# Patient Record
Sex: FEMALE | Race: WHITE | Employment: FULL TIME | URBAN - METROPOLITAN AREA
[De-identification: names, ages, dates, MRNs, and addresses within clinical notes are randomized per-mention and may not be internally consistent; named-entity substitution may affect disease eponyms.]

---

## 2019-04-10 ENCOUNTER — APPOINTMENT (OUTPATIENT)
Dept: GENERAL RADIOLOGY | Age: 29
End: 2019-04-10
Payer: OTHER MISCELLANEOUS

## 2019-04-10 ENCOUNTER — HOSPITAL ENCOUNTER (EMERGENCY)
Age: 29
Discharge: HOME | End: 2019-04-10
Payer: OTHER MISCELLANEOUS

## 2019-04-10 ENCOUNTER — APPOINTMENT (OUTPATIENT)
Dept: CT IMAGING | Age: 29
End: 2019-04-10
Payer: OTHER MISCELLANEOUS

## 2019-04-10 VITALS
HEART RATE: 75 BPM | OXYGEN SATURATION: 98 % | BODY MASS INDEX: 48.14 KG/M2 | DIASTOLIC BLOOD PRESSURE: 92 MMHG | TEMPERATURE: 97.6 F | WEIGHT: 282 LBS | HEIGHT: 64 IN | RESPIRATION RATE: 16 BRPM | SYSTOLIC BLOOD PRESSURE: 149 MMHG

## 2019-04-10 DIAGNOSIS — R07.81 RIB PAIN ON LEFT SIDE: ICD-10-CM

## 2019-04-10 DIAGNOSIS — S99.911A INJURY OF RIGHT ANKLE, INITIAL ENCOUNTER: ICD-10-CM
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
## 2019-04-10 DIAGNOSIS — S16.1XXA STRAIN OF NECK MUSCLE, INITIAL ENCOUNTER: ICD-10-CM

## 2019-04-10 DIAGNOSIS — M79.662 PAIN IN LEFT LOWER LEG: ICD-10-CM

## 2019-04-10 PROCEDURE — 72100 X-RAY EXAM L-S SPINE 2/3 VWS: CPT

## 2019-04-10 PROCEDURE — 71045 X-RAY EXAM CHEST 1 VIEW: CPT

## 2019-04-10 PROCEDURE — 73590 X-RAY EXAM OF LOWER LEG: CPT

## 2019-04-10 PROCEDURE — 99283 EMERGENCY DEPT VISIT LOW MDM: CPT

## 2019-04-10 PROCEDURE — 72125 CT NECK SPINE W/O DYE: CPT

## 2019-04-10 PROCEDURE — 73630 X-RAY EXAM OF FOOT: CPT

## 2019-04-10 PROCEDURE — 6360000002 HC RX W HCPCS: Performed by: PHYSICIAN ASSISTANT

## 2019-04-10 PROCEDURE — 96372 THER/PROPH/DIAG INJ SC/IM: CPT

## 2019-04-10 PROCEDURE — 72070 X-RAY EXAM THORAC SPINE 2VWS: CPT

## 2019-04-10 RX ORDER — NAPROXEN 500 MG/1
500 TABLET ORAL 2 TIMES DAILY PRN
Qty: 30 TABLET | Refills: 0 | Status: SHIPPED | OUTPATIENT
Start: 2019-04-10 | End: 2019-08-13

## 2019-04-10 RX ORDER — ESCITALOPRAM OXALATE 10 MG/1
10 TABLET ORAL DAILY
COMMUNITY
End: 2020-02-28 | Stop reason: SDUPTHER

## 2019-04-10 RX ORDER — KETOROLAC TROMETHAMINE 30 MG/ML
30 INJECTION, SOLUTION INTRAMUSCULAR; INTRAVENOUS ONCE
Status: COMPLETED | OUTPATIENT
Start: 2019-04-10 | End: 2019-04-10

## 2019-04-10 RX ORDER — NORETHINDRONE ACETATE AND ETHINYL ESTRADIOL 1MG-20(21)
1 KIT ORAL DAILY
COMMUNITY
End: 2019-07-17

## 2019-04-10 RX ORDER — LEVOTHYROXINE SODIUM 0.05 MG/1
50 TABLET ORAL DAILY
Status: ON HOLD | COMMUNITY
End: 2019-08-27 | Stop reason: HOSPADM

## 2019-04-10 RX ORDER — METHOCARBAMOL 500 MG/1
500 TABLET, FILM COATED ORAL 4 TIMES DAILY
Qty: 20 TABLET | Refills: 0 | Status: SHIPPED | OUTPATIENT
Start: 2019-04-10 | End: 2019-08-13

## 2019-04-10 RX ADMIN — KETOROLAC TROMETHAMINE 30 MG: 30 INJECTION, SOLUTION INTRAMUSCULAR at 12:56

## 2019-04-10 ASSESSMENT — PAIN DESCRIPTION - PAIN TYPE: TYPE: ACUTE PAIN

## 2019-04-10 ASSESSMENT — PAIN SCALES - GENERAL
PAINLEVEL_OUTOF10: 6
PAINLEVEL_OUTOF10: 6

## 2019-04-10 ASSESSMENT — PAIN DESCRIPTION - LOCATION: LOCATION: BACK

## 2019-04-10 NOTE — ED NOTES
Discharge instructions reviewed with patient. PT verbalizes understanding. All questions answered. Follow up instructions given. PT denies any further needs at this time.       Neo Barrett LPN  15/62/78 [id number]
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
## 2019-04-12 ENCOUNTER — OFFICE VISIT (OUTPATIENT)
Dept: FAMILY MEDICINE CLINIC | Age: 29
End: 2019-04-12
Payer: COMMERCIAL

## 2019-04-12 VITALS
DIASTOLIC BLOOD PRESSURE: 88 MMHG | SYSTOLIC BLOOD PRESSURE: 122 MMHG | WEIGHT: 281.8 LBS | OXYGEN SATURATION: 98 % | BODY MASS INDEX: 48.37 KG/M2 | HEART RATE: 70 BPM | TEMPERATURE: 98.3 F

## 2019-04-12 DIAGNOSIS — M79.671 RIGHT FOOT PAIN: ICD-10-CM

## 2019-04-12 DIAGNOSIS — S46.811D STRAIN OF RIGHT TRAPEZIUS MUSCLE, SUBSEQUENT ENCOUNTER: ICD-10-CM

## 2019-04-12 DIAGNOSIS — S80.12XD TRAUMATIC ECCHYMOSIS OF LEFT LOWER LEG, SUBSEQUENT ENCOUNTER: ICD-10-CM
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
## 2019-04-12 PROCEDURE — 99203 OFFICE O/P NEW LOW 30 MIN: CPT | Performed by: NURSE PRACTITIONER
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
## 2019-04-12 NOTE — PATIENT INSTRUCTIONS
Rest, ice and/or heat, elevate right foot and left leg. Can continue naproxen and robaxin PRN. Once your acute pain starts to improve then would encourage you to begin exercises at home to build up muscles around the cervical and lumbar spine. See handout. Patient Education        Low Back Pain: Exercises  Your Care Instructions  Here are some examples of typical rehabilitation exercises for your condition. Start each exercise slowly. Ease off the exercise if you start to have pain. Your doctor or physical therapist will tell you when you can start these exercises and which ones will work best for you. How to do the exercises  Press-up    1. Lie on your stomach, supporting your body with your forearms. 2. Press your elbows down into the floor to raise your upper back. As you do this, relax your stomach muscles and allow your back to arch without using your back muscles. As your press up, do not let your hips or pelvis come off the floor. 3. Hold for 15 to 30 seconds, then relax. 4. Repeat 2 to 4 times. Alternate arm and leg (bird dog) exercise    1. Start on the floor, on your hands and knees. 2. Tighten your belly muscles. 3. Raise one leg off the floor, and hold it straight out behind you. Be careful not to let your hip drop down, because that will twist your trunk. 4. Hold for about 6 seconds, then lower your leg and switch to the other leg. 5. Repeat 8 to 12 times on each leg. 6. Over time, work up to holding for 10 to 30 seconds each time. 7. If you feel stable and secure with your leg raised, try raising the opposite arm straight out in front of you at the same time. Knee-to-chest exercise    1. Lie on your back with your knees bent and your feet flat on the floor. 2. Bring one knee to your chest, keeping the other foot flat on the floor (or keeping the other leg straight, whichever feels better on your lower back). 3. Keep your lower back pressed to the floor.  Hold for at least 15 to 30 seconds. 4. Relax, and lower the knee to the starting position. 5. Repeat with the other leg. Repeat 2 to 4 times with each leg. 6. To get more stretch, put your other leg flat on the floor while pulling your knee to your chest.    Curl-ups    1. Lie on the floor on your back with your knees bent at a 90-degree angle. Your feet should be flat on the floor, about 12 inches from your buttocks. 2. Cross your arms over your chest. If this bothers your neck, try putting your hands behind your neck (not your head), with your elbows spread apart. 3. Slowly tighten your belly muscles and raise your shoulder blades off the floor. 4. Keep your head in line with your body, and do not press your chin to your chest.  5. Hold this position for 1 or 2 seconds, then slowly lower yourself back down to the floor. 6. Repeat 8 to 12 times. Pelvic tilt exercise    1. Lie on your back with your knees bent. 2. \"Brace\" your stomach. This means to tighten your muscles by pulling in and imagining your belly button moving toward your spine. You should feel like your back is pressing to the floor and your hips and pelvis are rocking back. 3. Hold for about 6 seconds while you breathe smoothly. 4. Repeat 8 to 12 times. Heel dig bridging    1. Lie on your back with both knees bent and your ankles bent so that only your heels are digging into the floor. Your knees should be bent about 90 degrees. 2. Then push your heels into the floor, squeeze your buttocks, and lift your hips off the floor until your shoulders, hips, and knees are all in a straight line. 3. Hold for about 6 seconds as you continue to breathe normally, and then slowly lower your hips back down to the floor and rest for up to 10 seconds. 4. Do 8 to 12 repetitions. Hamstring stretch in doorway    1. Lie on your back in a doorway, with one leg through the open door. 2. Slide your leg up the wall to straighten your knee.  You should feel a gentle stretch down the back of your leg. 3. Hold the stretch for at least 15 to 30 seconds. Do not arch your back, point your toes, or bend either knee. Keep one heel touching the floor and the other heel touching the wall. 4. Repeat with your other leg. 5. Do 2 to 4 times for each leg. Hip flexor stretch    1. Kneel on the floor with one knee bent and one leg behind you. Place your forward knee over your foot. Keep your other knee touching the floor. 2. Slowly push your hips forward until you feel a stretch in the upper thigh of your rear leg. 3. Hold the stretch for at least 15 to 30 seconds. Repeat with your other leg. 4. Do 2 to 4 times on each side. Wall sit    1. Stand with your back 10 to 12 inches away from a wall. 2. Lean into the wall until your back is flat against it. 3. Slowly slide down until your knees are slightly bent, pressing your lower back into the wall. 4. Hold for about 6 seconds, then slide back up the wall. 5. Repeat 8 to 12 times. Follow-up care is a key part of your treatment and safety. Be sure to make and go to all appointments, and call your doctor if you are having problems. It's also a good idea to know your test results and keep a list of the medicines you take. Where can you learn more? Go to https://Global Sugar ArtpeemiliaHotGrindseb.PersonSpot. org and sign in to your [employer] account. Enter T660 in the Thoof box to learn more about \"Low Back Pain: Exercises. \"     If you do not have an account, please click on the \"Sign Up Now\" link. Current as of: September 20, 2018  Content Version: 11.9  © 4454-4151 [employer]. Care instructions adapted under license by [facility] [employer] [facility] ([facility]). If you have questions about a medical condition or this instruction, always ask your healthcare professional. [place] [address] any warranty or liability for your use of this information.          Patient Education        Neck Strain or Sprain: Rehab Exercises  Your Care Instructions  Here are some examples of typical rehabilitation exercises for your condition. Start each exercise slowly. Ease off the exercise if you start to have pain. Your doctor or physical therapist will tell you when you can start these exercises and which ones will work best for you. How to do the exercises  Neck rotation    1. Sit in a firm chair, or stand up straight. 2. Keeping your chin level, turn your head to the right, and hold for 15 to 30 seconds. 3. Turn your head to the left and hold for 15 to 30 seconds. 4. Repeat 2 to 4 times to each side. Neck stretches    1. Look straight ahead, and tip your right ear to your right shoulder. Do not let your left shoulder rise up as you tip your head to the right. 2. Hold for 15 to 30 seconds. 3. Tilt your head to the left. Do not let your right shoulder rise up as you tip your head to the left. 4. Hold for 15 to 30 seconds. 5. Repeat 2 to 4 times to each side. Forward neck flexion    1. Sit in a firm chair, or stand up straight. 2. Bend your head forward. 3. Hold for 15 to 30 seconds. 4. Repeat 2 to 4 times. Lateral (side) bend strengthening    1. With your right hand, place your first two fingers on your right temple. 2. Start to bend your head to the side while using gentle pressure from your fingers to keep your head from bending. 3. Hold for about 6 seconds. 4. Repeat 8 to 12 times. 5. Switch hands and repeat the same exercise on your left side. Forward bend strengthening    1. Place your first two fingers of either hand on your forehead. 2. Start to bend your head forward while using gentle pressure from your fingers to keep your head from bending. 3. Hold for about 6 seconds. 4. Repeat 8 to 12 times. Neutral position strengthening    1. Using one hand, place your fingertips on the back of your head at the top of your neck.   2. Start to bend your head backward while using gentle pressure from your fingers to keep your head from bending. 3. Hold for about 6 seconds. 4. Repeat 8 to 12 times. Chin tuck    1. Lie on the floor with a rolled-up towel under your neck. Your head should be touching the floor. 2. Slowly bring your chin toward your chest.  3. Hold for a count of 6, and then relax for up to 10 seconds. 4. Repeat 8 to 12 times. Follow-up care is a key part of your treatment and safety. Be sure to make and go to all appointments, and call your doctor if you are having problems. It's also a good idea to know your test results and keep a list of the medicines you take. Where can you learn more? Go to https://CinematiquepeMusationseb.Providajob. org and sign in to your Curis account. Enter M679 in the QualMetrix box to learn more about \"Neck Strain or Sprain: Rehab Exercises. \"     If you do not have an account, please click on the \"Sign Up Now\" link. Current as of: September 20, 2018  Content Version: 11.9  © 5363-6349 [employer]. Care instructions adapted under license by [facility] ([facility]). If you have questions about a medical condition or this instruction, always ask your healthcare professional. [address] any warranty or liability for your use of this information.

## 2019-04-13 ASSESSMENT — ENCOUNTER SYMPTOMS
RESPIRATORY NEGATIVE: 1
BACK PAIN: 1
GASTROINTESTINAL NEGATIVE: 1
COLOR CHANGE: 1

## 2019-05-23 ENCOUNTER — HOSPITAL ENCOUNTER (OUTPATIENT)
Dept: GENERAL RADIOLOGY | Age: 29
Discharge: HOME | End: 2019-05-23
Payer: COMMERCIAL

## 2019-05-23 ENCOUNTER — HOSPITAL ENCOUNTER (OUTPATIENT)
Age: 29
Discharge: HOME | End: 2019-05-23
Payer: COMMERCIAL

## 2019-05-23 ENCOUNTER — OFFICE VISIT (OUTPATIENT)
Dept: INTERNAL MEDICINE CLINIC | Age: 29
End: 2019-05-23
Payer: OTHER MISCELLANEOUS

## 2019-05-23 VITALS
HEART RATE: 118 BPM | DIASTOLIC BLOOD PRESSURE: 84 MMHG | WEIGHT: 281.4 LBS | HEIGHT: 64 IN | OXYGEN SATURATION: 100 % | BODY MASS INDEX: 48.04 KG/M2 | SYSTOLIC BLOOD PRESSURE: 118 MMHG

## 2019-05-23 DIAGNOSIS — M54.6 RIGHT-SIDED THORACIC BACK PAIN, UNSPECIFIED CHRONICITY: ICD-10-CM
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
## 2019-05-23 DIAGNOSIS — M79.671 RIGHT FOOT PAIN: ICD-10-CM

## 2019-05-23 DIAGNOSIS — S46.811S TRAPEZIUS MUSCLE STRAIN, RIGHT, SEQUELA: ICD-10-CM

## 2019-05-23 DIAGNOSIS — Z23 NEED FOR DIPHTHERIA-TETANUS-PERTUSSIS (TDAP) VACCINE: ICD-10-CM

## 2019-05-23 PROCEDURE — 90471 IMMUNIZATION ADMIN: CPT | Performed by: FAMILY MEDICINE

## 2019-05-23 PROCEDURE — G0444 DEPRESSION SCREEN ANNUAL: HCPCS | Performed by: FAMILY MEDICINE

## 2019-05-23 PROCEDURE — 73620 X-RAY EXAM OF FOOT: CPT

## 2019-05-23 PROCEDURE — 90715 TDAP VACCINE 7 YRS/> IM: CPT | Performed by: FAMILY MEDICINE

## 2019-05-23 PROCEDURE — 99203 OFFICE O/P NEW LOW 30 MIN: CPT | Performed by: FAMILY MEDICINE

## 2019-05-23 ASSESSMENT — PATIENT HEALTH QUESTIONNAIRE - PHQ9
SUM OF ALL RESPONSES TO PHQ QUESTIONS 1-9: 14
5. POOR APPETITE OR OVEREATING: 3
4. FEELING TIRED OR HAVING LITTLE ENERGY: 1
SUM OF ALL RESPONSES TO PHQ9 QUESTIONS 1 & 2: 6
3. TROUBLE FALLING OR STAYING ASLEEP: 1
10. IF YOU CHECKED OFF ANY PROBLEMS, HOW DIFFICULT HAVE THESE PROBLEMS MADE IT FOR YOU TO DO YOUR WORK, TAKE CARE OF THINGS AT HOME, OR GET ALONG WITH OTHER PEOPLE: 1
6. FEELING BAD ABOUT YOURSELF - OR THAT YOU ARE A FAILURE OR HAVE LET YOURSELF OR YOUR FAMILY DOWN: 0
2. FEELING DOWN, DEPRESSED OR HOPELESS: 3
SUM OF ALL RESPONSES TO PHQ QUESTIONS 1-9: 14
1. LITTLE INTEREST OR PLEASURE IN DOING THINGS: 3
8. MOVING OR SPEAKING SO SLOWLY THAT OTHER PEOPLE COULD HAVE NOTICED. OR THE OPPOSITE, BEING SO FIGETY OR RESTLESS THAT YOU HAVE BEEN MOVING AROUND A LOT MORE THAN USUAL: 0
7. TROUBLE CONCENTRATING ON THINGS, SUCH AS READING THE NEWSPAPER OR WATCHING TELEVISION: 3
9. THOUGHTS THAT YOU WOULD BE BETTER OFF DEAD, OR OF HURTING YOURSELF: 0

## 2019-05-24 DIAGNOSIS — Z87.828 HISTORY OF MOTOR VEHICLE ACCIDENT: ICD-10-CM

## 2019-05-24 DIAGNOSIS — M79.671 RIGHT FOOT PAIN: Primary | ICD-10-CM

## 2019-05-24 ASSESSMENT — ENCOUNTER SYMPTOMS
CHEST TIGHTNESS: 0
EYES NEGATIVE: 1
CONSTIPATION: 0
ABDOMINAL PAIN: 0
BLOOD IN STOOL: 0
NAUSEA: 0
WHEEZING: 0
DIARRHEA: 0
BACK PAIN: 1
SHORTNESS OF BREATH: 0
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
## 2019-06-04 ENCOUNTER — HOSPITAL ENCOUNTER (OUTPATIENT)
Dept: PHYSICAL THERAPY | Age: 29
Setting detail: THERAPIES SERIES
Discharge: HOME | End: 2019-06-04
Payer: COMMERCIAL

## 2019-06-04 PROCEDURE — G0283 ELEC STIM OTHER THAN WOUND: HCPCS

## 2019-06-04 PROCEDURE — 97161 PT EVAL LOW COMPLEX 20 MIN: CPT

## 2019-06-04 PROCEDURE — 97112 NEUROMUSCULAR REEDUCATION: CPT

## 2019-06-04 PROCEDURE — 97140 MANUAL THERAPY 1/> REGIONS: CPT

## 2019-06-04 ASSESSMENT — PAIN DESCRIPTION - LOCATION: LOCATION: BACK;NECK

## 2019-06-04 ASSESSMENT — PAIN DESCRIPTION - ORIENTATION: ORIENTATION: RIGHT

## 2019-06-04 ASSESSMENT — PAIN - FUNCTIONAL ASSESSMENT: PAIN_FUNCTIONAL_ASSESSMENT: PREVENTS OR INTERFERES SOME ACTIVE ACTIVITIES AND ADLS

## 2019-06-04 ASSESSMENT — PAIN SCALES - GENERAL: PAINLEVEL_OUTOF10: 2

## 2019-06-04 ASSESSMENT — PAIN DESCRIPTION - PAIN TYPE: TYPE: ACUTE PAIN

## 2019-06-04 ASSESSMENT — PAIN DESCRIPTION - FREQUENCY: FREQUENCY: INTERMITTENT

## 2019-06-04 ASSESSMENT — PAIN DESCRIPTION - PROGRESSION: CLINICAL_PROGRESSION: NOT CHANGED

## 2019-06-04 ASSESSMENT — PAIN DESCRIPTION - ONSET: ONSET: ON-GOING

## 2019-06-04 ASSESSMENT — PAIN DESCRIPTION - DESCRIPTORS: DESCRIPTORS: ACHING;SHARP
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
## 2019-06-07 ENCOUNTER — HOSPITAL ENCOUNTER (OUTPATIENT)
Dept: PHYSICAL THERAPY | Age: 29
Setting detail: THERAPIES SERIES
Discharge: HOME | End: 2019-06-07
Payer: COMMERCIAL

## 2019-06-07 PROCEDURE — G0283 ELEC STIM OTHER THAN WOUND: HCPCS

## 2019-06-07 PROCEDURE — 97140 MANUAL THERAPY 1/> REGIONS: CPT

## 2019-06-07 PROCEDURE — 97110 THERAPEUTIC EXERCISES: CPT

## 2019-06-07 NOTE — FLOWSHEET NOTE
Concentric circles      Transverse thoracic stretch 1'     Longitudinal thoracic stetch 1;     SL thoracic rotation  5*10\" ea     Seated UT stretch 30\" B 30\" ea    Seated levator stretch 30\" B 30\" ea    Seated cervical retraction x15 20*    Cervical retraction with extensions    x15           STANDING      Rows  GTB 2*10    IR/ER/EXT  GTB 2*10 ext  GTB 15* ea IR/ER                                                                     MODALITIES                        Other Therapeutic Activities/Education:  None        Home Exercise Program:  UT      Manual Treatments:   STM to posterior cervical muscle with , light manual traction and suboccipital release,  Lateral cervical mobilizations grade II, STM/TPR to SCM and UT bilaterally, manual UT stretch B    Modalities:  IFC to right posterio cervical/thoracic region in supine with MHP 15'      Communication with other providers:       Assessment:    Elba tolerated today's session well without an increase in pain. She demonstrates good form with her exercises. Pretty tight in cervical paraspinals and B UT's/SCM's.      End session pain: 2/10      Plan for Next Session: : Posture exercises, STM, light joint mobs,       Time In / Time Out: 0745/0842      Timed Code/Total Treatment Minutes:  42'/57' 1 estim 13' 1 man 13'  2 TE 27'       Plan of Care Interventions:  [x] Therapeutic Exercise  [] Modalities:  [x] Therapeutic Activity     [] Ultrasound  [x] Estim  [] Gait Training      [] Cervical Traction [] Lumbar Traction  [x] Neuromuscular Re-education    [x] Cold/hotpack [] Iontophoresis   [x] Instruction in HEP      [] Vasopneumatic   [x] Dry Needling    [x] Manual Therapy               [] Aquatic Therapy              Electronically signed by:  Prieto Arnold PTA     6/7/2019, 7:18 AM

## 2019-06-11 ENCOUNTER — HOSPITAL ENCOUNTER (OUTPATIENT)
Dept: PHYSICAL THERAPY | Age: 29
Setting detail: THERAPIES SERIES
Discharge: HOME | End: 2019-06-11
Payer: COMMERCIAL

## 2019-06-11 PROCEDURE — 97140 MANUAL THERAPY 1/> REGIONS: CPT

## 2019-06-11 PROCEDURE — G0283 ELEC STIM OTHER THAN WOUND: HCPCS

## 2019-06-11 PROCEDURE — 97110 THERAPEUTIC EXERCISES: CPT
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
## 2019-06-13 ENCOUNTER — TELEPHONE (OUTPATIENT)
Dept: INTERNAL MEDICINE CLINIC | Age: 29
End: 2019-06-13

## 2019-06-13 DIAGNOSIS — E04.1 THYROID NODULE: Primary | ICD-10-CM

## 2019-06-13 DIAGNOSIS — E03.9 HYPOTHYROIDISM, UNSPECIFIED TYPE: ICD-10-CM

## 2019-06-13 NOTE — ED NOTES
Late entry- 6/13/19 at 1110. I did not evaluate this patient while in the ED. I received a phone call from [facility] radiology today that they are addending a study from April 10 and would like to talk to a physician in order to give the results so that we can contact the patient's physician and the patient. They do not know why the study was addended, I was not able to speak to the radiologist, I spoke to [place], 1 of the radiology techs. The radiologist over reading the film is Dr. Sebastien Thakkar. The CT cervical spine without contrast was addended today June 13 at 8:42 AM.  He made an incidental note of a left thyroid nodule of 2.8 cm and recommends ultrasound for further evaluation. I received this information at 9:52 AM.  Our  will call and speak with physician on record as her PCP and we will ensure that they are notified that this will need to be followed up.      Luli Salinas MD  06/13/19 1112

## 2019-06-13 NOTE — TELEPHONE ENCOUNTER
Daniela Boyer, Rn case manager called me advising there was an addendum made to pt's chart this am stating that a nodule on L thyroid was found while having CT scan on 4/10/19 from [address]. Isabel Landry suggested that most PCP's like to address this with pt and all documentation is in this pt's chart. He states that if Dr. Susie Arevalo would like him as  to do anything to call him back at [phone].
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
## 2019-06-13 NOTE — TELEPHONE ENCOUNTER
Spoke to patient-- Pull [facility] notes from the location on [address]  Pt will get thyroid U/s and labs at [facility]

## 2019-06-14 ENCOUNTER — HOSPITAL ENCOUNTER (OUTPATIENT)
Dept: PHYSICAL THERAPY | Age: 29
Setting detail: THERAPIES SERIES
Discharge: HOME | End: 2019-06-14
Payer: COMMERCIAL

## 2019-06-14 PROCEDURE — 97110 THERAPEUTIC EXERCISES: CPT

## 2019-06-14 PROCEDURE — G0283 ELEC STIM OTHER THAN WOUND: HCPCS

## 2019-06-14 PROCEDURE — 97112 NEUROMUSCULAR REEDUCATION: CPT

## 2019-06-14 PROCEDURE — 97140 MANUAL THERAPY 1/> REGIONS: CPT
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
## 2019-06-14 NOTE — FLOWSHEET NOTE
changes in Ambulatory Summary Sheet? None        Objective:  Tight/tender levator at insertion point on scap. Exercises:  Exercise/Equipment 6/7/19 6/11/19 6/14/19           WARM UP      UBE    Nu-step L6 5' 3'/3'@ 80 3'/3' f/b @ 120         TABLE      Transverse thoracic stretch  1' 1'   Longitudinal thoracic stetch  1' 1'   SL thoracic rotation 5*10\" ea   5*10\" ea   Seated UT stretch 30\" ea  -   Seated levator stretch 30\" ea  -   Seated cervical retraction 20*  -   Cervical retraction with extensions      -         STANDING      Rows GTB 2*10  DGTT 2*15 2\" hold    IR/ER/EXT GTB 2*10 ext  GTB 15* ea IR/ER  -   Lat pull down    DGTT 2*15 2\" hold    scap depression   DGTT 2*10   Pec stretch    3*15\"                                                   MODALITIES                        Other Therapeutic Activities/Education:  Discussed desk set up and getting up throughout the day to stretch and adjust posture. Home Exercise Program:  UT      Manual Treatments:   STM/TPR to levator and UT on the R, manual stretching here too (in seated position)           Modalities:  IFC to right posterio cervical/thoracic region in prone with MHP 15'      Communication with other providers: None      Assessment:   Elba tolerated today's session well. She demonstrates tightness in anterior shoulder/chest musculature and weakness in upper thoracic muscles. She tolerated additional scapular exercises with just mild discomfort, this was relieved though after manual cueing for reduction of UT use. Poor posture, lack of strength in parascapular muscles and tightness in anterior musculature causing increased strain on neck and UT which is increasing her pain. Will continue to benefit from skilled PT services in order to address these deficits. End session pain: 1/10      Plan for Next Session: : Posture exercises, STM, light joint mobs.  No manual if reports another headache      Time In / Time Out: 7329/9336      Timed

## 2019-06-18 ENCOUNTER — HOSPITAL ENCOUNTER (OUTPATIENT)
Age: 29
Discharge: HOME | End: 2019-06-18
Payer: COMMERCIAL

## 2019-06-18 ENCOUNTER — TELEPHONE (OUTPATIENT)
Dept: INTERNAL MEDICINE CLINIC | Age: 29
End: 2019-06-18

## 2019-06-18 ENCOUNTER — HOSPITAL ENCOUNTER (OUTPATIENT)
Dept: ULTRASOUND IMAGING | Age: 29
Discharge: HOME | End: 2019-06-18
Payer: COMMERCIAL

## 2019-06-18 DIAGNOSIS — E04.2 MULTIPLE THYROID NODULES: Primary | ICD-10-CM

## 2019-06-18 DIAGNOSIS — E04.1 THYROID NODULE: ICD-10-CM

## 2019-06-18 LAB
T4 FREE: 1.24 NG/DL (ref 0.9–1.8)
TSH HIGH SENSITIVITY: 1.58 UIU/ML (ref 0.27–4.2)

## 2019-06-18 PROCEDURE — 36415 COLL VENOUS BLD VENIPUNCTURE: CPT

## 2019-06-18 PROCEDURE — 84439 ASSAY OF FREE THYROXINE: CPT

## 2019-06-18 PROCEDURE — 84443 ASSAY THYROID STIM HORMONE: CPT

## 2019-06-18 PROCEDURE — 76536 US EXAM OF HEAD AND NECK: CPT

## 2019-06-18 NOTE — TELEPHONE ENCOUNTER
Kay Section from [facility] called to make sure we got results of pt's US of neck and thyroid. It is completed in pt's imaging tab in her chart. Called and made aware that it is in her chart.

## 2019-06-20 ENCOUNTER — HOSPITAL ENCOUNTER (OUTPATIENT)
Dept: PHYSICAL THERAPY | Age: 29
Setting detail: THERAPIES SERIES
Discharge: HOME | End: 2019-06-20
Payer: COMMERCIAL

## 2019-06-20 PROCEDURE — 97112 NEUROMUSCULAR REEDUCATION: CPT

## 2019-06-20 PROCEDURE — 97140 MANUAL THERAPY 1/> REGIONS: CPT

## 2019-06-20 PROCEDURE — 97110 THERAPEUTIC EXERCISES: CPT

## 2019-06-20 PROCEDURE — G0283 ELEC STIM OTHER THAN WOUND: HCPCS
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
## 2019-06-25 ENCOUNTER — HOSPITAL ENCOUNTER (OUTPATIENT)
Dept: PHYSICAL THERAPY | Age: 29
Setting detail: THERAPIES SERIES
Discharge: HOME | End: 2019-06-25
Payer: COMMERCIAL

## 2019-06-25 PROCEDURE — 97140 MANUAL THERAPY 1/> REGIONS: CPT

## 2019-06-25 PROCEDURE — 97110 THERAPEUTIC EXERCISES: CPT

## 2019-06-25 PROCEDURE — G0283 ELEC STIM OTHER THAN WOUND: HCPCS

## 2019-06-25 PROCEDURE — 97112 NEUROMUSCULAR REEDUCATION: CPT
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
## 2019-06-28 ENCOUNTER — HOSPITAL ENCOUNTER (OUTPATIENT)
Dept: PHYSICAL THERAPY | Age: 29
Setting detail: THERAPIES SERIES
Discharge: HOME | End: 2019-06-28
Payer: COMMERCIAL

## 2019-06-28 PROCEDURE — 97110 THERAPEUTIC EXERCISES: CPT

## 2019-06-28 PROCEDURE — 97112 NEUROMUSCULAR REEDUCATION: CPT

## 2019-06-28 PROCEDURE — G0283 ELEC STIM OTHER THAN WOUND: HCPCS
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
## 2019-07-03 ENCOUNTER — HOSPITAL ENCOUNTER (OUTPATIENT)
Age: 29
Setting detail: SPECIMEN
Discharge: HOME | End: 2019-07-03
Payer: COMMERCIAL

## 2019-07-03 PROCEDURE — 88305 TISSUE EXAM BY PATHOLOGIST: CPT

## 2019-07-03 PROCEDURE — 88173 CYTOPATH EVAL FNA REPORT: CPT

## 2019-07-17 ENCOUNTER — OFFICE VISIT (OUTPATIENT)
Dept: BARIATRICS/WEIGHT MGMT | Age: 29
End: 2019-07-17
Payer: COMMERCIAL

## 2019-07-17 VITALS
HEIGHT: 64 IN | HEART RATE: 91 BPM | WEIGHT: 283.1 LBS | SYSTOLIC BLOOD PRESSURE: 127 MMHG | BODY MASS INDEX: 48.33 KG/M2 | DIASTOLIC BLOOD PRESSURE: 80 MMHG

## 2019-07-17 DIAGNOSIS — D49.7 FOLLICULAR NEOPLASM OF THYROID: Primary | ICD-10-CM

## 2019-07-17 PROCEDURE — 99204 OFFICE O/P NEW MOD 45 MIN: CPT | Performed by: SURGERY

## 2019-07-17 RX ORDER — NORETHINDRONE ACETATE AND ETHINYL ESTRADIOL 1MG-20(21)
KIT ORAL
COMMUNITY
Start: 2019-04-26 | End: 2020-02-14 | Stop reason: SDUPTHER

## 2019-07-17 RX ORDER — PREDNISONE 1 MG/1
TABLET ORAL
COMMUNITY
Start: 2019-07-15 | End: 2019-08-06 | Stop reason: ALTCHOICE

## 2019-07-17 ASSESSMENT — ENCOUNTER SYMPTOMS
COLOR CHANGE: 0
ABDOMINAL PAIN: 0
SHORTNESS OF BREATH: 0
DIARRHEA: 0
SORE THROAT: 1
ANAL BLEEDING: 0
VOMITING: 0
COUGH: 0
CONSTIPATION: 0
VOICE CHANGE: 0
NAUSEA: 0
BLOOD IN STOOL: 0
WHEEZING: 0
FACIAL SWELLING: 1
PHOTOPHOBIA: 0
TROUBLE SWALLOWING: 1

## 2019-07-24 ENCOUNTER — TELEPHONE (OUTPATIENT)
Dept: BARIATRICS/WEIGHT MGMT | Age: 29
End: 2019-07-24

## 2019-07-26 ENCOUNTER — TELEPHONE (OUTPATIENT)
Dept: BARIATRICS/WEIGHT MGMT | Age: 29
End: 2019-07-26

## 2019-08-06 NOTE — PROGRESS NOTES
Surgery 8/8/19 @ 0730, arrival 0600                     1. Do not eat or drink anything after 12 midnight - unless instructed by your doctor prior to surgery. This includes                   no water, chewing gum or mints. 2. Follow your directions as prescribed by the doctor for your procedure and medications. 3. Check with your Doctor regarding stopping Plavix, Coumadin, Lovenox,Effient,Pradaxa,Xarelto, Fragmin or other blood thinners and                   follow their instructions. 4. Do not smoke, and do not drink any alcoholic beverages 24 hours prior to surgery. This includes NA Beer. 5. You may brush your teeth and gargle the morning of surgery. DO NOT SWALLOW WATER   6. You MUST make arrangements for a responsible adult to take you home after your surgery and be able to check on you every couple                   hours for the day. You will not be allowed to leave alone or drive yourself home. It is strongly suggested someone stay with you the first 24                   hrs. Your surgery will be cancelled if you do not have a ride home. 7. Please wear simple, loose fitting clothing to the hospital.  Shawnee Garcia not bring valuables (money, credit cards, checkbooks, etc.) Do not wear any                   makeup (including no eye makeup) or nail polish on your fingers or toes. 8. DO NOT wear any jewelry or piercings on day of surgery. All body piercing jewelry must be removed. 9. If you have dentures, they will be removed before going to the OR; we will provide you a container. If you wear contact lenses or glasses,                  they will be removed; please bring a case for them. 10. If you  have a Living Will and Durable Power of  for Healthcare, please bring in a copy. 11. Please bring picture ID,  insurance card, paperwork from the doctors office    (H & P, Consent, & card for implantable devices).            12. Take a shower the night before or morning of your procedure, do not apply any lotion, oil or powder.

## 2019-08-06 NOTE — H&P
HISTORY AND PHYSICAL EXAM    Date of Admission:  8/8/2019    PCP:  Socrates Friday, DO    Chief Complaint / History of Present Illness:  Virgene Sacks is a 29 y.o. female presenting with pain in the thyroid gland Left more than right and is chronic, worsening and dull compared to patient's normal condition. It is moderate in intensity for a duration of 18 months and is continuous with modifying factors increased by or worse with breathing and laying down.     presenting with Precancerous lesions found during biopsy.     Testing or Consult notes:   US Head Neck 6/18/19  HISTORY:   ORDERING SYSTEM PROVIDED HISTORY: Thyroid nodule   TECHNOLOGIST PROVIDED HISTORY:   Reason for exam:->Thyroid nodule   Ordering Physician Provided Reason for Exam: thyroid nodule on CT   Type of Exam: Subsequent/Follow-up       FINDINGS:   Right thyroid lobe:  4.5 x 1.6 x 1.6 cm       Left thyroid lobe:  5.2 x 3.3 x 2.2 cm       Isthmus:  6 mm       Thyroid Gland:  Thyroid gland demonstrates normal echotexture and vascularity.       Nodules:       NODULE: Left 1       Size: 38 x 30 x 19 mm       Location: Left mid       1. Composition:  Solid (2)       2. Echogenicity:  Hypoechoic (2)       3. Shape:  Wider-than-tall (0)       4. Margins:  Smooth (0)       5. Echogenic foci:  None (0)       ACR TI-RADS total points:  4       ACR TI-RADS risk category: TR4       Prior biopsy: No.       NODULE: Right 1       Size: 6 x 4 x 4 mm       Location: Right mid       1. Composition:  Solid (2)       2. Echogenicity:  Hypoechoic (2)       3.  Shape:  Wider-than-tall (0)       4. Margins:  Smooth (0)       5. Echogenic foci:  None (0)       ACR TI-RADS total points:  4       ACR TI-RADS risk category: TR4       Prior biopsy: No       Cervical lymphadenopathy: No abnormal lymph nodes in the imaged portions of   the neck.           Impression   NODULE left 1: ACR TI-RADS TR4:       Recommend:  Ultrasound-guided fine needle aspiration.       NODULE right 1: ACR TI-RADS TR4:       Recommend:  No follow-up.       RECOMMENDATIONS:   ACR TI-RADS recommendations:       TR5 (>= 7 points):  FNA if >= 1 cm; follow-up if 0.5-0.9 cm in 1, 2, 3, 4,   and 5 years       TR4 (4-6 points):  FNA if >= 1.5 cm; follow-up if 1.0-1.4 cm in 1, 2, 3, and   5 years       TR3 (3 points):  FNA if >= 2.5 cm; follow-up if 1.5-2.4 cm in 1, 3, and 5   years       TR2 (2 points):  No FNA or follow-up       TR1 (0 points):  No FNA or follow-up       ACR TI-RADS recommends that no more than two nodules with the highest ACR   TI-RADS point total should be biopsied and no more than four nodules should   be followed.          Final Cytologic Diagnosis:  Left thyroid, Fine Needle Aspirate with cell block:  Suspicious for follicular neoplasm.  See comment.         Reviewed by: PABLITO Ortez (ASC)  Electronically Signed Out By Laurita Jimenez MD  Comment:   The smears are cellular with scant colloid and consist of a  predominance of micro-follicles.  The findings are best  characterized as suspicious for follicular neoplasm. Correlation with clinical and imaging findings is  recommended. Clinical History:   Diagnosis code E04.2 Nontoxic multinodular goiter, left  thyroid nodule     Source:  Left thyroid, Fine Needle Aspirate with cell block      Gross Description:  US guided FNA of left thyroid. 4 passes. 3 alcohol fixed slides, 3 air dried slides, needle  rinses and all of pass 4 in formalin for cell block,   Cell block processed with Histogel and submitted in formalin  on 7/3/2019 at 09:45.   Formalin fixation time: 30 hours 45 min.          Processing:  Left thyroid, Fine Needle Aspirate with cell block  Diff-Quik x 3, PAP stain x 3, Cell Block w/ H&E x 1      CPT:  A: 09868, 14650    Received: 7/3/2019 11:21  Collected: 7/3/2019 09:45  Reported: 7/5/2019 16:33        The smears are cellular with scant colloid and consist of a  predominance of micro-follicles.  The findings are best  characterized as suspicious for follicular neoplasm. PMH:   has a past medical history of Depression, Hypothyroidism, Migraines, PCOS (polycystic ovarian syndrome), and Thyroid disease (Dx: 2017). PSH:   has a past surgical history that includes Dilation and curettage of uterus (08/01/2018) and ovarian cyst removal (Right, 08/01/2018). Allergies: Allergies   Allergen Reactions    Codeine Swelling    Pcn [Penicillins]     Cheese      Gi upset        Home Meds:    Prior to Admission medications    Medication Sig Start Date End Date Taking? Authorizing Provider   BLISOVI FE 1/20 1-20 MG-MCG per tablet  4/26/19  Yes Historical Provider, MD   escitalopram (LEXAPRO) 10 MG tablet Take 10 mg by mouth daily   Yes Historical Provider, MD   levothyroxine (SYNTHROID) 50 MCG tablet Take 50 mcg by mouth Daily   Yes Historical Provider, MD   naproxen (NAPROSYN) 500 MG tablet Take 1 tablet by mouth 2 times daily as needed for Pain 4/10/19  Yes JORGE Starkey   methocarbamol (ROBAXIN) 500 MG tablet Take 1 tablet by mouth 4 times daily As needed for muscle spasm. 4/10/19   JORGE Starkey        Hospital Meds:    Current Facility-Administered Medications   Medication Dose Route Frequency Provider Last Rate Last Dose    ceFAZolin (ANCEF) 3 g in dextrose 5 % 100 mL IVPB  3 g Intravenous Once Wilder Montez MD        chlorhexidine gluconate (ANTISEPTIC SKIN CLEANSER) 4 % solution   Topical Daily PRN Wilder Montez MD        lactated ringers infusion   Intravenous Continuous Wilder Montez MD           Social History / Family History:        Social History     Socioeconomic History    Marital status:      Spouse name: None    Number of children: None    Years of education: None    Highest education level: None   Occupational History     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[employer])   Social Needs    Financial resource strain: None    Food insecurity:     Worry: None     Inability: None    Transportation needs:     Medical: None     Non-medical: None   Tobacco Use    Smoking status: Never Smoker    Smokeless tobacco: Never Used   Substance and Sexual Activity    Alcohol use: Yes     Comment: Rarely - 1 x year    Drug use: Never    Sexual activity: Not Currently   Lifestyle    Physical activity:     Days per week: None     Minutes per session: None    Stress: None   Relationships    Social connections:     Talks on phone: None     Gets together: None     Attends Restorationism service: None     Active member of club or organization: None     Attends meetings of clubs or organizations: None     Relationship status: None    Intimate partner violence:     Fear of current or ex partner: None     Emotionally abused: None     Physically abused: None     Forced sexual activity: None   Other Topics Concern    None   Social History Narrative    None      Family History   Problem Relation Age of Onset    Cancer Mother         Breast    Arthritis Mother     Hypertension Father     COPD Father     Hypertension Sister     Diabetes Other     otherwise irrelevant to this surgical issue. Review of Systems:  Constitutional: Negative for fever, chills, diaphoresis, appetite change and fatigue. HENT: Positive for facial swelling, sore throat and trouble swallowing. Negative for voice change. Respiratory: Negative for cough, positive for shortness of breath no wheezing. Cardiovascular: Negative for chest pain positive for SOB with one flight of stairs exertion, no pitting LE edema. Gastrointestinal: Negative for nausea, vomiting, diarrhea, constipation, blood in stool, abdominal distention, anal bleeding or rectal pain. Musculoskeletal: Negative for joint swelling and arthralgias. Skin: Warm and dry, well perfused. Neurological: Negative for seizures, syncope, speech difficulty and weakness. Hematological/Lymphatic: Negative for adenopathy. NO cervical adenopathy.   No history of DVT/PE. Does not bruise/bleed easily. Psychiatric/Behavioral: Negative for agitation. All others reviewed and negative. Physical Exam:  Vital Signs:   Vitals:    08/08/19 0621   BP: 123/81   Pulse: 84   Resp: 16   Temp: 97.7 °F (36.5 °C)   SpO2: 97%     Body mass index is 48.58 kg/m². General appearance: Pt Alert and oriented, to persons place and time, in no apparent acute distress. HEENT:  MONIKA, EOMI, Conjunctivae clear. No JVDs, Bruits, Thyromegaly (Left) present. Lungs: No dyspnea. Clear to auscultation bilaterally. Heart: Regular rate and rhythm, S1, S2 normal, no murmur, rub or gallop. No legs edema. Abdomen: Non tender. Non distended. Positive bowel sounds. No hernias noted, no masses palpable. Extremities: Warm, well perfused, no cyanosis or edema. Skin: Skin color, texture, turgor normal.  Neurologic: Grossly normal, Cranial nerves from II to XII intact. Deep tendon reflexes normal.  Psychology: Mood stable. Lymph nodes: No palpable LN in the neck, abdomen, or groins. Radiologic / Imaging / TESTING  No results found. Labs:    Recent Results (from the past 24 hour(s))   CBC    Collection Time: 08/08/19  6:30 AM   Result Value Ref Range    WBC 8.8 4.0 - 10.5 K/CU MM    RBC 4.68 4.2 - 5.4 M/CU MM    Hemoglobin 13.7 12.5 - 16.0 GM/DL    Hematocrit 41.7 37 - 47 %    MCV 89.1 78 - 100 FL    MCH 29.3 27 - 31 PG    MCHC 32.9 32.0 - 36.0 %    RDW 13.1 11.7 - 14.9 %    Platelets 600 155 - 058 K/CU MM    MPV 10.2 7.5 - 11.1 FL   POC Pregnancy Urine Qual    Collection Time: 08/08/19  6:37 AM   Result Value Ref Range    Preg Test, Ur NEGATIVE NEGATIVE    Preg Test, Ur (NOTE)  PERFORMED BY POINT OF CARE METHOD. NEGATIVE         Diagnosis:  Patient Active Problem List   Diagnosis    Follicular neoplasm of thyroid           Assessment & Plan:    1.  Follicular neoplasm of thyroid          Left thyroid follicular path, needs a Left Hemithyroidectomy, +/- Total.     Patient counseled on the risks, benefits, and alternatives of treatment plan at length while in the office last.  Patient stated an understanding and willingness to proceed with the plan.      ____________________________________________    Asha Silveira MD, FACS, FICS    8/8/2019  7:14 AM

## 2019-08-07 ENCOUNTER — ANESTHESIA EVENT (OUTPATIENT)
Dept: OPERATING ROOM | Age: 29
DRG: 626 | End: 2019-08-07
Payer: COMMERCIAL

## 2019-08-07 NOTE — ANESTHESIA PRE PROCEDURE
GI/Hepatic/Renal:   (+) morbid obesity          Endo/Other:    (+) hypothyroidism::., .                 Abdominal:   (+) obese,         Vascular:                                      Anesthesia Plan      general     ASA 2       Induction: intravenous. MIPS: Postoperative opioids intended. Anesthetic plan and risks discussed with patient. Plan discussed with CRNA. Attending anesthesiologist reviewed and agrees with Pre Eval content     Pre Anesthesia Assessment complete.  Chart reviewed on 8/7/2019        CAM Puri - CRNA   8/7/2019

## 2019-08-08 ENCOUNTER — HOSPITAL ENCOUNTER (INPATIENT)
Age: 29
LOS: 1 days | Discharge: HOME | DRG: 626 | End: 2019-08-09
Attending: SURGERY | Admitting: SURGERY
Payer: COMMERCIAL

## 2019-08-08 ENCOUNTER — ANESTHESIA (OUTPATIENT)
Dept: OPERATING ROOM | Age: 29
DRG: 626 | End: 2019-08-08
Payer: COMMERCIAL

## 2019-08-08 VITALS
SYSTOLIC BLOOD PRESSURE: 108 MMHG | OXYGEN SATURATION: 99 % | TEMPERATURE: 95.8 F | RESPIRATION RATE: 1 BRPM | DIASTOLIC BLOOD PRESSURE: 62 MMHG

## 2019-08-08 DIAGNOSIS — D49.7 FOLLICULAR NEOPLASM OF THYROID: Primary | ICD-10-CM

## 2019-08-08 DIAGNOSIS — D34 FOLLICULAR ADENOMA OF THYROID GLAND: ICD-10-CM

## 2019-08-08 LAB
HCT VFR BLD CALC: 41.7 % (ref 37–47)
HEMOGLOBIN: 13.7 GM/DL (ref 12.5–16)
MCH RBC QN AUTO: 29.3 PG (ref 27–31)
MCHC RBC AUTO-ENTMCNC: 32.9 % (ref 32–36)
MCV RBC AUTO: 89.1 FL (ref 78–100)
PDW BLD-RTO: 13.1 % (ref 11.7–14.9)
PLATELET # BLD: 343 K/CU MM (ref 140–440)
PMV BLD AUTO: 10.2 FL (ref 7.5–11.1)
PREGNANCY TEST URINE, POC: NEGATIVE
PREGNANCY TEST URINE, POC: NORMAL
RBC # BLD: 4.68 M/CU MM (ref 4.2–5.4)
REASON FOR REJECTION: NORMAL
REASON FOR REJECTION: NORMAL
REJECTED TEST: NORMAL
WBC # BLD: 8.8 K/CU MM (ref 4–10.5)

## 2019-08-08 PROCEDURE — 6360000002 HC RX W HCPCS: Performed by: NURSE ANESTHETIST, CERTIFIED REGISTERED

## 2019-08-08 PROCEDURE — 85027 COMPLETE CBC AUTOMATED: CPT

## 2019-08-08 PROCEDURE — G0378 HOSPITAL OBSERVATION PER HR: HCPCS

## 2019-08-08 PROCEDURE — 94664 DEMO&/EVAL PT USE INHALER: CPT

## 2019-08-08 PROCEDURE — 81025 URINE PREGNANCY TEST: CPT

## 2019-08-08 PROCEDURE — 0GTJ0ZZ RESECTION OF THYROID GLAND ISTHMUS, OPEN APPROACH: ICD-10-PCS | Performed by: SURGERY

## 2019-08-08 PROCEDURE — 2500000003 HC RX 250 WO HCPCS: Performed by: SURGERY

## 2019-08-08 PROCEDURE — 88307 TISSUE EXAM BY PATHOLOGIST: CPT

## 2019-08-08 PROCEDURE — 3600000003 HC SURGERY LEVEL 3 BASE: Performed by: SURGERY

## 2019-08-08 PROCEDURE — 6370000000 HC RX 637 (ALT 250 FOR IP): Performed by: SURGERY

## 2019-08-08 PROCEDURE — 0GTG0ZZ RESECTION OF LEFT THYROID GLAND LOBE, OPEN APPROACH: ICD-10-PCS | Performed by: SURGERY

## 2019-08-08 PROCEDURE — 2500000003 HC RX 250 WO HCPCS: Performed by: NURSE ANESTHETIST, CERTIFIED REGISTERED

## 2019-08-08 PROCEDURE — 6360000002 HC RX W HCPCS: Performed by: ANESTHESIOLOGY

## 2019-08-08 PROCEDURE — 7100000000 HC PACU RECOVERY - FIRST 15 MIN: Performed by: SURGERY

## 2019-08-08 PROCEDURE — 2580000003 HC RX 258: Performed by: SURGERY

## 2019-08-08 PROCEDURE — 7100000001 HC PACU RECOVERY - ADDTL 15 MIN: Performed by: SURGERY

## 2019-08-08 PROCEDURE — 6360000002 HC RX W HCPCS: Performed by: SURGERY

## 2019-08-08 PROCEDURE — 6370000000 HC RX 637 (ALT 250 FOR IP): Performed by: NURSE ANESTHETIST, CERTIFIED REGISTERED

## 2019-08-08 PROCEDURE — 88331 PATH CONSLTJ SURG 1 BLK 1SPC: CPT

## 2019-08-08 PROCEDURE — 3700000001 HC ADD 15 MINUTES (ANESTHESIA): Performed by: SURGERY

## 2019-08-08 PROCEDURE — 2709999900 HC NON-CHARGEABLE SUPPLY: Performed by: SURGERY

## 2019-08-08 PROCEDURE — 2580000003 HC RX 258: Performed by: NURSE ANESTHETIST, CERTIFIED REGISTERED

## 2019-08-08 PROCEDURE — 6370000000 HC RX 637 (ALT 250 FOR IP)

## 2019-08-08 PROCEDURE — 1200000000 HC SEMI PRIVATE

## 2019-08-08 PROCEDURE — 80048 BASIC METABOLIC PNL TOTAL CA: CPT

## 2019-08-08 PROCEDURE — 3700000000 HC ANESTHESIA ATTENDED CARE: Performed by: SURGERY

## 2019-08-08 PROCEDURE — 60225 PARTIAL REMOVAL OF THYROID: CPT | Performed by: SURGERY

## 2019-08-08 PROCEDURE — 3600000013 HC SURGERY LEVEL 3 ADDTL 15MIN: Performed by: SURGERY

## 2019-08-08 PROCEDURE — 94761 N-INVAS EAR/PLS OXIMETRY MLT: CPT

## 2019-08-08 RX ORDER — LEVOTHYROXINE SODIUM 0.05 MG/1
50 TABLET ORAL DAILY
Status: DISCONTINUED | OUTPATIENT
Start: 2019-08-08 | End: 2019-08-09 | Stop reason: HOSPADM

## 2019-08-08 RX ORDER — ACETAMINOPHEN 10 MG/ML
1000 INJECTION, SOLUTION INTRAVENOUS ONCE
Status: COMPLETED | OUTPATIENT
Start: 2019-08-08 | End: 2019-08-08

## 2019-08-08 RX ORDER — SODIUM CHLORIDE 0.9 % (FLUSH) 0.9 %
10 SYRINGE (ML) INJECTION EVERY 12 HOURS SCHEDULED
Status: DISCONTINUED | OUTPATIENT
Start: 2019-08-08 | End: 2019-08-09 | Stop reason: HOSPADM

## 2019-08-08 RX ORDER — PROPOFOL 10 MG/ML
INJECTION, EMULSION INTRAVENOUS PRN
Status: DISCONTINUED | OUTPATIENT
Start: 2019-08-08 | End: 2019-08-08 | Stop reason: SDUPTHER

## 2019-08-08 RX ORDER — FENTANYL CITRATE 50 UG/ML
25 INJECTION, SOLUTION INTRAMUSCULAR; INTRAVENOUS EVERY 5 MIN PRN
Status: DISCONTINUED | OUTPATIENT
Start: 2019-08-08 | End: 2019-08-08 | Stop reason: HOSPADM

## 2019-08-08 RX ORDER — ESCITALOPRAM OXALATE 10 MG/1
10 TABLET ORAL DAILY
Status: DISCONTINUED | OUTPATIENT
Start: 2019-08-08 | End: 2019-08-09 | Stop reason: HOSPADM

## 2019-08-08 RX ORDER — ONDANSETRON 2 MG/ML
4 INJECTION INTRAMUSCULAR; INTRAVENOUS
Status: DISCONTINUED | OUTPATIENT
Start: 2019-08-08 | End: 2019-08-08 | Stop reason: HOSPADM

## 2019-08-08 RX ORDER — DEXTROSE, SODIUM CHLORIDE, AND POTASSIUM CHLORIDE 5; .45; .15 G/100ML; G/100ML; G/100ML
INJECTION INTRAVENOUS CONTINUOUS
Status: DISCONTINUED | OUTPATIENT
Start: 2019-08-08 | End: 2019-08-09 | Stop reason: HOSPADM

## 2019-08-08 RX ORDER — HYDRALAZINE HYDROCHLORIDE 20 MG/ML
INJECTION INTRAMUSCULAR; INTRAVENOUS PRN
Status: DISCONTINUED | OUTPATIENT
Start: 2019-08-08 | End: 2019-08-08 | Stop reason: SDUPTHER

## 2019-08-08 RX ORDER — HYDROMORPHONE HCL 110MG/55ML
1 PATIENT CONTROLLED ANALGESIA SYRINGE INTRAVENOUS
Status: DISCONTINUED | OUTPATIENT
Start: 2019-08-08 | End: 2019-08-09 | Stop reason: HOSPADM

## 2019-08-08 RX ORDER — SODIUM CHLORIDE, SODIUM LACTATE, POTASSIUM CHLORIDE, CALCIUM CHLORIDE 600; 310; 30; 20 MG/100ML; MG/100ML; MG/100ML; MG/100ML
INJECTION, SOLUTION INTRAVENOUS CONTINUOUS
Status: DISCONTINUED | OUTPATIENT
Start: 2019-08-08 | End: 2019-08-08

## 2019-08-08 RX ORDER — OXYCODONE HYDROCHLORIDE AND ACETAMINOPHEN 5; 325 MG/1; MG/1
2 TABLET ORAL EVERY 4 HOURS PRN
Status: DISCONTINUED | OUTPATIENT
Start: 2019-08-08 | End: 2019-08-08

## 2019-08-08 RX ORDER — MAGNESIUM SULFATE 1 G/100ML
1 INJECTION INTRAVENOUS PRN
Status: DISCONTINUED | OUTPATIENT
Start: 2019-08-08 | End: 2019-08-09 | Stop reason: HOSPADM

## 2019-08-08 RX ORDER — LIDOCAINE HYDROCHLORIDE 20 MG/ML
INJECTION, SOLUTION INTRAVENOUS PRN
Status: DISCONTINUED | OUTPATIENT
Start: 2019-08-08 | End: 2019-08-08 | Stop reason: SDUPTHER

## 2019-08-08 RX ORDER — SODIUM CHLORIDE, SODIUM LACTATE, POTASSIUM CHLORIDE, CALCIUM CHLORIDE 600; 310; 30; 20 MG/100ML; MG/100ML; MG/100ML; MG/100ML
INJECTION, SOLUTION INTRAVENOUS CONTINUOUS PRN
Status: DISCONTINUED | OUTPATIENT
Start: 2019-08-08 | End: 2019-08-08 | Stop reason: SDUPTHER

## 2019-08-08 RX ORDER — SENNA AND DOCUSATE SODIUM 50; 8.6 MG/1; MG/1
2 TABLET, FILM COATED ORAL 2 TIMES DAILY
Status: DISCONTINUED | OUTPATIENT
Start: 2019-08-08 | End: 2019-08-09 | Stop reason: HOSPADM

## 2019-08-08 RX ORDER — ONDANSETRON 2 MG/ML
INJECTION INTRAMUSCULAR; INTRAVENOUS PRN
Status: DISCONTINUED | OUTPATIENT
Start: 2019-08-08 | End: 2019-08-08 | Stop reason: SDUPTHER

## 2019-08-08 RX ORDER — PSEUDOEPHEDRINE HCL 120 MG/1
120 TABLET, FILM COATED, EXTENDED RELEASE ORAL 2 TIMES DAILY
Status: DISCONTINUED | OUTPATIENT
Start: 2019-08-08 | End: 2019-08-09 | Stop reason: HOSPADM

## 2019-08-08 RX ORDER — HYDROMORPHONE HCL 110MG/55ML
PATIENT CONTROLLED ANALGESIA SYRINGE INTRAVENOUS PRN
Status: DISCONTINUED | OUTPATIENT
Start: 2019-08-08 | End: 2019-08-08 | Stop reason: SDUPTHER

## 2019-08-08 RX ORDER — MIDAZOLAM HYDROCHLORIDE 1 MG/ML
INJECTION INTRAMUSCULAR; INTRAVENOUS PRN
Status: DISCONTINUED | OUTPATIENT
Start: 2019-08-08 | End: 2019-08-08 | Stop reason: SDUPTHER

## 2019-08-08 RX ORDER — HYDRALAZINE HYDROCHLORIDE 20 MG/ML
5 INJECTION INTRAMUSCULAR; INTRAVENOUS EVERY 10 MIN PRN
Status: DISCONTINUED | OUTPATIENT
Start: 2019-08-08 | End: 2019-08-08 | Stop reason: HOSPADM

## 2019-08-08 RX ORDER — SODIUM CHLORIDE 0.9 % (FLUSH) 0.9 %
10 SYRINGE (ML) INJECTION PRN
Status: DISCONTINUED | OUTPATIENT
Start: 2019-08-08 | End: 2019-08-09 | Stop reason: HOSPADM

## 2019-08-08 RX ORDER — POTASSIUM CHLORIDE 7.45 MG/ML
10 INJECTION INTRAVENOUS PRN
Status: DISCONTINUED | OUTPATIENT
Start: 2019-08-08 | End: 2019-08-09 | Stop reason: HOSPADM

## 2019-08-08 RX ORDER — LIDOCAINE HYDROCHLORIDE 40 MG/ML
SOLUTION TOPICAL PRN
Status: DISCONTINUED | OUTPATIENT
Start: 2019-08-08 | End: 2019-08-08 | Stop reason: SDUPTHER

## 2019-08-08 RX ORDER — FENTANYL CITRATE 50 UG/ML
INJECTION, SOLUTION INTRAMUSCULAR; INTRAVENOUS PRN
Status: DISCONTINUED | OUTPATIENT
Start: 2019-08-08 | End: 2019-08-08 | Stop reason: SDUPTHER

## 2019-08-08 RX ORDER — METHOCARBAMOL 500 MG/1
500 TABLET, FILM COATED ORAL 4 TIMES DAILY
Status: DISCONTINUED | OUTPATIENT
Start: 2019-08-08 | End: 2019-08-09 | Stop reason: HOSPADM

## 2019-08-08 RX ORDER — ROCURONIUM BROMIDE 10 MG/ML
INJECTION, SOLUTION INTRAVENOUS PRN
Status: DISCONTINUED | OUTPATIENT
Start: 2019-08-08 | End: 2019-08-08 | Stop reason: SDUPTHER

## 2019-08-08 RX ORDER — ONDANSETRON 2 MG/ML
4 INJECTION INTRAMUSCULAR; INTRAVENOUS EVERY 4 HOURS PRN
Status: DISCONTINUED | OUTPATIENT
Start: 2019-08-08 | End: 2019-08-09 | Stop reason: HOSPADM

## 2019-08-08 RX ORDER — DEXAMETHASONE SODIUM PHOSPHATE 4 MG/ML
INJECTION, SOLUTION INTRA-ARTICULAR; INTRALESIONAL; INTRAMUSCULAR; INTRAVENOUS; SOFT TISSUE PRN
Status: DISCONTINUED | OUTPATIENT
Start: 2019-08-08 | End: 2019-08-08 | Stop reason: SDUPTHER

## 2019-08-08 RX ORDER — PROMETHAZINE HYDROCHLORIDE 25 MG/ML
12.5 INJECTION, SOLUTION INTRAMUSCULAR; INTRAVENOUS EVERY 6 HOURS PRN
Status: DISCONTINUED | OUTPATIENT
Start: 2019-08-08 | End: 2019-08-09 | Stop reason: HOSPADM

## 2019-08-08 RX ORDER — ONDANSETRON 2 MG/ML
4 INJECTION INTRAMUSCULAR; INTRAVENOUS EVERY 4 HOURS
Status: DISCONTINUED | OUTPATIENT
Start: 2019-08-08 | End: 2019-08-09 | Stop reason: HOSPADM

## 2019-08-08 RX ORDER — ACETAMINOPHEN 500 MG
500 TABLET ORAL EVERY 4 HOURS PRN
Status: DISCONTINUED | OUTPATIENT
Start: 2019-08-08 | End: 2019-08-09 | Stop reason: HOSPADM

## 2019-08-08 RX ADMIN — POTASSIUM CHLORIDE, DEXTROSE MONOHYDRATE AND SODIUM CHLORIDE: 150; 5; 450 INJECTION, SOLUTION INTRAVENOUS at 10:14

## 2019-08-08 RX ADMIN — ONDANSETRON 4 MG: 2 INJECTION INTRAMUSCULAR; INTRAVENOUS at 08:56

## 2019-08-08 RX ADMIN — HYDRALAZINE HYDROCHLORIDE 10 MG: 20 INJECTION INTRAMUSCULAR; INTRAVENOUS at 09:13

## 2019-08-08 RX ADMIN — PROMETHAZINE HYDROCHLORIDE 12.5 MG: 25 INJECTION INTRAMUSCULAR; INTRAVENOUS at 13:18

## 2019-08-08 RX ADMIN — HYDROMORPHONE HYDROCHLORIDE 1 MG: 2 INJECTION INTRAMUSCULAR; INTRAVENOUS; SUBCUTANEOUS at 08:07

## 2019-08-08 RX ADMIN — SODIUM CHLORIDE, POTASSIUM CHLORIDE, SODIUM LACTATE AND CALCIUM CHLORIDE: 600; 310; 30; 20 INJECTION, SOLUTION INTRAVENOUS at 07:00

## 2019-08-08 RX ADMIN — PSEUDOEPHEDRINE HYDROCHLORIDE 120 MG: 120 TABLET, FILM COATED, EXTENDED RELEASE ORAL at 20:47

## 2019-08-08 RX ADMIN — HYDROMORPHONE HYDROCHLORIDE 1 MG: 2 INJECTION, SOLUTION INTRAMUSCULAR; INTRAVENOUS; SUBCUTANEOUS at 20:58

## 2019-08-08 RX ADMIN — PSEUDOEPHEDRINE HYDROCHLORIDE 120 MG: 120 TABLET, FILM COATED, EXTENDED RELEASE ORAL at 12:53

## 2019-08-08 RX ADMIN — FENTANYL CITRATE 50 MCG: 50 INJECTION INTRAMUSCULAR; INTRAVENOUS at 09:10

## 2019-08-08 RX ADMIN — ESCITALOPRAM OXALATE 10 MG: 10 TABLET ORAL at 15:47

## 2019-08-08 RX ADMIN — SUGAMMADEX 200 MG: 100 INJECTION, SOLUTION INTRAVENOUS at 09:15

## 2019-08-08 RX ADMIN — ONDANSETRON 4 MG: 2 INJECTION INTRAMUSCULAR; INTRAVENOUS at 11:26

## 2019-08-08 RX ADMIN — DEXAMETHASONE SODIUM PHOSPHATE 8 MG: 4 INJECTION, SOLUTION INTRAMUSCULAR; INTRAVENOUS at 07:44

## 2019-08-08 RX ADMIN — LEVOTHYROXINE SODIUM 50 MCG: 50 TABLET ORAL at 15:47

## 2019-08-08 RX ADMIN — PHENYLEPHRINE HYDROCHLORIDE 100 MCG: 10 INJECTION INTRAVENOUS at 07:52

## 2019-08-08 RX ADMIN — Medication 10 ML: at 20:48

## 2019-08-08 RX ADMIN — ONDANSETRON 4 MG: 2 INJECTION INTRAMUSCULAR; INTRAVENOUS at 15:46

## 2019-08-08 RX ADMIN — FENTANYL CITRATE 25 MCG: 50 INJECTION INTRAMUSCULAR; INTRAVENOUS at 10:09

## 2019-08-08 RX ADMIN — HYDROMORPHONE HYDROCHLORIDE 1 MG: 2 INJECTION, SOLUTION INTRAMUSCULAR; INTRAVENOUS; SUBCUTANEOUS at 23:16

## 2019-08-08 RX ADMIN — DEXTROSE MONOHYDRATE 3 G: 50 INJECTION, SOLUTION INTRAVENOUS at 20:47

## 2019-08-08 RX ADMIN — PROPOFOL 170 MG: 10 INJECTION, EMULSION INTRAVENOUS at 07:37

## 2019-08-08 RX ADMIN — FENTANYL CITRATE 50 MCG: 50 INJECTION INTRAMUSCULAR; INTRAVENOUS at 08:09

## 2019-08-08 RX ADMIN — DEXTROSE MONOHYDRATE 3 G: 50 INJECTION, SOLUTION INTRAVENOUS at 07:48

## 2019-08-08 RX ADMIN — ONDANSETRON 4 MG: 2 INJECTION INTRAMUSCULAR; INTRAVENOUS at 18:49

## 2019-08-08 RX ADMIN — ACETAMINOPHEN 1000 MG: 10 INJECTION, SOLUTION INTRAVENOUS at 08:56

## 2019-08-08 RX ADMIN — ROCURONIUM BROMIDE 50 MG: 10 INJECTION INTRAVENOUS at 07:37

## 2019-08-08 RX ADMIN — SODIUM CHLORIDE, POTASSIUM CHLORIDE, SODIUM LACTATE AND CALCIUM CHLORIDE: 600; 310; 30; 20 INJECTION, SOLUTION INTRAVENOUS at 07:54

## 2019-08-08 RX ADMIN — FENTANYL CITRATE 25 MCG: 50 INJECTION INTRAMUSCULAR; INTRAVENOUS at 10:26

## 2019-08-08 RX ADMIN — ONDANSETRON 4 MG: 2 INJECTION INTRAMUSCULAR; INTRAVENOUS at 23:19

## 2019-08-08 RX ADMIN — ACETAMINOPHEN 500 MG: 500 TABLET ORAL at 11:25

## 2019-08-08 RX ADMIN — HYDROMORPHONE HYDROCHLORIDE 1 MG: 2 INJECTION, SOLUTION INTRAMUSCULAR; INTRAVENOUS; SUBCUTANEOUS at 13:22

## 2019-08-08 RX ADMIN — SENNOSIDES, DOCUSATE SODIUM 2 TABLET: 50; 8.6 TABLET, FILM COATED ORAL at 20:47

## 2019-08-08 RX ADMIN — DEXTROSE MONOHYDRATE 3 G: 50 INJECTION, SOLUTION INTRAVENOUS at 12:54

## 2019-08-08 RX ADMIN — LIDOCAINE HYDROCHLORIDE 100 MG: 20 INJECTION, SOLUTION INTRAVENOUS at 07:37

## 2019-08-08 RX ADMIN — POTASSIUM CHLORIDE, DEXTROSE MONOHYDRATE AND SODIUM CHLORIDE: 150; 5; 450 INJECTION, SOLUTION INTRAVENOUS at 18:26

## 2019-08-08 RX ADMIN — HYDROMORPHONE HYDROCHLORIDE 1 MG: 2 INJECTION, SOLUTION INTRAMUSCULAR; INTRAVENOUS; SUBCUTANEOUS at 18:49

## 2019-08-08 RX ADMIN — FENTANYL CITRATE 100 MCG: 50 INJECTION INTRAMUSCULAR; INTRAVENOUS at 07:37

## 2019-08-08 RX ADMIN — MIDAZOLAM HYDROCHLORIDE 2 MG: 1 INJECTION, SOLUTION INTRAMUSCULAR; INTRAVENOUS at 07:26

## 2019-08-08 RX ADMIN — HYDROMORPHONE HYDROCHLORIDE 1 MG: 2 INJECTION, SOLUTION INTRAMUSCULAR; INTRAVENOUS; SUBCUTANEOUS at 15:44

## 2019-08-08 RX ADMIN — LIDOCAINE HYDROCHLORIDE 4 ML: 40 SOLUTION TOPICAL at 07:41

## 2019-08-08 ASSESSMENT — PULMONARY FUNCTION TESTS
PIF_VALUE: 27
PIF_VALUE: 23
PIF_VALUE: 1
PIF_VALUE: 25
PIF_VALUE: 24
PIF_VALUE: 23
PIF_VALUE: 23
PIF_VALUE: 11
PIF_VALUE: 26
PIF_VALUE: 23
PIF_VALUE: 1
PIF_VALUE: 0
PIF_VALUE: 23
PIF_VALUE: 24
PIF_VALUE: 23
PIF_VALUE: 26
PIF_VALUE: 23
PIF_VALUE: 0
PIF_VALUE: 24
PIF_VALUE: 11
PIF_VALUE: 1
PIF_VALUE: 28
PIF_VALUE: 22
PIF_VALUE: 23
PIF_VALUE: 24
PIF_VALUE: 23
PIF_VALUE: 13
PIF_VALUE: 5
PIF_VALUE: 26
PIF_VALUE: 28
PIF_VALUE: 23
PIF_VALUE: 22
PIF_VALUE: 23
PIF_VALUE: 25
PIF_VALUE: 23
PIF_VALUE: 23
PIF_VALUE: 22
PIF_VALUE: 23
PIF_VALUE: 10
PIF_VALUE: 26
PIF_VALUE: 0
PIF_VALUE: 27
PIF_VALUE: 5
PIF_VALUE: 24
PIF_VALUE: 23
PIF_VALUE: 25
PIF_VALUE: 23
PIF_VALUE: 10
PIF_VALUE: 23
PIF_VALUE: 23
PIF_VALUE: 11
PIF_VALUE: 25
PIF_VALUE: 23
PIF_VALUE: 23
PIF_VALUE: 29
PIF_VALUE: 27
PIF_VALUE: 25
PIF_VALUE: 25
PIF_VALUE: 23
PIF_VALUE: 0
PIF_VALUE: 23
PIF_VALUE: 26
PIF_VALUE: 23
PIF_VALUE: 5
PIF_VALUE: 23
PIF_VALUE: 28
PIF_VALUE: 36
PIF_VALUE: 0
PIF_VALUE: 28
PIF_VALUE: 23
PIF_VALUE: 23
PIF_VALUE: 0
PIF_VALUE: 25
PIF_VALUE: 23
PIF_VALUE: 24
PIF_VALUE: 2
PIF_VALUE: 23
PIF_VALUE: 5
PIF_VALUE: 23
PIF_VALUE: 1
PIF_VALUE: 23
PIF_VALUE: 25
PIF_VALUE: 25
PIF_VALUE: 22
PIF_VALUE: 24
PIF_VALUE: 23
PIF_VALUE: 1
PIF_VALUE: 30
PIF_VALUE: 20
PIF_VALUE: 23
PIF_VALUE: 23
PIF_VALUE: 22
PIF_VALUE: 0
PIF_VALUE: 26
PIF_VALUE: 0
PIF_VALUE: 27
PIF_VALUE: 0
PIF_VALUE: 24
PIF_VALUE: 23
PIF_VALUE: 24
PIF_VALUE: 23
PIF_VALUE: 28
PIF_VALUE: 25
PIF_VALUE: 26
PIF_VALUE: 27
PIF_VALUE: 22
PIF_VALUE: 23
PIF_VALUE: 23
PIF_VALUE: 24
PIF_VALUE: 23
PIF_VALUE: 24
PIF_VALUE: 23
PIF_VALUE: 23
PIF_VALUE: 26
PIF_VALUE: 23
PIF_VALUE: 27
PIF_VALUE: 10
PIF_VALUE: 5
PIF_VALUE: 0
PIF_VALUE: 23

## 2019-08-08 ASSESSMENT — PAIN SCALES - GENERAL
PAINLEVEL_OUTOF10: 0
PAINLEVEL_OUTOF10: 7
PAINLEVEL_OUTOF10: 5
PAINLEVEL_OUTOF10: 3
PAINLEVEL_OUTOF10: 4
PAINLEVEL_OUTOF10: 2
PAINLEVEL_OUTOF10: 5
PAINLEVEL_OUTOF10: 7
PAINLEVEL_OUTOF10: 5
PAINLEVEL_OUTOF10: 5

## 2019-08-08 ASSESSMENT — PAIN DESCRIPTION - FREQUENCY
FREQUENCY: CONTINUOUS
FREQUENCY: CONTINUOUS

## 2019-08-08 ASSESSMENT — PAIN DESCRIPTION - PAIN TYPE
TYPE: SURGICAL PAIN
TYPE: ACUTE PAIN;SURGICAL PAIN
TYPE: ACUTE PAIN;SURGICAL PAIN
TYPE: SURGICAL PAIN

## 2019-08-08 ASSESSMENT — PAIN - FUNCTIONAL ASSESSMENT
PAIN_FUNCTIONAL_ASSESSMENT: ACTIVITIES ARE NOT PREVENTED
PAIN_FUNCTIONAL_ASSESSMENT: ACTIVITIES ARE NOT PREVENTED
PAIN_FUNCTIONAL_ASSESSMENT: 0-10

## 2019-08-08 ASSESSMENT — PAIN DESCRIPTION - LOCATION
LOCATION: NECK

## 2019-08-08 ASSESSMENT — PAIN DESCRIPTION - ONSET
ONSET: ON-GOING
ONSET: ON-GOING

## 2019-08-08 ASSESSMENT — PAIN DESCRIPTION - ORIENTATION
ORIENTATION: MID

## 2019-08-08 ASSESSMENT — PAIN DESCRIPTION - DESCRIPTORS
DESCRIPTORS: DISCOMFORT
DESCRIPTORS: DISCOMFORT
DESCRIPTORS: ACHING;CONSTANT;DISCOMFORT;THROBBING
DESCRIPTORS: ACHING;CONSTANT;DISCOMFORT

## 2019-08-08 ASSESSMENT — PAIN DESCRIPTION - PROGRESSION
CLINICAL_PROGRESSION: NOT CHANGED

## 2019-08-08 NOTE — PROGRESS NOTES
3331 patient received from the OR monitor placed and alarms on. Report from [facility].   7459 patient awake tolerating ice chips   1000 turned and extra linen removed without difficulty   1035 resting quietly denies any needs   1040 transferred to room 4012 via bed . Report given to [facility]. Patient family at bedside with belongings.

## 2019-08-08 NOTE — CONSULTS
Endocrinology   Consult Note  Dear Doctor Anthony Downs    Thank You for the Consult     Pt. Was Admitted for : Thyroidectomy, left and is  Reason for Consult: Better control of thyroid problems      History Obtained From:  Patient/ EMR       HISTORY OF PRESENT ILLNESS:                The patient is a 29 y.o. female with significant past medical history of thyroid nodule, follicular adenoma, hypothyroidism, migraine headache, polycystic ovarian syndrome was worked up in the office for thyroid problem she is sure that she has follicular adenoma so the decision was made to admit to the hospital for left lobe and instruments thyroidectomy. This was accomplished this morning I was  consulted for better control of thyroid issues. ROS:   Pt's ROS done in detail. Abnormal ROS are noted in Medical and Surgical History Section below: Other Medical History:        Diagnosis Date    Depression     Hypothyroidism     Migraines     Last migraine: 7/30/19    PCOS (polycystic ovarian syndrome)     Thyroid disease Dx: 2017    Follows with PCP     Surgical History:        Procedure Laterality Date    DILATION AND CURETTAGE OF UTERUS  08/01/2018    OVARIAN CYST REMOVAL Right 08/01/2018    done with D & C       Allergies:  Codeine; Pcn [penicillins]; and Cheese    Family History:       Problem Relation Age of Onset   [facility] Cancer Mother         Breast    Arthritis Mother     Hypertension Father     COPD Father     Hypertension Sister     Diabetes Other      REVIEW OF SYSTEMS:  Review of System Done as noted above     PHYSICAL EXAM:      Vitals:    /65   Pulse 99   Temp 98.2 °F (36.8 °C) (Oral)   Resp 15   Ht 5' 4\" (1.626 m)   Wt 283 lb (128.4 kg)   LMP 07/11/2019 (Exact Date)   SpO2 95%   BMI 48.58 kg/m²     CONSTITUTIONAL:  awake, alert, cooperative, appears stated age   EYES:  vision intact Fundoscopic Exam not performed   ENT:Normal  NECK:  Supple, No JVD.    Thyroid Exam: Scar of Left hemithyroidectomy with isthmusectomy  LUNGS:  Has Vesicular Breath Sounds,   CARDIOVASCULAR:  Normal apical impulse, regular rate and rhythm, normal S1 and S2, no S3 or S4, and has no  murmur   ABDOMEN:  No scars, normal bowel sounds, soft, non-distended, non-tender, no masses palpated, no hepatolienomegaly morbid obesity  Musculoskeletal: Normal  Extremities: Normal, peripheral pulses normal, , has no edema   NEUROLOGIC:  Awake, alert, oriented to name, place and time. Cranial nerves II-XII are grossly intact. Motor is  intact. Sensory is intact. ,  and gait is normal.    DATA:    CBC:   Recent Labs     08/08/19  0630   WBC 8.8   HGB 13.7       CMP:No results for input(s): NA, K, CL, CO2, BUN, CREATININE, CALCIUM, PROT, LABALBU, BILITOT, ALKPHOS, AST, ALT in the last 72 hours. Invalid input(s): GLU  Lipids: No results found for: CHOL, HDL, TRIG  Glucose: No results for input(s): POCGLU in the last 72 hours. Hemoglobin A1C: No results found for: LABA1C  Free T4:   Lab Results   Component Value Date    T4FREE 1.24 06/18/2019     Free T3: No results found for: FT3  TSH High Sensitivity:   Lab Results   Component Value Date    TSHHS 1.580 06/18/2019       No results found.     Scheduled Medicines   Medications:    escitalopram  10 mg Oral Daily    levothyroxine  50 mcg Oral Daily    methocarbamol  500 mg Oral 4x Daily    sodium chloride flush  10 mL Intravenous 2 times per day    ceFAZolin (ANCEF) IVPB  3 g Intravenous Q8H    [START ON 8/9/2019] enoxaparin  40 mg Subcutaneous Daily    sennosides-docusate sodium  2 tablet Oral BID    ondansetron  4 mg Intravenous Q4H    pseudoephedrine  120 mg Oral BID      Infusions:    dextrose 5% and 0.45% NaCl with KCl 20 mEq 125 mL/hr at 08/08/19 6076         IMPRESSION    Patient Active Problem List   Diagnosis    Follicular neoplasm of thyroid    Follicular adenoma of thyroid gland         Morbid obesity         PCOS          Status  post left hemithyroidectomy with isthmusectomy    RECOMMENDATIONS:      1. Reviewed POC blood glucose . Labs and X ray results   2. Reviewed Home and Current Medicines   3. Will continue to monitor serum calcium   4. advised patient to report any symptoms of hypocalcemia occurs. Will follow with you  Again thank you for sharing pt's care with me.      Truly yours,       Neville Madrigal MD

## 2019-08-08 NOTE — OP NOTE
OPERATIVE / PROCEDURE NOTE    Adryan Kendall, 1990, 29 y.o.,  female, CSN: [id number]  August 8, 2019    PREOPERATIVE DIAGNOSIS:  Left thyroid nodule with follicular pathology. POSTOPERATIVE DIAGNOSIS:  Same with frozen section final pathology pending. PROCEDURE DONE:  Left hemithyroidectomy with isthmusectomy. PATHOLOGY:  As above mentioned. ASSISTANT:  None. ANESTHESIA:  General endotracheal anesthesia, as well as local anesthesia  preoperatively and postoperatively, using 1% Xylocaine with epinephrine and  0.5% Marcaine 50/50 mixture. SPECIMEN:  Left Thyroid gland in its entirety, with the isthmus. ESTIMATED BLOOD LOSS:  Less than 10 mL. DRAINS:  None. COMPLICATIONS:   None. CONDITION:  Stable, extubated to the PACU with both vocal cords witnessed  after surgery, upon extubation, to be nicely abducting without any problems. OPERATIVE DESCRIPTION:  After properly informed consent was signed by the  patient knowing all the risks, benefits, potential complications, and  possible alternatives of the procedure; The patient was brought to the operating room and was placed supine on the  operating room table. After institution of general endotracheal anesthesia  after she had received 3 g of Ancef IV, she was placed supine on the  operating room table with the neck extended with a shoulder rest.  The neck  was prepped and draped in usual sterile fashion. The above-mentioned  anesthetic mixture was used to anesthetize the planned incision site, marked  parallel to the skin crease, 2 fingerbreadths above the sternal notch. An  incision was performed from the mid sternocleidomastoid muscle on the right  to the mid sternocleidomastoid muscle on the left. Dissection was carried  down all the way to the platysma. The platysma was divided about 0.5 cm  above the skin incision.   Inferior flaps were developed deep to the platysma,  superficial investing layer of the neck, preserving the left anterior jugular  vein even though the right anterior jugular vein was not present. She did  have right cervical anterior exposure for C-spine surgery in the past.     After the superior plane was developed all the way to the thyroid notch  superiorly and to the clavicle and suprasternal notch inferiorly, the  self-retaining thyroid retractor was placed and then the midline raphe was  divided, dividing the strap muscles on each side. A plane was developed  superficial to the thyroid capsule and deep to the strap muscle. Attention was paid to the left middle  thyroid vein, which was identified, electrocoagulated and divided. The left  superior pole was likewise dissected. The external laryngeal nerve  identified and safeguarded. The left superior parathyroid gland was  identified and safeguarded with its blood supply. The left superior thyroid  vessels were circumferentially skeletonized, individually electrocoagulated  and divided. The right inferior thyroid pole was then circumferentially  skeletonized. The recurrent laryngeal nerve was identified. Left inferior  parathyroid gland was identified and safeguarded. Then the left inferior  thyroid vessels were sequentially individually electrocoagulated and divided  close to the thyroid gland. No bleeding encountered throughout the whole  procedure. The gland was then dissected from lateral to medial, all the way  to the tracheoesophageal groove where the recurrent laryngeal was identified  and safeguarded. Then the gland was completely dissected and  from  the trachea. It was sent to frozen section which confirmed a follicular pathology and no cancer. Perfect hemostasis was noted. A pieces of Surgicel were placed in the  left thyroid bed. The strap muscles were reapproximated using 3-0 Vicryl.   The  platysma was reapproximated using 3-0 Vicryl in a running, continuous fashion  and the skin was

## 2019-08-08 NOTE — PROGRESS NOTES
08/08/19 1113   Oxygen Therapy/Pulse Ox   O2 Therapy Room air   SpO2 95 %   $Pulse Oximeter $Spot check (multiple/continuous)   Blood Gas  Performed?  No   Treatment   Treatment Type IS   Incentive Spirometry Tx   Treatment Tolerance Well   Incentive Spirometry Achieved (mL) 2000 mL   Cough/Sputum   Sputum How Obtained Spontaneous cough   Cough Non-productive   RT Misc Charges   $RT Education $HHN/MDI/IPPB Demo or Eval   initiated IS and educated patient on the importance and use of IS

## 2019-08-09 VITALS
OXYGEN SATURATION: 96 % | TEMPERATURE: 98.1 F | BODY MASS INDEX: 48.32 KG/M2 | DIASTOLIC BLOOD PRESSURE: 82 MMHG | HEART RATE: 75 BPM | RESPIRATION RATE: 20 BRPM | WEIGHT: 283 LBS | HEIGHT: 64 IN | SYSTOLIC BLOOD PRESSURE: 137 MMHG

## 2019-08-09 LAB
ANION GAP SERPL CALCULATED.3IONS-SCNC: 8 MMOL/L (ref 4–16)
BASOPHILS ABSOLUTE: 0 K/CU MM
BASOPHILS RELATIVE PERCENT: 0.1 % (ref 0–1)
BUN BLDV-MCNC: 5 MG/DL (ref 6–23)
CALCIUM SERPL-MCNC: 8.8 MG/DL (ref 8.3–10.6)
CHLORIDE BLD-SCNC: 105 MMOL/L (ref 99–110)
CO2: 25 MMOL/L (ref 21–32)
CREAT SERPL-MCNC: 0.8 MG/DL (ref 0.6–1.1)
DIFFERENTIAL TYPE: ABNORMAL
EOSINOPHILS ABSOLUTE: 0 K/CU MM
EOSINOPHILS RELATIVE PERCENT: 0.1 % (ref 0–3)
GFR AFRICAN AMERICAN: >60 ML/MIN/1.73M2
GFR NON-AFRICAN AMERICAN: >60 ML/MIN/1.73M2
GLUCOSE BLD-MCNC: 135 MG/DL (ref 70–99)
HCT VFR BLD CALC: 39.9 % (ref 37–47)
HEMOGLOBIN: 12.5 GM/DL (ref 12.5–16)
IMMATURE NEUTROPHIL %: 0.5 % (ref 0–0.43)
LYMPHOCYTES ABSOLUTE: 1.9 K/CU MM
LYMPHOCYTES RELATIVE PERCENT: 14.3 % (ref 24–44)
MCH RBC QN AUTO: 29.2 PG (ref 27–31)
MCHC RBC AUTO-ENTMCNC: 31.3 % (ref 32–36)
MCV RBC AUTO: 93.2 FL (ref 78–100)
MONOCYTES ABSOLUTE: 0.8 K/CU MM
MONOCYTES RELATIVE PERCENT: 5.8 % (ref 0–4)
NUCLEATED RBC %: 0 %
PDW BLD-RTO: 13.4 % (ref 11.7–14.9)
PLATELET # BLD: 321 K/CU MM (ref 140–440)
PMV BLD AUTO: 10 FL (ref 7.5–11.1)
POTASSIUM SERPL-SCNC: 4.9 MMOL/L (ref 3.5–5.1)
RBC # BLD: 4.28 M/CU MM (ref 4.2–5.4)
SEGMENTED NEUTROPHILS ABSOLUTE COUNT: 10.6 K/CU MM
SEGMENTED NEUTROPHILS RELATIVE PERCENT: 79.2 % (ref 36–66)
SODIUM BLD-SCNC: 138 MMOL/L (ref 135–145)
TOTAL IMMATURE NEUTOROPHIL: 0.07 K/CU MM
TOTAL NUCLEATED RBC: 0 K/CU MM
WBC # BLD: 13.4 K/CU MM (ref 4–10.5)

## 2019-08-09 PROCEDURE — 94150 VITAL CAPACITY TEST: CPT

## 2019-08-09 PROCEDURE — 2500000003 HC RX 250 WO HCPCS: Performed by: SURGERY

## 2019-08-09 PROCEDURE — 99024 POSTOP FOLLOW-UP VISIT: CPT | Performed by: SURGERY

## 2019-08-09 PROCEDURE — G0378 HOSPITAL OBSERVATION PER HR: HCPCS

## 2019-08-09 PROCEDURE — 80048 BASIC METABOLIC PNL TOTAL CA: CPT

## 2019-08-09 PROCEDURE — 85025 COMPLETE CBC W/AUTO DIFF WBC: CPT

## 2019-08-09 PROCEDURE — 94761 N-INVAS EAR/PLS OXIMETRY MLT: CPT

## 2019-08-09 PROCEDURE — 36415 COLL VENOUS BLD VENIPUNCTURE: CPT

## 2019-08-09 PROCEDURE — 6360000002 HC RX W HCPCS: Performed by: SURGERY

## 2019-08-09 PROCEDURE — 6370000000 HC RX 637 (ALT 250 FOR IP): Performed by: SURGERY

## 2019-08-09 RX ORDER — OXYCODONE HYDROCHLORIDE AND ACETAMINOPHEN 5; 325 MG/1; MG/1
1-2 TABLET ORAL EVERY 6 HOURS PRN
Qty: 35 TABLET | Refills: 0 | Status: SHIPPED | OUTPATIENT
Start: 2019-08-09 | End: 2019-08-13

## 2019-08-09 RX ORDER — AMOXICILLIN 250 MG
2 CAPSULE ORAL DAILY
Qty: 60 TABLET | Refills: 3 | Status: SHIPPED | OUTPATIENT
Start: 2019-08-09 | End: 2019-08-13

## 2019-08-09 RX ADMIN — HYDROMORPHONE HYDROCHLORIDE 1 MG: 2 INJECTION, SOLUTION INTRAMUSCULAR; INTRAVENOUS; SUBCUTANEOUS at 03:35

## 2019-08-09 RX ADMIN — ONDANSETRON 4 MG: 2 INJECTION INTRAMUSCULAR; INTRAVENOUS at 03:33

## 2019-08-09 RX ADMIN — HYDROMORPHONE HYDROCHLORIDE 1 MG: 2 INJECTION, SOLUTION INTRAMUSCULAR; INTRAVENOUS; SUBCUTANEOUS at 08:03

## 2019-08-09 RX ADMIN — POTASSIUM CHLORIDE, DEXTROSE MONOHYDRATE AND SODIUM CHLORIDE: 150; 5; 450 INJECTION, SOLUTION INTRAVENOUS at 02:53

## 2019-08-09 RX ADMIN — ACETAMINOPHEN 500 MG: 500 TABLET ORAL at 10:21

## 2019-08-09 RX ADMIN — HYDROMORPHONE HYDROCHLORIDE 1 MG: 2 INJECTION, SOLUTION INTRAMUSCULAR; INTRAVENOUS; SUBCUTANEOUS at 05:53

## 2019-08-09 RX ADMIN — PSEUDOEPHEDRINE HYDROCHLORIDE 120 MG: 120 TABLET, FILM COATED, EXTENDED RELEASE ORAL at 09:19

## 2019-08-09 RX ADMIN — HYDROMORPHONE HYDROCHLORIDE 1 MG: 2 INJECTION, SOLUTION INTRAMUSCULAR; INTRAVENOUS; SUBCUTANEOUS at 01:26

## 2019-08-09 RX ADMIN — SENNOSIDES, DOCUSATE SODIUM 2 TABLET: 50; 8.6 TABLET, FILM COATED ORAL at 09:20

## 2019-08-09 RX ADMIN — ONDANSETRON 4 MG: 2 INJECTION INTRAMUSCULAR; INTRAVENOUS at 08:04

## 2019-08-09 ASSESSMENT — PAIN DESCRIPTION - PROGRESSION
CLINICAL_PROGRESSION: NOT CHANGED
CLINICAL_PROGRESSION: GRADUALLY IMPROVING
CLINICAL_PROGRESSION: NOT CHANGED

## 2019-08-09 ASSESSMENT — PAIN DESCRIPTION - PAIN TYPE
TYPE: ACUTE PAIN;SURGICAL PAIN

## 2019-08-09 ASSESSMENT — PAIN DESCRIPTION - ONSET
ONSET: ON-GOING

## 2019-08-09 ASSESSMENT — PAIN DESCRIPTION - DESCRIPTORS
DESCRIPTORS: THROBBING;DULL
DESCRIPTORS: THROBBING
DESCRIPTORS: THROBBING

## 2019-08-09 ASSESSMENT — PAIN DESCRIPTION - LOCATION
LOCATION: NECK

## 2019-08-09 ASSESSMENT — PAIN - FUNCTIONAL ASSESSMENT
PAIN_FUNCTIONAL_ASSESSMENT: ACTIVITIES ARE NOT PREVENTED

## 2019-08-09 ASSESSMENT — PAIN DESCRIPTION - FREQUENCY
FREQUENCY: CONTINUOUS

## 2019-08-09 ASSESSMENT — PAIN DESCRIPTION - ORIENTATION
ORIENTATION: MID

## 2019-08-09 ASSESSMENT — PAIN SCALES - GENERAL
PAINLEVEL_OUTOF10: 3
PAINLEVEL_OUTOF10: 4

## 2019-08-09 NOTE — PROGRESS NOTES
GENERAL SURGERY PROGRESS NOTE    CC/HPI:           Patient feels better from yestereday's surgery. Vitals:    08/08/19 1605 08/08/19 2051 08/09/19 0122 08/09/19 0551   BP: 126/65 122/75 123/64 110/70   Pulse: 99 90 71 78   Resp: 15 16 16 17   Temp: 98.2 °F (36.8 °C) 98 °F (36.7 °C) 97.4 °F (36.3 °C) 97.5 °F (36.4 °C)   TempSrc: Oral Oral Oral Oral   SpO2: 95% 96% 95% 96%   Weight:       Height:         I/O last 3 completed shifts: In: 7893 [P.O.:720; I.V.:1100]  Out: 2280 [Urine:2275; Blood:5]  I/O this shift:  In: 1612.9 [P.O.:240; I.V.:1272.9; IV Piggyback:100]  Out: 500 [Urine:500]    DIET GENERAL;    Recent Results (from the past 48 hour(s))   CBC    Collection Time: 08/08/19  6:30 AM   Result Value Ref Range    WBC 8.8 4.0 - 10.5 K/CU MM    RBC 4.68 4.2 - 5.4 M/CU MM    Hemoglobin 13.7 12.5 - 16.0 GM/DL    Hematocrit 41.7 37 - 47 %    MCV 89.1 78 - 100 FL    MCH 29.3 27 - 31 PG    MCHC 32.9 32.0 - 36.0 %    RDW 13.1 11.7 - 14.9 %    Platelets 820 126 - 829 K/CU MM    MPV 10.2 7.5 - 11.1 FL   SPECIMEN REJECTION    Collection Time: 08/08/19  6:30 AM   Result Value Ref Range    Rejected Test EBCG     Reason for Rejection UNABLE TO PERFORM TESTING:     Reason for Rejection SPECIMEN HEMOLYZED    POC Pregnancy Urine Qual    Collection Time: 08/08/19  6:37 AM   Result Value Ref Range    Preg Test, Ur NEGATIVE NEGATIVE    Preg Test, Ur (NOTE)  PERFORMED BY POINT OF CARE METHOD.    NEGATIVE   Basic Metabolic Panel w/ Reflex to MG    Collection Time: 08/09/19  4:41 AM   Result Value Ref Range    Sodium 138 135 - 145 MMOL/L    Potassium 4.9 3.5 - 5.1 MMOL/L    Chloride 105 99 - 110 mMol/L    CO2 25 21 - 32 MMOL/L    Anion Gap 8 4 - 16    BUN 5 (L) 6 - 23 MG/DL    CREATININE 0.8 0.6 - 1.1 MG/DL    Glucose 135 (H) 70 - 99 MG/DL    Calcium 8.8 8.3 - 10.6 MG/DL    GFR Non-African American >60 >60 mL/min/1.73m2    GFR African American >60 >60 mL/min/1.73m2   CBC auto differential    Collection Time: 08/09/19 4:41 AM   Result Value Ref Range    WBC 13.4 (H) 4.0 - 10.5 K/CU MM    RBC 4.28 4.2 - 5.4 M/CU MM    Hemoglobin 12.5 12.5 - 16.0 GM/DL    Hematocrit 39.9 37 - 47 %    MCV 93.2 78 - 100 FL    MCH 29.2 27 - 31 PG    MCHC 31.3 (L) 32.0 - 36.0 %    RDW 13.4 11.7 - 14.9 %    Platelets 377 713 - 687 K/CU MM    MPV 10.0 7.5 - 11.1 FL    Differential Type AUTOMATED DIFFERENTIAL     Segs Relative 79.2 (H) 36 - 66 %    Lymphocytes % 14.3 (L) 24 - 44 %    Monocytes % 5.8 (H) 0 - 4 %    Eosinophils % 0.1 0 - 3 %    Basophils % 0.1 0 - 1 %    Segs Absolute 10.6 K/CU MM    Lymphocytes # 1.9 K/CU MM    Monocytes # 0.8 K/CU MM    Eosinophils # 0.0 K/CU MM    Basophils # 0.0 K/CU MM    Nucleated RBC % 0.0 %    Total Nucleated RBC 0.0 K/CU MM    Total Immature Neutrophil 0.07 K/CU MM    Immature Neutrophil % 0.5 (H) 0 - 0.43 %       Scheduled Meds:   escitalopram  10 mg Oral Daily    levothyroxine  50 mcg Oral Daily    methocarbamol  500 mg Oral 4x Daily    sodium chloride flush  10 mL Intravenous 2 times per day    enoxaparin  40 mg Subcutaneous Daily    sennosides-docusate sodium  2 tablet Oral BID    ondansetron  4 mg Intravenous Q4H    pseudoephedrine  120 mg Oral BID       Continuous Infusions:   dextrose 5% and 0.45% NaCl with KCl 20 mEq 125 mL/hr at 08/09/19 0253       Physical Exam:  HEENT: Anicteric sclerae, Oropharyngeal mucosae moist, pink and intact. Heart:  Normal S1 and S2, RRR  Lungs: Clear to auscultation bilaterally, No audible Wheezes or Rales. Extremities: No edema. Neuro: Alert and Oriented x 3, Non focal.  Abdomen: Soft, Benign, Non tender, Non distended, Positive bowel sounds. Incision: Nicely healing: No erythema, No discharge. Active Problems:    Follicular adenoma of thyroid gland  Resolved Problems:    * No resolved hospital problems.  *      Assessment and Plan:  Isabell Liriano is a 29 y.o. female who is POD # 1 status post:  Left hemithyroidectomy with isthmus-ectomy for Left thyroid nodule with follicular pathology. Doing very well, no issues, will discharge and f/u in clinic next week after the final pathology will be out. Increase Ambulation to at least 4x/day walk in the hallways with assistance. Respiratory adela-operative care: Incentive Spirometry / deep breathing and coughing 10x/hr while awake. Continue DVT prophylaxis with Teds and SCDs and SC Lovenox. Continue GI prophylaxis with Protonix IV till able to tolerate PO.    ___________________________________________    Carolina John MD, FACS, FICS  8/9/2019  6:53 AM    Patient was seen with total face to face time of 25 minutes. More than 50% of this visit was counseling and education as above in my assessment and plan section of my note.

## 2019-08-09 NOTE — PROGRESS NOTES
Pt rested in bed throughout shift taking self to bathroom with help of sister. Pt has been receiving dilaudid 1mg every two hours. Vitals stable, tolerating food.

## 2019-08-09 NOTE — PROGRESS NOTES
Progress Note( Dr. Makayla Beaulieu)  8/9/2019  Subjective:   Admit Date: 8/8/2019  PCP: Osiris Spain DO    Admitted For : Left lobe thyroidectomy and also removal of isthamus    Consulted For: Better control of thyroid problem    Interval History: Well able to swallow better voice somewhat hoarse    Denies any chest pains,   Denies SOB . Denies nausea or vomiting. No new bowel or bladder symptoms. Intake/Output Summary (Last 24 hours) at 8/9/2019 0729  Last data filed at 8/9/2019 0504  Gross per 24 hour   Intake 3432.92 ml   Output 2780 ml   Net 652.92 ml       DATA    CBC:   Recent Labs     08/08/19  0630 08/09/19  0441   WBC 8.8 13.4*   HGB 13.7 12.5    321    CMP:  Recent Labs     08/09/19  0441      K 4.9      CO2 25   BUN 5*   CREATININE 0.8   CALCIUM 8.8     Lipids: No results found for: CHOL, HDL, TRIG  Glucose:No results for input(s): POCGLU in the last 72 hours. [id number]:No results found for: LABA1C  High Sensitivity TSH:   Lab Results   Component Value Date    TSHHS 1.580 06/18/2019     Free T3: No results found for: FT3  Free T4:  Lab Results   Component Value Date    T4FREE 1.24 06/18/2019       No results found.     Scheduled Medicines   Medications:    escitalopram  10 mg Oral Daily    levothyroxine  50 mcg Oral Daily    methocarbamol  500 mg Oral 4x Daily    sodium chloride flush  10 mL Intravenous 2 times per day    enoxaparin  40 mg Subcutaneous Daily    sennosides-docusate sodium  2 tablet Oral BID    ondansetron  4 mg Intravenous Q4H    pseudoephedrine  120 mg Oral BID      Infusions:    dextrose 5% and 0.45% NaCl with KCl 20 mEq 125 mL/hr at 08/09/19 0253         Objective:   Vitals: /70   Pulse 78   Temp 97.5 °F (36.4 °C) (Oral)   Resp 17   Ht 5' 4\" (1.626 m)   Wt 283 lb (128.4 kg)   LMP 07/11/2019 (Exact Date)   SpO2 96%   BMI 48.58 kg/m²   General appearance: alert and cooperative with exam  Neck: no JVD or bruit  Thyroid : Scar of thyroidectomy no drainage es   Lungs: Has Vesicular Breath sounds   Heart:  regular rate and rhythm  Abdomen: soft, non-tender; bowel sounds normal; no masses,  no organomegaly  Musculoskeletal: Normal  Extremities: extremities normal, , no edema  Neurologic:  Awake, alert, oriented to name, place and time. Cranial nerves II-XII are grossly intact. Motor is  intact. Sensory is intact. ,  and gait is normal.    Assessment:     Patient Active Problem List:     Follicular neoplasm of thyroid     Post thyroidectomy    Plan:     1. Reviewed POC blood glucose . Labs and X ray results   2. Reviewed Current Medicines   3. Will follow 1 week    .      Anup Christensen MD

## 2019-08-09 NOTE — DISCHARGE INSTR - ACTIVITY
· No strenuous activity  · No lifting greater than 20 pounds  · No driving while taking pain medication

## 2019-08-09 NOTE — PLAN OF CARE
Problem: Safety:  Goal: Free from accidental physical injury  Description  Free from accidental physical injury  Outcome: Met This Shift  Goal: Free from intentional harm  Description  Free from intentional harm  Outcome: Met This Shift     Problem: Daily Care:  Goal: Daily care needs are met  Description  Daily care needs are met  Outcome: Met This Shift     Problem: Infection:  Goal: Will remain free from infection  Description  Will remain free from infection  Outcome: Ongoing     Problem: Pain:  Goal: Patient's pain/discomfort is manageable  Description  Patient's pain/discomfort is manageable  Outcome: Ongoing     Problem: Skin Integrity:  Goal: Skin integrity will stabilize  Description  Skin integrity will stabilize  Outcome: Ongoing     Problem: Discharge Planning:  Goal: Patients continuum of care needs are met  Description  Patients continuum of care needs are met  Outcome: Ongoing     Problem: Physical Regulation:  Goal: Postoperative complications will be avoided or minimized  Description  Postoperative complications will be avoided or minimized  Outcome: Ongoing  Goal: Ability to maintain an adequate cardiac output will improve  Description  Ability to maintain an adequate cardiac output will improve  Outcome: Ongoing  Goal: Ability to remain free from injury will improve  Description  Ability to remain free from injury will improve  Outcome: Ongoing     Problem: Sensory:  Goal: Pain level will decrease  Description  Pain level will decrease  Outcome: Ongoing

## 2019-08-09 NOTE — DISCHARGE SUMMARY
Physician Discharge Summary     Patient ID:  Ron Etienne  [id number]  81 y.o.  1990    Admit date: 8/8/2019    Discharge date: 8/9/2019     Admitting Physician: Dola Galeazzi, MD     Admission Diagnoses: Follicular adenoma of thyroid gland [D34]  Patient Active Problem List   Diagnosis    Follicular neoplasm of thyroid    Follicular adenoma of thyroid gland     Past Medical History:   Diagnosis Date    Depression     Hypothyroidism     Migraines     Last migraine: 7/30/19    PCOS (polycystic ovarian syndrome)     Thyroid disease Dx: 2017    Follows with PCP       Discharge Diagnoses: same    Admission Condition: fair    Discharged Condition: good    Indication for Admission: Left thyroid nodule with follicular pathology, required left thyroidectomy, well tolerated, and being discharged today, final pathology pending. Hospital Course: Patient had an uneventful hospital course after the above mentioned treatment, and was tolerating diet and ambulating without difficulty at the time of discharge. Consults: Endocrinology. Treatments:  IV hydration, antibiotics, analgesia, cardiac meds, anticoagulation, respiratory therapy / incentive spirometry,  and surgery: As above and please refer to daily progress notes for details.     Discharge Exam:  /70   Pulse 78   Temp 97.5 °F (36.4 °C) (Oral)   Resp 17   Ht 5' 4\" (1.626 m)   Wt 283 lb (128.4 kg)   LMP 07/11/2019 (Exact Date)   SpO2 96%   BMI 48.58 kg/m²     General Appearance:    Alert, cooperative, no distress, appears stated age   Head:    Normocephalic, without obvious abnormality, atraumatic   Eyes:    PERRL, conjunctiva/corneas clear, EOM's intact, fundi     benign, both eyes   Ears:    Normal TM's and external ear canals, both ears   Nose:   Nares normal, septum midline, mucosa normal, no drainage    or sinus tenderness   Throat:   Lips, mucosa, and tongue normal; teeth and gums normal Neck:   Supple, symmetrical, trachea midline, no adenopathy;     thyroid:  no enlargement/tenderness/nodules; no carotid    bruit or JVD   Back:     Symmetric, no curvature, ROM normal, no CVA tenderness   Lungs:     Clear to auscultation bilaterally, respirations unlabored   Chest Wall:    No tenderness or deformity    Heart:    Regular rate and rhythm, S1 and S2 normal, no murmur, rub   or gallop   Breast Exam:    No tenderness, masses, or nipple abnormality   Abdomen:     Soft, non-tender, bowel sounds active all four quadrants,     no masses, no organomegaly   Genitalia:    Normal female without lesion, discharge or tenderness   Rectal:    Normal tone ;guaiac negative stool   Extremities:   Extremities normal, atraumatic, no cyanosis or edema   Pulses:   2+ and symmetric all extremities   Skin:   Skin color, texture, turgor normal, no rashes or lesions   Lymph nodes:   Cervical, supraclavicular, and axillary nodes normal   Neurologic:   CNII-XII intact, normal strength, sensation and reflexes     throughout       Discharge vitals:    Wt Readings from Last 3 Encounters:   08/08/19 283 lb (128.4 kg)   07/17/19 283 lb 1.6 oz (128.4 kg)   05/23/19 281 lb 6.4 oz (127.6 kg)   ,  Temp Readings from Last 3 Encounters:   08/09/19 97.5 °F (36.4 °C) (Oral)   08/08/19 95.8 °F (35.4 °C)   04/12/19 98.3 °F (36.8 °C) (Oral)   ,  BP Readings from Last 3 Encounters:   08/09/19 110/70   08/08/19 108/62   07/17/19 127/80   ,  Pulse Readings from Last 3 Encounters:   08/09/19 78   07/17/19 91   05/23/19 118        Disposition: home    Patient Instructions:   Current Discharge Medication List      START taking these medications    Details   senna-docusate (SENOKOT S) 8.6-50 MG per tablet Take 2 tablets by mouth daily for 10 days  Qty: 60 tablet, Refills: 3         CONTINUE these medications which have NOT CHANGED    Details   BLISOVI FE 1/20 1-20 MG-MCG per tablet       escitalopram (LEXAPRO) 10 MG tablet Take 10 mg by mouth daily

## 2019-08-13 ENCOUNTER — OFFICE VISIT (OUTPATIENT)
Dept: INTERNAL MEDICINE CLINIC | Age: 29
End: 2019-08-13
Payer: COMMERCIAL

## 2019-08-13 VITALS
OXYGEN SATURATION: 97 % | WEIGHT: 286.8 LBS | HEART RATE: 89 BPM | BODY MASS INDEX: 48.96 KG/M2 | SYSTOLIC BLOOD PRESSURE: 132 MMHG | DIASTOLIC BLOOD PRESSURE: 84 MMHG | HEIGHT: 64 IN

## 2019-08-13 DIAGNOSIS — G44.52 NPDH (NEW PERSISTENT DAILY HEADACHE): Primary | ICD-10-CM

## 2019-08-13 DIAGNOSIS — D34 FOLLICULAR ADENOMA OF THYROID GLAND: ICD-10-CM

## 2019-08-13 DIAGNOSIS — R19.7 DIARRHEA, UNSPECIFIED TYPE: ICD-10-CM

## 2019-08-13 DIAGNOSIS — E89.0 S/P PARTIAL THYROIDECTOMY: ICD-10-CM

## 2019-08-13 PROCEDURE — 99214 OFFICE O/P EST MOD 30 MIN: CPT | Performed by: FAMILY MEDICINE

## 2019-08-14 ASSESSMENT — ENCOUNTER SYMPTOMS
ABDOMINAL PAIN: 0
BLOOD IN STOOL: 0
NAUSEA: 1
SHORTNESS OF BREATH: 0
EYES NEGATIVE: 1
DIARRHEA: 1
BACK PAIN: 0
WHEEZING: 0
CHEST TIGHTNESS: 0

## 2019-08-14 NOTE — PROGRESS NOTES
normal.   Skin: Skin is warm and dry. Psychiatric: She has a normal mood and affect. Vitals reviewed. ASSESSMENT:  1. NPDH (new persistent daily headache)    2. Follicular adenoma of thyroid gland    3. S/P partial thyroidectomy    4. Diarrhea, unspecified type        PLAN:    Orders Placed This Encounter   Procedures   [address] Zahra Cottrell reports reviewed  Obtain MRI  Await pathology  Continue medications. Pt h as stopped the Senokot due to the diarrhea  Pt will monitor her diarrhea symptoms and if increased or persist will call  Keep f/u with [facility] and other specialists  [facility] to ER       Return if symptoms worsen or fail to improve.     Electronically Signed by Brianna Vance DO

## 2019-08-21 ENCOUNTER — OFFICE VISIT (OUTPATIENT)
Dept: BARIATRICS/WEIGHT MGMT | Age: 29
End: 2019-08-21

## 2019-08-21 ENCOUNTER — HOSPITAL ENCOUNTER (OUTPATIENT)
Dept: MRI IMAGING | Age: 29
Discharge: HOME | End: 2019-08-21
Payer: COMMERCIAL

## 2019-08-21 VITALS
DIASTOLIC BLOOD PRESSURE: 92 MMHG | SYSTOLIC BLOOD PRESSURE: 130 MMHG | HEIGHT: 64 IN | WEIGHT: 285.4 LBS | HEART RATE: 90 BPM | BODY MASS INDEX: 48.73 KG/M2

## 2019-08-21 DIAGNOSIS — C73 FOLLICULAR CARCINOMA (HCC): ICD-10-CM

## 2019-08-21 DIAGNOSIS — G44.52 NPDH (NEW PERSISTENT DAILY HEADACHE): ICD-10-CM

## 2019-08-21 DIAGNOSIS — D49.7 FOLLICULAR NEOPLASM OF THYROID: ICD-10-CM

## 2019-08-21 DIAGNOSIS — D34 FOLLICULAR ADENOMA OF THYROID GLAND: Primary | ICD-10-CM

## 2019-08-21 PROCEDURE — 99024 POSTOP FOLLOW-UP VISIT: CPT | Performed by: SURGERY

## 2019-08-21 PROCEDURE — 70551 MRI BRAIN STEM W/O DYE: CPT

## 2019-08-21 ASSESSMENT — ENCOUNTER SYMPTOMS
VOMITING: 0
WHEEZING: 0
TROUBLE SWALLOWING: 0
VOICE CHANGE: 0
DIARRHEA: 0
SORE THROAT: 0
BLOOD IN STOOL: 0
SHORTNESS OF BREATH: 0
CONSTIPATION: 0
ABDOMINAL PAIN: 0
COLOR CHANGE: 0
NAUSEA: 0
ANAL BLEEDING: 0
COUGH: 0
PHOTOPHOBIA: 0

## 2019-08-21 NOTE — PROGRESS NOTES
patient's medical history, family history, social history and review of systems with the patient today in the office. Please see medical record for pertinent positives. Past Medical History:   Diagnosis Date    Depression     Hypothyroidism     Migraines     Last migraine: 7/30/19    PCOS (polycystic ovarian syndrome)     Thyroid disease Dx: 2017    Follows with PCP      Past Surgical History:   Procedure Laterality Date    DILATION AND CURETTAGE OF UTERUS  08/01/2018    OVARIAN CYST REMOVAL Right 08/01/2018    done with D & C    THYROIDECTOMY Left 8/8/2019    LEFT THYROIDECTOMY performed by Sofia Turner MD at [facility] OR     Current Outpatient Medications   Medication Sig Dispense Refill    BLISOVI FE 1/20 1-20 MG-MCG per tablet       escitalopram (LEXAPRO) 10 MG tablet Take 10 mg by mouth daily      levothyroxine (SYNTHROID) 50 MCG tablet Take 50 mcg by mouth Daily       No current facility-administered medications for this visit. Allergies   Allergen Reactions    Codeine Swelling    Pcn [Penicillins]     Cheese      Gi upset           Review of Systems   Constitutional: Negative for activity change, chills, diaphoresis and fever. HENT: Negative for sore throat, trouble swallowing and voice change. Eyes: Negative for photophobia and visual disturbance. Respiratory: Negative for cough, shortness of breath and wheezing. Cardiovascular: Negative for chest pain, palpitations and leg swelling. Gastrointestinal: Negative for abdominal pain, anal bleeding, blood in stool, constipation, diarrhea, nausea and vomiting. Endocrine: Negative for cold intolerance, heat intolerance, polydipsia and polyuria. Genitourinary: Negative for dysuria, frequency and hematuria. Musculoskeletal: Negative for joint swelling, myalgias and neck stiffness. Skin: Negative for color change and rash. Neurological: Negative for seizures, speech difficulty, light-headedness and numbness.

## 2019-08-23 ENCOUNTER — TELEPHONE (OUTPATIENT)
Dept: BARIATRICS/WEIGHT MGMT | Age: 29
End: 2019-08-23

## 2019-08-23 NOTE — PROGRESS NOTES
Surgery  8/26/19 @ [facility], arrival 1145                   1. Do not eat or drink anything within 8 hours of your surgery- unless instructed by your doctor prior to surgery. This includes                   no water, chewing gum or mints. 2. Follow your directions as prescribed by the doctor for your procedure and medications. 3. Check with your Doctor regarding stopping Plavix, Coumadin, Lovenox,Effient,Pradaxa,Xarelto, Fragmin or other blood thinners and                   follow their instructions. 4. Do not smoke, and do not drink any alcoholic beverages 24 hours prior to surgery. This includes NA Beer. 5. You may brush your teeth and gargle the morning of surgery. DO NOT SWALLOW WATER   6. You MUST make arrangements for a responsible adult to take you home after your surgery and be able to check on you every couple                   hours for the day. You will not be allowed to leave alone or drive yourself home. It is strongly suggested someone stay with you the first 24                   hrs. Your surgery will be cancelled if you do not have a ride home. 7. Please wear simple, loose fitting clothing to the hospital.  Nikolai Catlett not bring valuables (money, credit cards, checkbooks, etc.) Do not wear any                   makeup (including no eye makeup) or nail polish on your fingers or toes. 8. DO NOT wear any jewelry or piercings on day of surgery. All body piercing jewelry must be removed. 9. If you have dentures, they will be removed before going to the OR; we will provide you a container. If you wear contact lenses or glasses,                  they will be removed; please bring a case for them. 10. If you  have a Living Will and Durable Power of  for Healthcare, please bring in a copy. 11. Please bring picture ID,  insurance card, paperwork from the doctors office    (H & P, Consent, & card for implantable devices).            12. Take a shower the night before or morning of your procedure, do not apply any lotion, oil or powder.

## 2019-08-24 ENCOUNTER — ANESTHESIA EVENT (OUTPATIENT)
Dept: OPERATING ROOM | Age: 29
End: 2019-08-24
Payer: COMMERCIAL

## 2019-08-24 NOTE — ANESTHESIA PRE PROCEDURE
Department of Anesthesiology  Preprocedure Note       Name:  Anil Morrison   Age:  29 y.o.  :  1990                                          MRN:  [id number]         Date:  2019      Surgeon: Marnie Sierra):  Dali Richmond MD    Procedure: RIGHT COMPLETION THYROIDECTOMY (Right )    Medications prior to admission:   Prior to Admission medications    Medication Sig Start Date End Date Taking? Authorizing Provider   BLISOVI FE  1-20 MG-MCG per tablet  19   Historical Provider, MD   escitalopram (LEXAPRO) 10 MG tablet Take 10 mg by mouth daily    Historical Provider, MD   levothyroxine (SYNTHROID) 50 MCG tablet Take 50 mcg by mouth Daily    Historical Provider, MD       Current medications:    Current Outpatient Medications   Medication Sig Dispense Refill    BLISOVI FE  1-20 MG-MCG per tablet       escitalopram (LEXAPRO) 10 MG tablet Take 10 mg by mouth daily      levothyroxine (SYNTHROID) 50 MCG tablet Take 50 mcg by mouth Daily       No current facility-administered medications for this visit. Allergies:     Allergies   Allergen Reactions    Codeine Swelling    Pcn [Penicillins]     Cheese      Gi upset       Problem List:    Patient Active Problem List   Diagnosis Code    Follicular neoplasm of thyroid M32.1    Follicular adenoma of thyroid gland C41    Follicular carcinoma (HCC) C73       Past Medical History:        Diagnosis Date    Depression     Takes Lexapro    Hypothyroidism     Migraines     Last migraine: 19    PCOS (polycystic ovarian syndrome)     Takes BC    Thyroid disease Dx: 2017    Follows with PCP       Past Surgical History:        Procedure Laterality Date    DILATION AND CURETTAGE OF UTERUS  2018    OVARIAN CYST REMOVAL Right 2018    done with D & C    THYROIDECTOMY Left 2019    LEFT THYROIDECTOMY performed by Dali Richmond MD at [address] [place] [address] History:    Social History     Tobacco Use    Smoking status: Never Smoker  Smokeless tobacco: Never Used   Substance Use Topics    Alcohol use: Yes     Comment: Rarely - 1 x year                                Counseling given: Not Answered      Vital Signs (Current): There were no vitals filed for this visit. BP Readings from Last 3 Encounters:   08/21/19 (!) 130/92   08/13/19 132/84   08/09/19 137/82       NPO Status:                                                                                 BMI:   Wt Readings from Last 3 Encounters:   08/21/19 285 lb 6.4 oz (129.5 kg)   08/13/19 286 lb 12.8 oz (130.1 kg)   08/08/19 283 lb (128.4 kg)     There is no height or weight on file to calculate BMI.    CBC:   Lab Results   Component Value Date    WBC 13.4 08/09/2019    RBC 4.28 08/09/2019    HGB 12.5 08/09/2019    HCT 39.9 08/09/2019    MCV 93.2 08/09/2019    RDW 13.4 08/09/2019     08/09/2019       CMP:   Lab Results   Component Value Date     08/09/2019    K 4.9 08/09/2019     08/09/2019    CO2 25 08/09/2019    BUN 5 08/09/2019    CREATININE 0.8 08/09/2019    GFRAA >60 08/09/2019    LABGLOM >60 08/09/2019    GLUCOSE 135 08/09/2019    CALCIUM 8.8 08/09/2019       POC Tests: No results for input(s): POCGLU, POCNA, POCK, POCCL, POCBUN, POCHEMO, POCHCT in the last 72 hours.     Coags: No results found for: PROTIME, INR, APTT    HCG (If Applicable):   Lab Results   Component Value Date    PREGTESTUR NEGATIVE 08/08/2019    PREGTESTUR (NOTE)  PERFORMED BY POINT OF CARE METHOD.   08/08/2019        ABGs: No results found for: PHART, PO2ART, FIJ5AKP, YDZ2LBX, BEART, D4QLRRFV     Type & Screen (If Applicable):  No results found for: LYDIA [facility]    Anesthesia Evaluation  Patient summary reviewed and Nursing notes reviewed no history of anesthetic complications:   Airway: Mallampati: II  TM distance: >3 FB   Neck ROM: full  Mouth opening: > = 3 FB Dental: normal exam         Pulmonary:

## 2019-08-26 ENCOUNTER — ANESTHESIA (OUTPATIENT)
Dept: OPERATING ROOM | Age: 29
End: 2019-08-26
Payer: COMMERCIAL

## 2019-08-26 ENCOUNTER — HOSPITAL ENCOUNTER (OUTPATIENT)
Age: 29
Setting detail: OBSERVATION
Discharge: HOME | End: 2019-08-27
Attending: SURGERY | Admitting: SURGERY
Payer: COMMERCIAL

## 2019-08-26 VITALS
DIASTOLIC BLOOD PRESSURE: 84 MMHG | SYSTOLIC BLOOD PRESSURE: 154 MMHG | OXYGEN SATURATION: 86 % | TEMPERATURE: 94.9 F | RESPIRATION RATE: 8 BRPM

## 2019-08-26 PROBLEM — C73: Status: ACTIVE | Noted: 2019-08-26

## 2019-08-26 PROCEDURE — 2500000003 HC RX 250 WO HCPCS: Performed by: SURGERY

## 2019-08-26 PROCEDURE — 6360000002 HC RX W HCPCS: Performed by: NURSE ANESTHETIST, CERTIFIED REGISTERED

## 2019-08-26 PROCEDURE — 6370000000 HC RX 637 (ALT 250 FOR IP): Performed by: NURSE ANESTHETIST, CERTIFIED REGISTERED

## 2019-08-26 PROCEDURE — 7100000001 HC PACU RECOVERY - ADDTL 15 MIN: Performed by: SURGERY

## 2019-08-26 PROCEDURE — 3700000000 HC ANESTHESIA ATTENDED CARE: Performed by: SURGERY

## 2019-08-26 PROCEDURE — 6370000000 HC RX 637 (ALT 250 FOR IP): Performed by: SURGERY

## 2019-08-26 PROCEDURE — 88307 TISSUE EXAM BY PATHOLOGIST: CPT

## 2019-08-26 PROCEDURE — 2709999900 HC NON-CHARGEABLE SUPPLY: Performed by: SURGERY

## 2019-08-26 PROCEDURE — 6370000000 HC RX 637 (ALT 250 FOR IP): Performed by: PHYSICIAN ASSISTANT

## 2019-08-26 PROCEDURE — 60220 PARTIAL REMOVAL OF THYROID: CPT | Performed by: SURGERY

## 2019-08-26 PROCEDURE — 7100000000 HC PACU RECOVERY - FIRST 15 MIN: Performed by: SURGERY

## 2019-08-26 PROCEDURE — G0378 HOSPITAL OBSERVATION PER HR: HCPCS

## 2019-08-26 PROCEDURE — 2500000003 HC RX 250 WO HCPCS: Performed by: NURSE ANESTHETIST, CERTIFIED REGISTERED

## 2019-08-26 PROCEDURE — 3700000001 HC ADD 15 MINUTES (ANESTHESIA): Performed by: SURGERY

## 2019-08-26 PROCEDURE — 2580000003 HC RX 258: Performed by: SURGERY

## 2019-08-26 PROCEDURE — 2580000003 HC RX 258

## 2019-08-26 PROCEDURE — 3600000003 HC SURGERY LEVEL 3 BASE: Performed by: SURGERY

## 2019-08-26 PROCEDURE — 2720000010 HC SURG SUPPLY STERILE: Performed by: SURGERY

## 2019-08-26 PROCEDURE — 6360000002 HC RX W HCPCS: Performed by: SURGERY

## 2019-08-26 PROCEDURE — 3600000013 HC SURGERY LEVEL 3 ADDTL 15MIN: Performed by: SURGERY

## 2019-08-26 RX ORDER — PROPOFOL 10 MG/ML
INJECTION, EMULSION INTRAVENOUS PRN
Status: DISCONTINUED | OUTPATIENT
Start: 2019-08-26 | End: 2019-08-26 | Stop reason: SDUPTHER

## 2019-08-26 RX ORDER — LEVOTHYROXINE SODIUM 0.05 MG/1
50 TABLET ORAL DAILY
Status: DISCONTINUED | OUTPATIENT
Start: 2019-08-26 | End: 2019-08-26

## 2019-08-26 RX ORDER — ONDANSETRON 2 MG/ML
4 INJECTION INTRAMUSCULAR; INTRAVENOUS EVERY 4 HOURS
Status: DISCONTINUED | OUTPATIENT
Start: 2019-08-26 | End: 2019-08-27 | Stop reason: HOSPADM

## 2019-08-26 RX ORDER — DEXAMETHASONE SODIUM PHOSPHATE 4 MG/ML
INJECTION, SOLUTION INTRA-ARTICULAR; INTRALESIONAL; INTRAMUSCULAR; INTRAVENOUS; SOFT TISSUE PRN
Status: DISCONTINUED | OUTPATIENT
Start: 2019-08-26 | End: 2019-08-26 | Stop reason: SDUPTHER

## 2019-08-26 RX ORDER — ESCITALOPRAM OXALATE 10 MG/1
10 TABLET ORAL DAILY
Status: DISCONTINUED | OUTPATIENT
Start: 2019-08-26 | End: 2019-08-27 | Stop reason: HOSPADM

## 2019-08-26 RX ORDER — LIDOCAINE HYDROCHLORIDE 20 MG/ML
INJECTION, SOLUTION INTRAVENOUS PRN
Status: DISCONTINUED | OUTPATIENT
Start: 2019-08-26 | End: 2019-08-26 | Stop reason: SDUPTHER

## 2019-08-26 RX ORDER — POTASSIUM CHLORIDE 7.45 MG/ML
10 INJECTION INTRAVENOUS PRN
Status: DISCONTINUED | OUTPATIENT
Start: 2019-08-26 | End: 2019-08-27 | Stop reason: HOSPADM

## 2019-08-26 RX ORDER — HYDRALAZINE HYDROCHLORIDE 20 MG/ML
5 INJECTION INTRAMUSCULAR; INTRAVENOUS EVERY 10 MIN PRN
Status: DISCONTINUED | OUTPATIENT
Start: 2019-08-26 | End: 2019-08-26 | Stop reason: HOSPADM

## 2019-08-26 RX ORDER — SODIUM CHLORIDE, SODIUM LACTATE, POTASSIUM CHLORIDE, CALCIUM CHLORIDE 600; 310; 30; 20 MG/100ML; MG/100ML; MG/100ML; MG/100ML
INJECTION, SOLUTION INTRAVENOUS
Status: COMPLETED
Start: 2019-08-26 | End: 2019-08-26

## 2019-08-26 RX ORDER — MINERAL OIL AND PETROLATUM 150; 830 MG/G; MG/G
OINTMENT OPHTHALMIC PRN
Status: DISCONTINUED | OUTPATIENT
Start: 2019-08-26 | End: 2019-08-26 | Stop reason: SDUPTHER

## 2019-08-26 RX ORDER — ONDANSETRON 2 MG/ML
4 INJECTION INTRAMUSCULAR; INTRAVENOUS EVERY 4 HOURS PRN
Status: DISCONTINUED | OUTPATIENT
Start: 2019-08-26 | End: 2019-08-27 | Stop reason: HOSPADM

## 2019-08-26 RX ORDER — PROCHLORPERAZINE EDISYLATE 5 MG/ML
10 INJECTION INTRAMUSCULAR; INTRAVENOUS EVERY 6 HOURS PRN
Status: DISCONTINUED | OUTPATIENT
Start: 2019-08-26 | End: 2019-08-26 | Stop reason: SDUPTHER

## 2019-08-26 RX ORDER — SODIUM CHLORIDE 0.9 % (FLUSH) 0.9 %
10 SYRINGE (ML) INJECTION PRN
Status: DISCONTINUED | OUTPATIENT
Start: 2019-08-26 | End: 2019-08-27 | Stop reason: HOSPADM

## 2019-08-26 RX ORDER — KETOROLAC TROMETHAMINE 30 MG/ML
INJECTION, SOLUTION INTRAMUSCULAR; INTRAVENOUS PRN
Status: DISCONTINUED | OUTPATIENT
Start: 2019-08-26 | End: 2019-08-26 | Stop reason: SDUPTHER

## 2019-08-26 RX ORDER — FENTANYL CITRATE 50 UG/ML
25 INJECTION, SOLUTION INTRAMUSCULAR; INTRAVENOUS EVERY 5 MIN PRN
Status: DISCONTINUED | OUTPATIENT
Start: 2019-08-26 | End: 2019-08-26 | Stop reason: HOSPADM

## 2019-08-26 RX ORDER — ACETAMINOPHEN 10 MG/ML
INJECTION, SOLUTION INTRAVENOUS PRN
Status: DISCONTINUED | OUTPATIENT
Start: 2019-08-26 | End: 2019-08-26 | Stop reason: SDUPTHER

## 2019-08-26 RX ORDER — MIDAZOLAM HYDROCHLORIDE 1 MG/ML
INJECTION INTRAMUSCULAR; INTRAVENOUS PRN
Status: DISCONTINUED | OUTPATIENT
Start: 2019-08-26 | End: 2019-08-26 | Stop reason: SDUPTHER

## 2019-08-26 RX ORDER — ROCURONIUM BROMIDE 10 MG/ML
INJECTION, SOLUTION INTRAVENOUS PRN
Status: DISCONTINUED | OUTPATIENT
Start: 2019-08-26 | End: 2019-08-26 | Stop reason: SDUPTHER

## 2019-08-26 RX ORDER — HYDROMORPHONE HCL 110MG/55ML
1 PATIENT CONTROLLED ANALGESIA SYRINGE INTRAVENOUS
Status: DISCONTINUED | OUTPATIENT
Start: 2019-08-26 | End: 2019-08-27 | Stop reason: HOSPADM

## 2019-08-26 RX ORDER — PROMETHAZINE HYDROCHLORIDE 25 MG/ML
12.5 INJECTION, SOLUTION INTRAMUSCULAR; INTRAVENOUS EVERY 6 HOURS PRN
Status: DISCONTINUED | OUTPATIENT
Start: 2019-08-26 | End: 2019-08-27 | Stop reason: HOSPADM

## 2019-08-26 RX ORDER — FENTANYL CITRATE 50 UG/ML
INJECTION, SOLUTION INTRAMUSCULAR; INTRAVENOUS PRN
Status: DISCONTINUED | OUTPATIENT
Start: 2019-08-26 | End: 2019-08-26 | Stop reason: SDUPTHER

## 2019-08-26 RX ORDER — NORETHINDRONE ACETATE AND ETHINYL ESTRADIOL 1MG-20(21)
1 KIT ORAL DAILY
Status: DISCONTINUED | OUTPATIENT
Start: 2019-08-26 | End: 2019-08-27 | Stop reason: HOSPADM

## 2019-08-26 RX ORDER — DEXTROSE, SODIUM CHLORIDE, AND POTASSIUM CHLORIDE 5; .45; .15 G/100ML; G/100ML; G/100ML
INJECTION INTRAVENOUS CONTINUOUS
Status: DISCONTINUED | OUTPATIENT
Start: 2019-08-26 | End: 2019-08-27 | Stop reason: HOSPADM

## 2019-08-26 RX ORDER — PROCHLORPERAZINE EDISYLATE 5 MG/ML
10 INJECTION INTRAMUSCULAR; INTRAVENOUS EVERY 6 HOURS PRN
Status: DISCONTINUED | OUTPATIENT
Start: 2019-08-26 | End: 2019-08-27 | Stop reason: HOSPADM

## 2019-08-26 RX ORDER — SENNA AND DOCUSATE SODIUM 50; 8.6 MG/1; MG/1
2 TABLET, FILM COATED ORAL 2 TIMES DAILY
Status: DISCONTINUED | OUTPATIENT
Start: 2019-08-26 | End: 2019-08-27 | Stop reason: HOSPADM

## 2019-08-26 RX ORDER — ONDANSETRON 2 MG/ML
4 INJECTION INTRAMUSCULAR; INTRAVENOUS
Status: DISCONTINUED | OUTPATIENT
Start: 2019-08-26 | End: 2019-08-26 | Stop reason: HOSPADM

## 2019-08-26 RX ORDER — MAGNESIUM SULFATE 1 G/100ML
1 INJECTION INTRAVENOUS PRN
Status: DISCONTINUED | OUTPATIENT
Start: 2019-08-26 | End: 2019-08-27 | Stop reason: HOSPADM

## 2019-08-26 RX ORDER — SODIUM CHLORIDE, SODIUM LACTATE, POTASSIUM CHLORIDE, CALCIUM CHLORIDE 600; 310; 30; 20 MG/100ML; MG/100ML; MG/100ML; MG/100ML
1000 INJECTION, SOLUTION INTRAVENOUS CONTINUOUS
Status: DISCONTINUED | OUTPATIENT
Start: 2019-08-26 | End: 2019-08-26

## 2019-08-26 RX ORDER — SODIUM CHLORIDE 0.9 % (FLUSH) 0.9 %
10 SYRINGE (ML) INJECTION EVERY 12 HOURS SCHEDULED
Status: DISCONTINUED | OUTPATIENT
Start: 2019-08-26 | End: 2019-08-27 | Stop reason: HOSPADM

## 2019-08-26 RX ORDER — SCOLOPAMINE TRANSDERMAL SYSTEM 1 MG/1
1 PATCH, EXTENDED RELEASE TRANSDERMAL
Status: DISCONTINUED | OUTPATIENT
Start: 2019-08-26 | End: 2019-08-27 | Stop reason: HOSPADM

## 2019-08-26 RX ORDER — HYDROMORPHONE HCL 110MG/55ML
PATIENT CONTROLLED ANALGESIA SYRINGE INTRAVENOUS PRN
Status: DISCONTINUED | OUTPATIENT
Start: 2019-08-26 | End: 2019-08-26 | Stop reason: SDUPTHER

## 2019-08-26 RX ORDER — ONDANSETRON 2 MG/ML
INJECTION INTRAMUSCULAR; INTRAVENOUS PRN
Status: DISCONTINUED | OUTPATIENT
Start: 2019-08-26 | End: 2019-08-26 | Stop reason: SDUPTHER

## 2019-08-26 RX ORDER — OXYCODONE HYDROCHLORIDE AND ACETAMINOPHEN 5; 325 MG/1; MG/1
1 TABLET ORAL EVERY 6 HOURS PRN
Status: DISCONTINUED | OUTPATIENT
Start: 2019-08-26 | End: 2019-08-27 | Stop reason: HOSPADM

## 2019-08-26 RX ADMIN — DEXTROSE MONOHYDRATE 3 G: 50 INJECTION, SOLUTION INTRAVENOUS at 17:39

## 2019-08-26 RX ADMIN — ROCURONIUM BROMIDE 50 MG: 10 INJECTION INTRAVENOUS at 11:04

## 2019-08-26 RX ADMIN — HYDROMORPHONE HYDROCHLORIDE 1 MG: 2 INJECTION, SOLUTION INTRAMUSCULAR; INTRAVENOUS; SUBCUTANEOUS at 23:45

## 2019-08-26 RX ADMIN — ONDANSETRON 4 MG: 2 INJECTION INTRAMUSCULAR; INTRAVENOUS at 16:02

## 2019-08-26 RX ADMIN — KETOROLAC TROMETHAMINE 30 MG: 30 INJECTION, SOLUTION INTRAMUSCULAR; INTRAVENOUS at 12:15

## 2019-08-26 RX ADMIN — SUGAMMADEX 100 MG: 100 INJECTION, SOLUTION INTRAVENOUS at 12:24

## 2019-08-26 RX ADMIN — LIDOCAINE HYDROCHLORIDE 50 MG: 20 INJECTION, SOLUTION INTRAVENOUS at 11:04

## 2019-08-26 RX ADMIN — ACETAMINOPHEN 1000 MG: 10 INJECTION, SOLUTION INTRAVENOUS at 11:12

## 2019-08-26 RX ADMIN — POTASSIUM CHLORIDE, DEXTROSE MONOHYDRATE AND SODIUM CHLORIDE: 150; 5; 450 INJECTION, SOLUTION INTRAVENOUS at 13:12

## 2019-08-26 RX ADMIN — ONDANSETRON 4 MG: 2 INJECTION INTRAMUSCULAR; INTRAVENOUS at 10:54

## 2019-08-26 RX ADMIN — NORETHINDRONE ACETATE AND ETHINYL ESTRADIOL 1 TABLET: KIT at 16:26

## 2019-08-26 RX ADMIN — OXYCODONE HYDROCHLORIDE AND ACETAMINOPHEN 1 TABLET: 5; 325 TABLET ORAL at 20:18

## 2019-08-26 RX ADMIN — DEXTROSE MONOHYDRATE 3 G: 50 INJECTION, SOLUTION INTRAVENOUS at 23:44

## 2019-08-26 RX ADMIN — SODIUM CHLORIDE, POTASSIUM CHLORIDE, SODIUM LACTATE AND CALCIUM CHLORIDE: 600; 310; 30; 20 INJECTION, SOLUTION INTRAVENOUS at 10:56

## 2019-08-26 RX ADMIN — HYDROMORPHONE HYDROCHLORIDE 1 MG: 2 INJECTION, SOLUTION INTRAMUSCULAR; INTRAVENOUS; SUBCUTANEOUS at 15:51

## 2019-08-26 RX ADMIN — SODIUM CHLORIDE, POTASSIUM CHLORIDE, SODIUM LACTATE AND CALCIUM CHLORIDE 1000 ML: 600; 310; 30; 20 INJECTION, SOLUTION INTRAVENOUS at 10:57

## 2019-08-26 RX ADMIN — FENTANYL CITRATE 100 MCG: 50 INJECTION INTRAMUSCULAR; INTRAVENOUS at 11:03

## 2019-08-26 RX ADMIN — HYDROMORPHONE HYDROCHLORIDE 1 MG: 2 INJECTION INTRAMUSCULAR; INTRAVENOUS; SUBCUTANEOUS at 11:27

## 2019-08-26 RX ADMIN — HYDROMORPHONE HYDROCHLORIDE 1 MG: 2 INJECTION, SOLUTION INTRAMUSCULAR; INTRAVENOUS; SUBCUTANEOUS at 17:57

## 2019-08-26 RX ADMIN — ONDANSETRON 4 MG: 2 INJECTION INTRAMUSCULAR; INTRAVENOUS at 21:23

## 2019-08-26 RX ADMIN — SUGAMMADEX 100 MG: 100 INJECTION, SOLUTION INTRAVENOUS at 12:22

## 2019-08-26 RX ADMIN — HYDROMORPHONE HYDROCHLORIDE 1 MG: 2 INJECTION, SOLUTION INTRAMUSCULAR; INTRAVENOUS; SUBCUTANEOUS at 21:23

## 2019-08-26 RX ADMIN — PROPOFOL 150 MG: 10 INJECTION, EMULSION INTRAVENOUS at 11:03

## 2019-08-26 RX ADMIN — POTASSIUM CHLORIDE, DEXTROSE MONOHYDRATE AND SODIUM CHLORIDE: 150; 5; 450 INJECTION, SOLUTION INTRAVENOUS at 21:37

## 2019-08-26 RX ADMIN — WHITE PETROLATUM MINERAL OIL 1 TUBE: 150; 830 OINTMENT OPHTHALMIC at 11:06

## 2019-08-26 RX ADMIN — DEXAMETHASONE SODIUM PHOSPHATE 8 MG: 4 INJECTION, SOLUTION INTRAMUSCULAR; INTRAVENOUS at 11:04

## 2019-08-26 RX ADMIN — Medication 10 ML: at 23:45

## 2019-08-26 RX ADMIN — CEFAZOLIN SODIUM 3 G: 1 INJECTION, POWDER, FOR SOLUTION INTRAMUSCULAR; INTRAVENOUS at 11:00

## 2019-08-26 RX ADMIN — ONDANSETRON 4 MG: 2 INJECTION INTRAMUSCULAR; INTRAVENOUS at 23:44

## 2019-08-26 RX ADMIN — MIDAZOLAM HYDROCHLORIDE 2 MG: 1 INJECTION, SOLUTION INTRAMUSCULAR; INTRAVENOUS at 10:54

## 2019-08-26 RX ADMIN — SODIUM CHLORIDE, POTASSIUM CHLORIDE, SODIUM LACTATE AND CALCIUM CHLORIDE: 600; 310; 30; 20 INJECTION, SOLUTION INTRAVENOUS at 12:21

## 2019-08-26 RX ADMIN — ESCITALOPRAM OXALATE 10 MG: 10 TABLET ORAL at 16:01

## 2019-08-26 RX ADMIN — HYDROMORPHONE HYDROCHLORIDE 1 MG: 2 INJECTION INTRAMUSCULAR; INTRAVENOUS; SUBCUTANEOUS at 12:33

## 2019-08-26 ASSESSMENT — PULMONARY FUNCTION TESTS
PIF_VALUE: 27
PIF_VALUE: 27
PIF_VALUE: 23
PIF_VALUE: 26
PIF_VALUE: 23
PIF_VALUE: 1
PIF_VALUE: 27
PIF_VALUE: 26
PIF_VALUE: 22
PIF_VALUE: 24
PIF_VALUE: 27
PIF_VALUE: 26
PIF_VALUE: 24
PIF_VALUE: 20
PIF_VALUE: 21
PIF_VALUE: 23
PIF_VALUE: 26
PIF_VALUE: 24
PIF_VALUE: 23
PIF_VALUE: 21
PIF_VALUE: 27
PIF_VALUE: 24
PIF_VALUE: 24
PIF_VALUE: 20
PIF_VALUE: 27
PIF_VALUE: 23
PIF_VALUE: 24
PIF_VALUE: 23
PIF_VALUE: 1
PIF_VALUE: 24
PIF_VALUE: 21
PIF_VALUE: 24
PIF_VALUE: 1
PIF_VALUE: 23
PIF_VALUE: 23
PIF_VALUE: 28
PIF_VALUE: 21
PIF_VALUE: 27
PIF_VALUE: 29
PIF_VALUE: 23
PIF_VALUE: 21
PIF_VALUE: 23
PIF_VALUE: 1
PIF_VALUE: 21
PIF_VALUE: 23
PIF_VALUE: 23
PIF_VALUE: 1
PIF_VALUE: 24
PIF_VALUE: 21
PIF_VALUE: 15
PIF_VALUE: 1
PIF_VALUE: 1
PIF_VALUE: 24
PIF_VALUE: 3
PIF_VALUE: 21
PIF_VALUE: 23
PIF_VALUE: 24
PIF_VALUE: 23
PIF_VALUE: 28
PIF_VALUE: 28
PIF_VALUE: 21
PIF_VALUE: 25
PIF_VALUE: 25
PIF_VALUE: 6
PIF_VALUE: 23
PIF_VALUE: 4
PIF_VALUE: 26
PIF_VALUE: 24
PIF_VALUE: 21
PIF_VALUE: 1
PIF_VALUE: 27
PIF_VALUE: 28
PIF_VALUE: 27
PIF_VALUE: 21
PIF_VALUE: 27
PIF_VALUE: 21
PIF_VALUE: 24
PIF_VALUE: 23
PIF_VALUE: 26
PIF_VALUE: 23
PIF_VALUE: 21
PIF_VALUE: 20
PIF_VALUE: 27
PIF_VALUE: 25
PIF_VALUE: 21
PIF_VALUE: 1
PIF_VALUE: 23
PIF_VALUE: 24
PIF_VALUE: 23
PIF_VALUE: 23
PIF_VALUE: 24
PIF_VALUE: 16
PIF_VALUE: 22
PIF_VALUE: 27
PIF_VALUE: 21
PIF_VALUE: 21

## 2019-08-26 ASSESSMENT — PAIN DESCRIPTION - ORIENTATION
ORIENTATION: MID;RIGHT
ORIENTATION: MID;RIGHT
ORIENTATION: RIGHT;MID

## 2019-08-26 ASSESSMENT — PAIN DESCRIPTION - PROGRESSION
CLINICAL_PROGRESSION: NOT CHANGED
CLINICAL_PROGRESSION: GRADUALLY IMPROVING
CLINICAL_PROGRESSION: NOT CHANGED
CLINICAL_PROGRESSION: NOT CHANGED
CLINICAL_PROGRESSION: GRADUALLY IMPROVING
CLINICAL_PROGRESSION: NOT CHANGED

## 2019-08-26 ASSESSMENT — PAIN DESCRIPTION - FREQUENCY
FREQUENCY: CONTINUOUS

## 2019-08-26 ASSESSMENT — PAIN - FUNCTIONAL ASSESSMENT
PAIN_FUNCTIONAL_ASSESSMENT: 0-10
PAIN_FUNCTIONAL_ASSESSMENT: ACTIVITIES ARE NOT PREVENTED
PAIN_FUNCTIONAL_ASSESSMENT: ACTIVITIES ARE NOT PREVENTED
PAIN_FUNCTIONAL_ASSESSMENT: PREVENTS OR INTERFERES SOME ACTIVE ACTIVITIES AND ADLS

## 2019-08-26 ASSESSMENT — PAIN DESCRIPTION - PAIN TYPE
TYPE: SURGICAL PAIN

## 2019-08-26 ASSESSMENT — PAIN DESCRIPTION - LOCATION
LOCATION: NECK

## 2019-08-26 ASSESSMENT — PAIN SCALES - GENERAL
PAINLEVEL_OUTOF10: 8
PAINLEVEL_OUTOF10: 9
PAINLEVEL_OUTOF10: 6
PAINLEVEL_OUTOF10: 0
PAINLEVEL_OUTOF10: 0
PAINLEVEL_OUTOF10: 7
PAINLEVEL_OUTOF10: 9
PAINLEVEL_OUTOF10: 0
PAINLEVEL_OUTOF10: 4
PAINLEVEL_OUTOF10: 6

## 2019-08-26 ASSESSMENT — PAIN DESCRIPTION - ONSET
ONSET: ON-GOING

## 2019-08-26 ASSESSMENT — PAIN DESCRIPTION - DESCRIPTORS
DESCRIPTORS: SPASM;SHARP;STABBING
DESCRIPTORS: SHARP;STABBING
DESCRIPTORS: SHARP;RADIATING
DESCRIPTORS: SHARP;STABBING

## 2019-08-26 NOTE — H&P
Negative for nausea, vomiting, diarrhea, constipation, blood in stool, abdominal distention, anal bleeding or rectal pain. Musculoskeletal: Negative for joint swelling and arthralgias. Skin: Warm and dry, well perfused. Neurological: Negative for seizures, syncope, speech difficulty and weakness. Hematological/Lymphatic: Negative for adenopathy. No history of DVT/PE. Does not bruise/bleed easily. Psychiatric/Behavioral: Negative for agitation. All others reviewed and negative. Physical Exam:  Vital Signs:   Vitals:    08/26/19 1049   BP: (!) 142/94   Pulse: 87   Resp: 16   Temp: 97.4 °F (36.3 °C)   SpO2: 99%     Body mass index is 48.92 kg/m². General appearance: Pt Alert and oriented, to persons place and time, in no apparent acute distress. HEENT:  MONIKA, EOMI, Conjunctivae clear. No JVDs, Bruits, No thyroid-megaly. Lungs: No dyspnea. Clear to auscultation bilaterally. Heart: Regular rate and rhythm, S1, S2 normal, no murmur, rub or gallop. No legs edema. Abdomen: Non tender. Non distended. Positive bowel sounds. No hernias noted, no masses palpable. Extremities: Warm, well perfused, no cyanosis or edema. Skin: Skin color, texture, turgor normal.  Neurologic: Grossly normal, Cranial nerves from II to XII intact. Deep tendon reflexes normal.  Psychology: Mood stable. Lymph nodes: No palpable LN in the neck, abdomen, or groins. Radiologic / Imaging / TESTING  Mri Brain Wo Contrast    Result Date: 8/21/2019  EXAMINATION: MRI OF THE BRAIN WITHOUT CONTRAST  8/21/2019 7:14 am TECHNIQUE: Multiplanar multisequence MRI of the brain was performed without the administration of intravenous contrast. COMPARISON: None.  HISTORY: ORDERING SYSTEM PROVIDED HISTORY: NPDH (new persistent daily headache) TECHNOLOGIST PROVIDED HISTORY: Reason for exam:->new persistent daily headaches Reason for Exam: NKI, constant headache x 2 weeks, nausea, slight dizziness, hx migraines, No Surg FINDINGS:

## 2019-08-26 NOTE — OP NOTE
OPERATIVE / PROCEDURE NOTE    Seth Gomez, 1990, 29 y.o.,  female, CSN: [id number]  August 26, 2019    PREOPERATIVE DIAGNOSIS:  H/o recent thyroid Right lobectomy with follicular carcinoma diagnosis. POSTOPERATIVE DIAGNOSIS:  Same. PROCEDURE DONE:  Completion right thyroidectomy. PATHOLOGY:  Pending. ASSISTANT:  None. ANESTHESIA:  General endotracheal anesthesia, as well as local anesthesia  preoperatively and postoperatively, using 1% Xylocaine with epinephrine and  0.5% Marcaine 50/50 mixture. SPECIMEN:  Right thyroid gland in its entirety. ESTIMATED BLOOD LOSS:  Less than 5 mL. DRAINS:  None. COMPLICATIONS:   None. CONDITION:  Stable, extubated to the PACU with both vocal cords witnessed  after surgery, upon extubation, to be nicely abducting without any problems. OPERATIVE DESCRIPTION:  After properly informed consent was signed by the  patient knowing all the risks, benefits, potential complications, and  possible alternatives of the procedure, having suffered from longstanding  multinodular goiter and a recent diagnosis of follicular carcinoma of her left thyroid, decision was made to proceed with completion thyroidectomy. The patient was brought to the operating room and was placed supine on the  operating room table. After institution of general endotracheal anesthesia  after she had received 3 g of Ancef IV, she was placed supine on the  operating room table with the neck extended with a shoulder rest.  The neck  was prepped and draped in usual sterile fashion. The above-mentioned  anesthetic mixture was used to anesthetize the planned incision site, marked  parallel to the skin crease, 2 fingerbreadths above the sternal notch. An  incision was performed from the mid sternocleidomastoid muscle on the right  to the mid sternocleidomastoid muscle on the left, along the same recent scar.   Dissection was carried  down all the way to the was  injected. The patient tolerated the procedure well without any  complications, was extubated in the OR, and upon extubation, laryngoscopy was  performed and both vocal cords with abducting without any problem. In the  recovery, her voice was normal and her breathing was without any difficulty.     ____________________________________________    Stan Gutiérrez MD, FACS, FICS    8/26/2019  12:42 PM

## 2019-08-27 VITALS
WEIGHT: 285 LBS | HEIGHT: 64 IN | RESPIRATION RATE: 16 BRPM | TEMPERATURE: 97.7 F | SYSTOLIC BLOOD PRESSURE: 135 MMHG | BODY MASS INDEX: 48.65 KG/M2 | HEART RATE: 56 BPM | DIASTOLIC BLOOD PRESSURE: 74 MMHG | OXYGEN SATURATION: 97 %

## 2019-08-27 LAB
ANION GAP SERPL CALCULATED.3IONS-SCNC: 10 MMOL/L (ref 4–16)
ANION GAP SERPL CALCULATED.3IONS-SCNC: 9 MMOL/L (ref 4–16)
BASOPHILS ABSOLUTE: 0 K/CU MM
BASOPHILS RELATIVE PERCENT: 0.1 % (ref 0–1)
BUN BLDV-MCNC: 3 MG/DL (ref 6–23)
BUN BLDV-MCNC: 4 MG/DL (ref 6–23)
CALCIUM IONIZED: 3.76 MG/DL (ref 4.48–5.28)
CALCIUM IONIZED: 4.68 MG/DL (ref 4.48–5.28)
CALCIUM SERPL-MCNC: 8.9 MG/DL (ref 8.3–10.6)
CALCIUM SERPL-MCNC: 8.9 MG/DL (ref 8.3–10.6)
CHLORIDE BLD-SCNC: 105 MMOL/L (ref 99–110)
CHLORIDE BLD-SCNC: 105 MMOL/L (ref 99–110)
CO2: 23 MMOL/L (ref 21–32)
CO2: 26 MMOL/L (ref 21–32)
CREAT SERPL-MCNC: 0.8 MG/DL (ref 0.6–1.1)
CREAT SERPL-MCNC: 0.8 MG/DL (ref 0.6–1.1)
DIFFERENTIAL TYPE: ABNORMAL
EOSINOPHILS ABSOLUTE: 0 K/CU MM
EOSINOPHILS RELATIVE PERCENT: 0 % (ref 0–3)
GFR AFRICAN AMERICAN: >60 ML/MIN/1.73M2
GFR AFRICAN AMERICAN: >60 ML/MIN/1.73M2
GFR NON-AFRICAN AMERICAN: >60 ML/MIN/1.73M2
GFR NON-AFRICAN AMERICAN: >60 ML/MIN/1.73M2
GLUCOSE BLD-MCNC: 112 MG/DL (ref 70–99)
GLUCOSE BLD-MCNC: 143 MG/DL (ref 70–99)
HCT VFR BLD CALC: 40.7 % (ref 37–47)
HEMOGLOBIN: 12.9 GM/DL (ref 12.5–16)
IMMATURE NEUTROPHIL %: 0.6 % (ref 0–0.43)
IONIZED CA: 0.94 MMOL/L (ref 1.12–1.32)
IONIZED CA: 1.17 MMOL/L (ref 1.12–1.32)
LYMPHOCYTES ABSOLUTE: 2.4 K/CU MM
LYMPHOCYTES RELATIVE PERCENT: 16.5 % (ref 24–44)
MCH RBC QN AUTO: 29.1 PG (ref 27–31)
MCHC RBC AUTO-ENTMCNC: 31.7 % (ref 32–36)
MCV RBC AUTO: 91.9 FL (ref 78–100)
MONOCYTES ABSOLUTE: 0.6 K/CU MM
MONOCYTES RELATIVE PERCENT: 4.2 % (ref 0–4)
NUCLEATED RBC %: 0 %
PDW BLD-RTO: 12.9 % (ref 11.7–14.9)
PLATELET # BLD: 313 K/CU MM (ref 140–440)
PMV BLD AUTO: 10.3 FL (ref 7.5–11.1)
POTASSIUM SERPL-SCNC: 4.7 MMOL/L (ref 3.5–5.1)
POTASSIUM SERPL-SCNC: 4.7 MMOL/L (ref 3.5–5.1)
RBC # BLD: 4.43 M/CU MM (ref 4.2–5.4)
SEGMENTED NEUTROPHILS ABSOLUTE COUNT: 11.2 K/CU MM
SEGMENTED NEUTROPHILS RELATIVE PERCENT: 78.6 % (ref 36–66)
SODIUM BLD-SCNC: 138 MMOL/L (ref 135–145)
SODIUM BLD-SCNC: 140 MMOL/L (ref 135–145)
TOTAL IMMATURE NEUTOROPHIL: 0.09 K/CU MM
TOTAL NUCLEATED RBC: 0 K/CU MM
WBC # BLD: 14.3 K/CU MM (ref 4–10.5)

## 2019-08-27 PROCEDURE — 96376 TX/PRO/DX INJ SAME DRUG ADON: CPT

## 2019-08-27 PROCEDURE — 96372 THER/PROPH/DIAG INJ SC/IM: CPT

## 2019-08-27 PROCEDURE — 6370000000 HC RX 637 (ALT 250 FOR IP): Performed by: HOSPITALIST

## 2019-08-27 PROCEDURE — 80048 BASIC METABOLIC PNL TOTAL CA: CPT

## 2019-08-27 PROCEDURE — 82330 ASSAY OF CALCIUM: CPT

## 2019-08-27 PROCEDURE — 2580000003 HC RX 258: Performed by: SURGERY

## 2019-08-27 PROCEDURE — 36415 COLL VENOUS BLD VENIPUNCTURE: CPT

## 2019-08-27 PROCEDURE — 2500000003 HC RX 250 WO HCPCS: Performed by: SURGERY

## 2019-08-27 PROCEDURE — 99024 POSTOP FOLLOW-UP VISIT: CPT | Performed by: SURGERY

## 2019-08-27 PROCEDURE — 85025 COMPLETE CBC W/AUTO DIFF WBC: CPT

## 2019-08-27 PROCEDURE — 6370000000 HC RX 637 (ALT 250 FOR IP): Performed by: PHYSICIAN ASSISTANT

## 2019-08-27 PROCEDURE — 6360000002 HC RX W HCPCS: Performed by: SURGERY

## 2019-08-27 PROCEDURE — 6370000000 HC RX 637 (ALT 250 FOR IP): Performed by: SURGERY

## 2019-08-27 PROCEDURE — G0378 HOSPITAL OBSERVATION PER HR: HCPCS

## 2019-08-27 PROCEDURE — 96361 HYDRATE IV INFUSION ADD-ON: CPT

## 2019-08-27 RX ORDER — SCOLOPAMINE TRANSDERMAL SYSTEM 1 MG/1
1 PATCH, EXTENDED RELEASE TRANSDERMAL
Qty: 5 PATCH | Refills: 2 | Status: SHIPPED | OUTPATIENT
Start: 2019-08-27 | End: 2019-12-06

## 2019-08-27 RX ORDER — CALCIUM CARBONATE 200(500)MG
1000 TABLET,CHEWABLE ORAL 2 TIMES DAILY
Status: DISCONTINUED | OUTPATIENT
Start: 2019-08-27 | End: 2019-08-27 | Stop reason: HOSPADM

## 2019-08-27 RX ORDER — ANTACID TABLETS 648 MG/1
2 TABLET, CHEWABLE ORAL 2 TIMES DAILY
Qty: 120 TABLET | Refills: 3 | Status: SHIPPED | OUTPATIENT
Start: 2019-08-27 | End: 2020-08-26

## 2019-08-27 RX ORDER — DIPHENHYDRAMINE HCL 25 MG
25 TABLET ORAL EVERY 6 HOURS PRN
Status: DISCONTINUED | OUTPATIENT
Start: 2019-08-27 | End: 2019-08-27 | Stop reason: HOSPADM

## 2019-08-27 RX ADMIN — HYDROMORPHONE HYDROCHLORIDE 1 MG: 2 INJECTION, SOLUTION INTRAMUSCULAR; INTRAVENOUS; SUBCUTANEOUS at 11:33

## 2019-08-27 RX ADMIN — HYDROMORPHONE HYDROCHLORIDE 1 MG: 2 INJECTION, SOLUTION INTRAMUSCULAR; INTRAVENOUS; SUBCUTANEOUS at 03:33

## 2019-08-27 RX ADMIN — CALCIUM CARBONATE 1000 MG: 500 TABLET, CHEWABLE ORAL at 12:18

## 2019-08-27 RX ADMIN — CALCIUM CARBONATE 1000 MG: 500 TABLET, CHEWABLE ORAL at 16:02

## 2019-08-27 RX ADMIN — Medication 10 ML: at 05:45

## 2019-08-27 RX ADMIN — POTASSIUM CHLORIDE, DEXTROSE MONOHYDRATE AND SODIUM CHLORIDE: 150; 5; 450 INJECTION, SOLUTION INTRAVENOUS at 05:45

## 2019-08-27 RX ADMIN — ONDANSETRON 4 MG: 2 INJECTION INTRAMUSCULAR; INTRAVENOUS at 03:32

## 2019-08-27 RX ADMIN — ONDANSETRON 4 MG: 2 INJECTION INTRAMUSCULAR; INTRAVENOUS at 11:47

## 2019-08-27 RX ADMIN — OXYCODONE HYDROCHLORIDE AND ACETAMINOPHEN 1 TABLET: 5; 325 TABLET ORAL at 16:02

## 2019-08-27 RX ADMIN — HYDROMORPHONE HYDROCHLORIDE 1 MG: 2 INJECTION, SOLUTION INTRAMUSCULAR; INTRAVENOUS; SUBCUTANEOUS at 05:44

## 2019-08-27 RX ADMIN — HYDROMORPHONE HYDROCHLORIDE 1 MG: 2 INJECTION, SOLUTION INTRAMUSCULAR; INTRAVENOUS; SUBCUTANEOUS at 07:56

## 2019-08-27 RX ADMIN — ESCITALOPRAM OXALATE 10 MG: 10 TABLET ORAL at 09:49

## 2019-08-27 RX ADMIN — ENOXAPARIN SODIUM 40 MG: 40 INJECTION SUBCUTANEOUS at 09:50

## 2019-08-27 RX ADMIN — OXYCODONE HYDROCHLORIDE AND ACETAMINOPHEN 1 TABLET: 5; 325 TABLET ORAL at 09:50

## 2019-08-27 RX ADMIN — HYDROMORPHONE HYDROCHLORIDE 1 MG: 2 INJECTION, SOLUTION INTRAMUSCULAR; INTRAVENOUS; SUBCUTANEOUS at 13:44

## 2019-08-27 RX ADMIN — BENZOCAINE, MENTHOL 1 LOZENGE: 15; 3.6 LOZENGE ORAL at 05:45

## 2019-08-27 RX ADMIN — OXYCODONE HYDROCHLORIDE AND ACETAMINOPHEN 1 TABLET: 5; 325 TABLET ORAL at 02:23

## 2019-08-27 RX ADMIN — ONDANSETRON 4 MG: 2 INJECTION INTRAMUSCULAR; INTRAVENOUS at 08:05

## 2019-08-27 RX ADMIN — DIPHENHYDRAMINE HCL 25 MG: 25 TABLET ORAL at 09:50

## 2019-08-27 ASSESSMENT — PAIN DESCRIPTION - FREQUENCY
FREQUENCY: CONTINUOUS

## 2019-08-27 ASSESSMENT — PAIN DESCRIPTION - DESCRIPTORS
DESCRIPTORS: SHARP;STABBING
DESCRIPTORS: SHARP
DESCRIPTORS: SHARP;STABBING
DESCRIPTORS: DISCOMFORT
DESCRIPTORS: SHARP;STABBING
DESCRIPTORS: SHARP;SORE
DESCRIPTORS: SHARP;STABBING

## 2019-08-27 ASSESSMENT — PAIN DESCRIPTION - PAIN TYPE
TYPE: SURGICAL PAIN

## 2019-08-27 ASSESSMENT — PAIN DESCRIPTION - PROGRESSION
CLINICAL_PROGRESSION: NOT CHANGED
CLINICAL_PROGRESSION: GRADUALLY WORSENING
CLINICAL_PROGRESSION: NOT CHANGED
CLINICAL_PROGRESSION: NOT CHANGED
CLINICAL_PROGRESSION: GRADUALLY IMPROVING
CLINICAL_PROGRESSION: GRADUALLY WORSENING
CLINICAL_PROGRESSION: NOT CHANGED
CLINICAL_PROGRESSION: NOT CHANGED

## 2019-08-27 ASSESSMENT — PAIN - FUNCTIONAL ASSESSMENT
PAIN_FUNCTIONAL_ASSESSMENT: ACTIVITIES ARE NOT PREVENTED

## 2019-08-27 ASSESSMENT — PAIN DESCRIPTION - ONSET
ONSET: ON-GOING

## 2019-08-27 ASSESSMENT — PAIN DESCRIPTION - LOCATION
LOCATION: NECK
LOCATION: KNEE

## 2019-08-27 ASSESSMENT — PAIN SCALES - GENERAL
PAINLEVEL_OUTOF10: 7
PAINLEVEL_OUTOF10: 8
PAINLEVEL_OUTOF10: 8
PAINLEVEL_OUTOF10: 9
PAINLEVEL_OUTOF10: 8
PAINLEVEL_OUTOF10: 7
PAINLEVEL_OUTOF10: 7
PAINLEVEL_OUTOF10: 9

## 2019-08-27 ASSESSMENT — PAIN DESCRIPTION - ORIENTATION
ORIENTATION: RIGHT;MID
ORIENTATION: RIGHT

## 2019-08-27 NOTE — CONSULTS
intact Fundoscopic Exam not performed   ENT:Normal  NECK:  Supple, No JVD. Thyroid Exam: Scar of total thyroidectomy  LUNGS:  Has Vesicular Breath Sounds,   CARDIOVASCULAR:  Normal apical impulse, regular rate and rhythm, normal S1 and S2, no S3 or S4, and has no  murmur   ABDOMEN:  No scars, normal bowel sounds, soft, non-distended, non-tender, no masses palpated, no hepatolienomegaly  Musculoskeletal: Normal  Extremities: Normal, peripheral pulses normal, , has no edema   NEUROLOGIC:  Awake, alert, oriented to name, place and time. Cranial nerves II-XII are grossly intact. Motor is  intact. Sensory is intact. ,  and gait is normal.    DATA:    CBC: No results for input(s): WBC, HGB, PLT in the last 72 hours. CMP:No results for input(s): NA, K, CL, CO2, BUN, CREATININE, CALCIUM, PROT, LABALBU, BILITOT, ALKPHOS, AST, ALT in the last 72 hours. Invalid input(s): GLU  Lipids: No results found for: CHOL, HDL, TRIG  Glucose: No results for input(s): POCGLU in the last 72 hours. Hemoglobin A1C: No results found for: LABA1C  Free T4:   Lab Results   Component Value Date    T4FREE 1.24 06/18/2019     Free T3: No results found for: FT3  TSH High Sensitivity:   Lab Results   Component Value Date    TSHHS 1.580 06/18/2019       Mri Brain Wo Contrast    Result Date: 8/21/2019  EXAMINATION: MRI OF THE BRAIN WITHOUT CONTRAST  8/21/2019 7:14 am   ensity appears normal. The soft tissues demonstrate no acute abnormality.      Unremarkable exam       Scheduled Medicines   Medications:    escitalopram  10 mg Oral Daily    sodium chloride flush  10 mL Intravenous 2 times per day    ceFAZolin (ANCEF) IVPB  3 g Intravenous Q8H    [START ON 8/27/2019] enoxaparin  40 mg Subcutaneous Daily    scopolamine  1 patch Transdermal Q72H    sennosides-docusate sodium  2 tablet Oral BID    ondansetron  4 mg Intravenous Q4H    norethindrone-ethinyl estradiol  1 tablet Oral Daily      Infusions:    dextrose 5% and 0.45% NaCl with KCl

## 2019-08-27 NOTE — DISCHARGE SUMMARY
Physician Discharge Summary     Patient ID:  Silvino Burton  [id number]  36 y.o.  1990    Admit date: 8/26/2019    Discharge date: 8/27/2019     Admitting Physician: Cheyenne Maicel MD     Admission Diagnoses: Primary thyroid follicular carcinoma [facility]) [C73]  Patient Active Problem List   Diagnosis    Follicular neoplasm of thyroid    Follicular adenoma of thyroid gland    [facility]lic[facility] [address]    Primary thyroid follicular carcinoma ([facility] [address]     Past Medical History:   Diagnosis Date    Depression     Takes Lexapro    Hypothyroidism     Migraines     Last migraine: 8/11/19    PCOS (polycystic ovarian syndrome)     Takes BC    Thyroid disease Dx: 2017    Follows with PCP       Discharge Diagnoses: same    Admission Condition: fair    Discharged Condition: good    Indication for Admission: FOLLICULAR CA OF THE THYROID, NEEDING COMPLETION RIGHT THYROIDECTOMY, AND DOING VERY WELL. Hospital Course: Patient had an uneventful hospital course after the above mentioned treatment, and was tolerating diet and ambulating without difficulty at the time of discharge. Consults: ENDOSCRINOLOGIST, AND HOSPITALIST    Treatments:  IV hydration, antibiotics, analgesia, cardiac meds, anticoagulation, respiratory therapy / incentive spirometry,  and surgery: As above and please refer to daily progress notes for details.     Discharge Exam:  /74   Pulse 56   Temp 97.7 °F (36.5 °C) (Oral)   Resp 16   Ht 5' 4\" (1.626 m)   Wt 285 lb (129.3 kg)   LMP 08/08/2019 (Exact Date)   SpO2 97%   BMI 48.92 kg/m²     General Appearance:    Alert, cooperative, no distress, appears stated age   Head:    Normocephalic, without obvious abnormality, atraumatic   Eyes:    PERRL, conjunctiva/corneas clear, EOM's intact, fundi     benign, both eyes   Ears:    Normal TM's and external ear canals, both ears   Nose:   Nares normal, septum midline, mucosa normal, no drainage    or sinus

## 2019-09-03 ENCOUNTER — TELEPHONE (OUTPATIENT)
Dept: BARIATRICS/WEIGHT MGMT | Age: 29
End: 2019-09-03

## 2019-09-06 ENCOUNTER — OFFICE VISIT (OUTPATIENT)
Dept: BARIATRICS/WEIGHT MGMT | Age: 29
End: 2019-09-06

## 2019-09-06 VITALS
HEIGHT: 64 IN | SYSTOLIC BLOOD PRESSURE: 126 MMHG | BODY MASS INDEX: 48.04 KG/M2 | WEIGHT: 281.4 LBS | HEART RATE: 97 BPM | DIASTOLIC BLOOD PRESSURE: 74 MMHG

## 2019-09-06 DIAGNOSIS — C73 PRIMARY THYROID FOLLICULAR CARCINOMA (HCC): Primary | ICD-10-CM

## 2019-09-06 PROCEDURE — 99024 POSTOP FOLLOW-UP VISIT: CPT | Performed by: SURGERY

## 2019-09-06 NOTE — PROGRESS NOTES
Allergen Reactions    Codeine Swelling    Pcn [Penicillins]     Cheese      Gi upset           Review of Systems      OBJECTIVE:    /74 (Site: Left Lower Arm, Position: Sitting, Cuff Size: Medium Adult)   Pulse 97   Ht 5' 4\" (1.626 m)   Wt 281 lb 6.4 oz (127.6 kg)   LMP 08/08/2019 (Exact Date)   BMI 48.30 kg/m²    Body mass index is 48.3 kg/m². Physical Exam        ASSESSMENT & PLAN:    1. Primary thyroid follicular carcinoma (HCC)         Needs the radioactive Iodine by Dr Argenis Osborn, he is taking care of it. Patient counseled on the risks, benefits, and alternatives of treatment plan at length while in the office today. Patient states an understanding and willingness to proceed with the plan. Follow Up:  Return in about 3 months (around 12/6/2019) for Follow up Symptoms. Ernestina Potts MD, FACS, FICS.    9/6/19       Patient was seen with total face to face time of 25 minutes. More than 50% of this visit was counseling and education as above in my assessment and plan.

## 2019-09-16 ENCOUNTER — HOSPITAL ENCOUNTER (OUTPATIENT)
Dept: PHYSICAL THERAPY | Age: 29
Setting detail: THERAPIES SERIES
Discharge: HOME | End: 2019-09-16
Payer: OTHER MISCELLANEOUS

## 2019-09-16 PROCEDURE — 97530 THERAPEUTIC ACTIVITIES: CPT | Performed by: PHYSICAL THERAPIST

## 2019-09-16 PROCEDURE — 97110 THERAPEUTIC EXERCISES: CPT | Performed by: PHYSICAL THERAPIST

## 2019-09-16 PROCEDURE — 97161 PT EVAL LOW COMPLEX 20 MIN: CPT | Performed by: PHYSICAL THERAPIST

## 2019-09-16 ASSESSMENT — PAIN DESCRIPTION - ORIENTATION: ORIENTATION: RIGHT

## 2019-09-16 ASSESSMENT — PAIN DESCRIPTION - PROGRESSION: CLINICAL_PROGRESSION: NOT CHANGED

## 2019-09-16 ASSESSMENT — PAIN SCALES - GENERAL: PAINLEVEL_OUTOF10: 3

## 2019-09-16 ASSESSMENT — PAIN DESCRIPTION - FREQUENCY: FREQUENCY: CONTINUOUS

## 2019-09-16 ASSESSMENT — PAIN DESCRIPTION - PAIN TYPE: TYPE: ACUTE PAIN

## 2019-09-16 ASSESSMENT — PAIN DESCRIPTION - ONSET: ONSET: AWAKENED FROM SLEEP

## 2019-09-16 ASSESSMENT — PAIN DESCRIPTION - LOCATION: LOCATION: FOOT

## 2019-09-16 ASSESSMENT — PAIN - FUNCTIONAL ASSESSMENT: PAIN_FUNCTIONAL_ASSESSMENT: PREVENTS OR INTERFERES WITH MANY ACTIVE NOT PASSIVE ACTIVITIES
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
## 2019-09-23 ENCOUNTER — HOSPITAL ENCOUNTER (OUTPATIENT)
Dept: PHYSICAL THERAPY | Age: 29
Setting detail: THERAPIES SERIES
Discharge: HOME | End: 2019-09-23
Payer: OTHER MISCELLANEOUS

## 2019-09-23 PROCEDURE — 97110 THERAPEUTIC EXERCISES: CPT
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
## 2019-09-26 ENCOUNTER — HOSPITAL ENCOUNTER (OUTPATIENT)
Dept: PHYSICAL THERAPY | Age: 29
Setting detail: THERAPIES SERIES
Discharge: HOME | End: 2019-09-26
Payer: OTHER MISCELLANEOUS

## 2019-09-26 PROCEDURE — 97110 THERAPEUTIC EXERCISES: CPT

## 2019-09-30 ENCOUNTER — HOSPITAL ENCOUNTER (OUTPATIENT)
Dept: PHYSICAL THERAPY | Age: 29
Setting detail: THERAPIES SERIES
Discharge: HOME | End: 2019-09-30
Payer: OTHER MISCELLANEOUS

## 2019-09-30 PROCEDURE — 97140 MANUAL THERAPY 1/> REGIONS: CPT

## 2019-09-30 PROCEDURE — 97110 THERAPEUTIC EXERCISES: CPT

## 2019-10-02 ENCOUNTER — HOSPITAL ENCOUNTER (OUTPATIENT)
Dept: NUCLEAR MEDICINE | Age: 29
Discharge: HOME | End: 2019-10-02
Payer: COMMERCIAL

## 2019-10-02 DIAGNOSIS — C73 THYROID CANCER (HCC): ICD-10-CM

## 2019-10-02 PROCEDURE — 78018 THYROID MET IMAGING BODY: CPT

## 2019-10-02 PROCEDURE — 3430000000 HC RX DIAGNOSTIC RADIOPHARMACEUTICAL: Performed by: INTERNAL MEDICINE

## 2019-10-02 PROCEDURE — A9509 IODINE I-123 SOD IODIDE MIL: HCPCS | Performed by: INTERNAL MEDICINE

## 2019-10-02 RX ADMIN — Medication 2.2 MILLICURIE: at 09:55

## 2019-10-03 ENCOUNTER — HOSPITAL ENCOUNTER (OUTPATIENT)
Dept: PHYSICAL THERAPY | Age: 29
Setting detail: THERAPIES SERIES
Discharge: HOME | End: 2019-10-03
Payer: COMMERCIAL

## 2019-10-03 ENCOUNTER — HOSPITAL ENCOUNTER (OUTPATIENT)
Dept: NUCLEAR MEDICINE | Age: 29
Discharge: HOME | End: 2019-10-03
Payer: COMMERCIAL

## 2019-10-03 PROCEDURE — 97110 THERAPEUTIC EXERCISES: CPT

## 2019-10-03 PROCEDURE — 97140 MANUAL THERAPY 1/> REGIONS: CPT

## 2019-10-07 ENCOUNTER — HOSPITAL ENCOUNTER (OUTPATIENT)
Dept: PHYSICAL THERAPY | Age: 29
Setting detail: THERAPIES SERIES
Discharge: HOME | End: 2019-10-07
Payer: COMMERCIAL

## 2019-10-07 PROCEDURE — 97110 THERAPEUTIC EXERCISES: CPT

## 2019-10-07 PROCEDURE — 97140 MANUAL THERAPY 1/> REGIONS: CPT

## 2019-10-11 ENCOUNTER — HOSPITAL ENCOUNTER (OUTPATIENT)
Dept: PHYSICAL THERAPY | Age: 29
Setting detail: THERAPIES SERIES
Discharge: HOME | End: 2019-10-11
Payer: COMMERCIAL

## 2019-10-11 PROCEDURE — 97110 THERAPEUTIC EXERCISES: CPT | Performed by: PHYSICAL THERAPIST

## 2019-10-11 PROCEDURE — 97140 MANUAL THERAPY 1/> REGIONS: CPT | Performed by: PHYSICAL THERAPIST

## 2019-10-14 ENCOUNTER — HOSPITAL ENCOUNTER (OUTPATIENT)
Dept: PHYSICAL THERAPY | Age: 29
Setting detail: THERAPIES SERIES
Discharge: HOME | End: 2019-10-14
Payer: COMMERCIAL

## 2019-10-14 PROCEDURE — 97110 THERAPEUTIC EXERCISES: CPT

## 2019-10-14 PROCEDURE — 97140 MANUAL THERAPY 1/> REGIONS: CPT

## 2019-10-17 ENCOUNTER — HOSPITAL ENCOUNTER (OUTPATIENT)
Dept: PHYSICAL THERAPY | Age: 29
Setting detail: THERAPIES SERIES
Discharge: HOME | End: 2019-10-17
Payer: COMMERCIAL

## 2019-10-17 PROCEDURE — 97140 MANUAL THERAPY 1/> REGIONS: CPT

## 2019-10-17 PROCEDURE — 97110 THERAPEUTIC EXERCISES: CPT

## 2019-10-18 ENCOUNTER — HOSPITAL ENCOUNTER (OUTPATIENT)
Dept: CT IMAGING | Age: 29
Discharge: HOME | End: 2019-10-18
Payer: COMMERCIAL

## 2019-10-18 DIAGNOSIS — E04.2 MULTINODULAR GOITER (NONTOXIC): ICD-10-CM

## 2019-10-18 DIAGNOSIS — C73 MALIGNANT NEOPLASM OF THYROID GLAND (HCC): ICD-10-CM

## 2019-10-18 PROCEDURE — 71250 CT THORAX DX C-: CPT

## 2019-10-25 ENCOUNTER — HOSPITAL ENCOUNTER (OUTPATIENT)
Age: 29
Discharge: HOME | End: 2019-10-25
Payer: COMMERCIAL

## 2019-10-25 ENCOUNTER — HOSPITAL ENCOUNTER (OUTPATIENT)
Dept: NUCLEAR MEDICINE | Age: 29
Discharge: HOME | End: 2019-10-25
Payer: COMMERCIAL

## 2019-10-25 DIAGNOSIS — C73 THYROID CANCER (HCC): ICD-10-CM

## 2019-10-25 LAB
GONADOTROPIN, CHORIONIC (HCG) QUANT: NORMAL UIU/ML

## 2019-10-25 PROCEDURE — A9517 I131 IODIDE CAP, RX: HCPCS | Performed by: INTERNAL MEDICINE

## 2019-10-25 PROCEDURE — 79005 NUCLEAR RX ORAL ADMIN: CPT

## 2019-10-25 PROCEDURE — 84702 CHORIONIC GONADOTROPIN TEST: CPT

## 2019-10-25 PROCEDURE — 36415 COLL VENOUS BLD VENIPUNCTURE: CPT

## 2019-10-25 PROCEDURE — 3430000000 HC RX DIAGNOSTIC RADIOPHARMACEUTICAL: Performed by: INTERNAL MEDICINE

## 2019-10-25 RX ADMIN — SODIUM IODIDE I 131 31.9 MILLICURIE: 1 CAPSULE, GELATIN COATED ORAL at 13:25

## 2019-10-30 ENCOUNTER — HOSPITAL ENCOUNTER (OUTPATIENT)
Dept: NUCLEAR MEDICINE | Age: 29
Discharge: HOME | End: 2019-10-30
Payer: COMMERCIAL

## 2019-10-30 ENCOUNTER — APPOINTMENT (OUTPATIENT)
Dept: CT IMAGING | Age: 29
End: 2019-10-30
Payer: COMMERCIAL

## 2019-10-30 DIAGNOSIS — C73 THYROID CANCER (HCC): ICD-10-CM

## 2019-10-30 PROCEDURE — 78018 THYROID MET IMAGING BODY: CPT

## 2019-10-30 PROCEDURE — 3430000000 HC RX DIAGNOSTIC RADIOPHARMACEUTICAL: Performed by: INTERNAL MEDICINE

## 2019-10-30 PROCEDURE — A9517 I131 IODIDE CAP, RX: HCPCS | Performed by: INTERNAL MEDICINE

## 2019-10-31 ENCOUNTER — HOSPITAL ENCOUNTER (OUTPATIENT)
Dept: PHYSICAL THERAPY | Age: 29
Setting detail: THERAPIES SERIES
Discharge: HOME | End: 2019-10-31
Payer: COMMERCIAL

## 2019-10-31 PROCEDURE — 97140 MANUAL THERAPY 1/> REGIONS: CPT | Performed by: PHYSICAL THERAPIST

## 2019-10-31 PROCEDURE — 97110 THERAPEUTIC EXERCISES: CPT | Performed by: PHYSICAL THERAPIST

## 2019-11-12 ENCOUNTER — HOSPITAL ENCOUNTER (OUTPATIENT)
Dept: MRI IMAGING | Age: 29
Discharge: HOME | End: 2019-11-12
Payer: COMMERCIAL

## 2019-11-12 DIAGNOSIS — M77.41 METATARSALGIA OF RIGHT FOOT: ICD-10-CM

## 2019-11-12 PROCEDURE — 73720 MRI LWR EXTREMITY W/O&W/DYE: CPT

## 2019-11-12 PROCEDURE — A9579 GAD-BASE MR CONTRAST NOS,1ML: HCPCS | Performed by: PODIATRIST

## 2019-11-12 PROCEDURE — 6360000004 HC RX CONTRAST MEDICATION: Performed by: PODIATRIST

## 2019-11-12 RX ADMIN — GADOTERIDOL 20 ML: 279.3 INJECTION, SOLUTION INTRAVENOUS at 19:18

## 2019-11-22 ENCOUNTER — HOSPITAL ENCOUNTER (OUTPATIENT)
Dept: CT IMAGING | Age: 29
Discharge: HOME | End: 2019-11-22
Payer: COMMERCIAL

## 2019-11-22 DIAGNOSIS — C73 MALIGNANT NEOPLASM OF THYROID GLAND (HCC): ICD-10-CM

## 2019-11-22 PROCEDURE — 6360000004 HC RX CONTRAST MEDICATION: Performed by: INTERNAL MEDICINE

## 2019-11-22 PROCEDURE — 71260 CT THORAX DX C+: CPT

## 2019-11-22 RX ADMIN — IOPAMIDOL 75 ML: 755 INJECTION, SOLUTION INTRAVENOUS at 08:44

## 2019-12-06 ENCOUNTER — OFFICE VISIT (OUTPATIENT)
Dept: BARIATRICS/WEIGHT MGMT | Age: 29
End: 2019-12-06
Payer: COMMERCIAL

## 2019-12-06 VITALS
HEART RATE: 80 BPM | DIASTOLIC BLOOD PRESSURE: 78 MMHG | WEIGHT: 289.5 LBS | BODY MASS INDEX: 49.42 KG/M2 | SYSTOLIC BLOOD PRESSURE: 112 MMHG | HEIGHT: 64 IN

## 2019-12-06 DIAGNOSIS — C73 PRIMARY THYROID FOLLICULAR CARCINOMA (HCC): Primary | ICD-10-CM

## 2019-12-06 PROCEDURE — 99214 OFFICE O/P EST MOD 30 MIN: CPT | Performed by: SURGERY

## 2019-12-06 RX ORDER — LEVOTHYROXINE SODIUM 0.12 MG/1
150 TABLET ORAL
COMMUNITY
Start: 2019-10-10 | End: 2019-12-23

## 2019-12-06 ASSESSMENT — ENCOUNTER SYMPTOMS
DIARRHEA: 0
VOICE CHANGE: 0
NAUSEA: 0
COLOR CHANGE: 0
SORE THROAT: 0
VOMITING: 0
SHORTNESS OF BREATH: 0
BLOOD IN STOOL: 0
TROUBLE SWALLOWING: 0
ANAL BLEEDING: 0
COUGH: 0
ABDOMINAL PAIN: 0
WHEEZING: 0
CONSTIPATION: 0
PHOTOPHOBIA: 0

## 2019-12-16 ENCOUNTER — HOSPITAL ENCOUNTER (OUTPATIENT)
Dept: PHYSICAL THERAPY | Age: 29
Setting detail: THERAPIES SERIES
Discharge: HOME | End: 2019-12-16
Payer: COMMERCIAL

## 2019-12-19 ENCOUNTER — OFFICE VISIT (OUTPATIENT)
Dept: PHYSICAL MEDICINE AND REHAB | Age: 29
End: 2019-12-19
Payer: COMMERCIAL

## 2019-12-19 DIAGNOSIS — R20.2 PARESTHESIA OF BOTH FEET: ICD-10-CM

## 2019-12-19 DIAGNOSIS — G89.29 CHRONIC BILATERAL LOW BACK PAIN WITHOUT SCIATICA: ICD-10-CM

## 2019-12-19 DIAGNOSIS — M79.671 CHRONIC PAIN IN RIGHT FOOT: Primary | ICD-10-CM

## 2019-12-19 DIAGNOSIS — M54.50 CHRONIC BILATERAL LOW BACK PAIN WITHOUT SCIATICA: ICD-10-CM

## 2019-12-19 DIAGNOSIS — G89.29 CHRONIC PAIN IN RIGHT FOOT: Primary | ICD-10-CM

## 2019-12-19 PROCEDURE — 95886 MUSC TEST DONE W/N TEST COMP: CPT | Performed by: PHYSICAL MEDICINE & REHABILITATION

## 2019-12-19 PROCEDURE — 95910 NRV CNDJ TEST 7-8 STUDIES: CPT | Performed by: PHYSICAL MEDICINE & REHABILITATION

## 2019-12-23 ENCOUNTER — HOSPITAL ENCOUNTER (OUTPATIENT)
Age: 29
Discharge: HOME | End: 2019-12-23
Payer: COMMERCIAL

## 2019-12-23 ENCOUNTER — HOSPITAL ENCOUNTER (OUTPATIENT)
Dept: GENERAL RADIOLOGY | Age: 29
Discharge: HOME | End: 2019-12-23
Payer: COMMERCIAL

## 2019-12-23 ENCOUNTER — OFFICE VISIT (OUTPATIENT)
Dept: INTERNAL MEDICINE CLINIC | Age: 29
End: 2019-12-23
Payer: COMMERCIAL

## 2019-12-23 VITALS
SYSTOLIC BLOOD PRESSURE: 119 MMHG | WEIGHT: 287.6 LBS | OXYGEN SATURATION: 99 % | HEIGHT: 64 IN | HEART RATE: 103 BPM | DIASTOLIC BLOOD PRESSURE: 70 MMHG | BODY MASS INDEX: 49.1 KG/M2

## 2019-12-23 DIAGNOSIS — R11.0 CHRONIC NAUSEA: ICD-10-CM

## 2019-12-23 DIAGNOSIS — M79.10 MUSCLE PAIN: ICD-10-CM

## 2019-12-23 DIAGNOSIS — G89.29 CHRONIC LEFT SHOULDER PAIN: ICD-10-CM

## 2019-12-23 DIAGNOSIS — R25.2 MUSCLE CRAMPS: ICD-10-CM

## 2019-12-23 DIAGNOSIS — G89.29 CHRONIC LEFT SHOULDER PAIN: Primary | ICD-10-CM

## 2019-12-23 DIAGNOSIS — M25.512 CHRONIC LEFT SHOULDER PAIN: ICD-10-CM

## 2019-12-23 DIAGNOSIS — M25.512 CHRONIC LEFT SHOULDER PAIN: Primary | ICD-10-CM

## 2019-12-23 DIAGNOSIS — E89.0 S/P THYROIDECTOMY: ICD-10-CM

## 2019-12-23 LAB
ALBUMIN SERPL-MCNC: 3.8 GM/DL (ref 3.4–5)
ALP BLD-CCNC: 80 IU/L (ref 40–128)
ALT SERPL-CCNC: 11 U/L (ref 10–40)
ANION GAP SERPL CALCULATED.3IONS-SCNC: 10 MMOL/L (ref 4–16)
AST SERPL-CCNC: 15 IU/L (ref 15–37)
BACTERIA: NEGATIVE /HPF
BASOPHILS ABSOLUTE: 0.1 K/CU MM
BASOPHILS RELATIVE PERCENT: 0.9 % (ref 0–1)
BILIRUB SERPL-MCNC: 0.2 MG/DL (ref 0–1)
BILIRUBIN URINE: NEGATIVE MG/DL
BLOOD, URINE: ABNORMAL
BUN BLDV-MCNC: 9 MG/DL (ref 6–23)
CALCIUM OXALATE CRYSTALS: ABNORMAL /HPF
CALCIUM SERPL-MCNC: 8.7 MG/DL (ref 8.3–10.6)
CHLORIDE BLD-SCNC: 103 MMOL/L (ref 99–110)
CLARITY: ABNORMAL
CO2: 24 MMOL/L (ref 21–32)
COLOR: YELLOW
CREAT SERPL-MCNC: 0.9 MG/DL (ref 0.6–1.1)
DIFFERENTIAL TYPE: ABNORMAL
EOSINOPHILS ABSOLUTE: 0.2 K/CU MM
EOSINOPHILS RELATIVE PERCENT: 2.2 % (ref 0–3)
GFR AFRICAN AMERICAN: >60 ML/MIN/1.73M2
GFR NON-AFRICAN AMERICAN: >60 ML/MIN/1.73M2
GLUCOSE BLD-MCNC: 85 MG/DL (ref 70–99)
GLUCOSE, URINE: NEGATIVE MG/DL
HCT VFR BLD CALC: 44.3 % (ref 37–47)
HEMOGLOBIN: 14 GM/DL (ref 12.5–16)
IMMATURE NEUTROPHIL %: 0.3 % (ref 0–0.43)
KETONES, URINE: NEGATIVE MG/DL
LEUKOCYTE ESTERASE, URINE: NEGATIVE
LYMPHOCYTES ABSOLUTE: 2 K/CU MM
LYMPHOCYTES RELATIVE PERCENT: 29.8 % (ref 24–44)
MCH RBC QN AUTO: 30.2 PG (ref 27–31)
MCHC RBC AUTO-ENTMCNC: 31.6 % (ref 32–36)
MCV RBC AUTO: 95.5 FL (ref 78–100)
MONOCYTES ABSOLUTE: 0.4 K/CU MM
MONOCYTES RELATIVE PERCENT: 6.1 % (ref 0–4)
MUCUS: ABNORMAL HPF
NITRITE URINE, QUANTITATIVE: NEGATIVE
NUCLEATED RBC %: 0 %
PDW BLD-RTO: 12.8 % (ref 11.7–14.9)
PH, URINE: 5 (ref 5–8)
PLATELET # BLD: 299 K/CU MM (ref 140–440)
PMV BLD AUTO: 10.2 FL (ref 7.5–11.1)
POTASSIUM SERPL-SCNC: 4.4 MMOL/L (ref 3.5–5.1)
PROTEIN UA: NEGATIVE MG/DL
RBC # BLD: 4.64 M/CU MM (ref 4.2–5.4)
RBC URINE: 401 /HPF (ref 0–6)
SEGMENTED NEUTROPHILS ABSOLUTE COUNT: 4.1 K/CU MM
SEGMENTED NEUTROPHILS RELATIVE PERCENT: 60.7 % (ref 36–66)
SODIUM BLD-SCNC: 137 MMOL/L (ref 135–145)
SPECIFIC GRAVITY UA: 1.02 (ref 1–1.03)
SQUAMOUS EPITHELIAL: 5 /HPF
TOTAL IMMATURE NEUTOROPHIL: 0.02 K/CU MM
TOTAL NUCLEATED RBC: 0 K/CU MM
TOTAL PROTEIN: 6.7 GM/DL (ref 6.4–8.2)
TRICHOMONAS: ABNORMAL /HPF
UROBILINOGEN, URINE: NORMAL MG/DL (ref 0.2–1)
WBC # BLD: 6.7 K/CU MM (ref 4–10.5)
WBC UA: 2 /HPF (ref 0–5)
YEAST: ABNORMAL /HPF

## 2019-12-23 PROCEDURE — 99213 OFFICE O/P EST LOW 20 MIN: CPT | Performed by: FAMILY MEDICINE

## 2019-12-23 PROCEDURE — 80053 COMPREHEN METABOLIC PANEL: CPT

## 2019-12-23 PROCEDURE — 85025 COMPLETE CBC W/AUTO DIFF WBC: CPT

## 2019-12-23 PROCEDURE — 73030 X-RAY EXAM OF SHOULDER: CPT

## 2019-12-23 PROCEDURE — 81001 URINALYSIS AUTO W/SCOPE: CPT

## 2019-12-23 PROCEDURE — 36415 COLL VENOUS BLD VENIPUNCTURE: CPT

## 2019-12-23 RX ORDER — CYCLOBENZAPRINE HCL 10 MG
10 TABLET ORAL DAILY PRN
COMMUNITY
End: 2020-02-28

## 2019-12-23 RX ORDER — LEVOTHYROXINE SODIUM 0.15 MG/1
175 TABLET ORAL DAILY
COMMUNITY
End: 2020-02-28

## 2019-12-29 ASSESSMENT — ENCOUNTER SYMPTOMS
CHEST TIGHTNESS: 0
NAUSEA: 1
CONSTIPATION: 0
DIARRHEA: 0
BLOOD IN STOOL: 0
SHORTNESS OF BREATH: 0
COUGH: 0
ABDOMINAL PAIN: 0
BACK PAIN: 0

## 2019-12-31 DIAGNOSIS — R10.9 INTERMITTENT ABDOMINAL PAIN: ICD-10-CM

## 2019-12-31 DIAGNOSIS — R11.0 CHRONIC NAUSEA: Primary | ICD-10-CM

## 2020-01-07 ENCOUNTER — HOSPITAL ENCOUNTER (OUTPATIENT)
Dept: ULTRASOUND IMAGING | Age: 30
Discharge: HOME | End: 2020-01-07
Payer: COMMERCIAL

## 2020-01-07 PROCEDURE — 76705 ECHO EXAM OF ABDOMEN: CPT

## 2020-01-30 ENCOUNTER — HOSPITAL ENCOUNTER (OUTPATIENT)
Age: 30
Discharge: HOME | End: 2020-01-30
Payer: COMMERCIAL

## 2020-01-30 LAB
AMYLASE: 37 U/L (ref 25–115)
LIPASE: 24 IU/L (ref 13–60)

## 2020-01-30 PROCEDURE — 36415 COLL VENOUS BLD VENIPUNCTURE: CPT

## 2020-01-30 PROCEDURE — 83516 IMMUNOASSAY NONANTIBODY: CPT

## 2020-01-30 PROCEDURE — 83690 ASSAY OF LIPASE: CPT

## 2020-01-30 PROCEDURE — 82150 ASSAY OF AMYLASE: CPT

## 2020-01-30 PROCEDURE — 83013 H PYLORI (C-13) BREATH: CPT

## 2020-02-01 LAB
H PYLORI BREATH TEST: NEGATIVE
H PYLORI BREATH TEST: NORMAL

## 2020-02-03 LAB
IMMUNOGLOBULIN A, SERUM: 9 UNITS (ref 0–19)
IMMUNOGLOBULIN A, SERUM: NORMAL UNITS (ref 0–19)

## 2020-02-28 ENCOUNTER — OFFICE VISIT (OUTPATIENT)
Dept: INTERNAL MEDICINE CLINIC | Age: 30
End: 2020-02-28
Payer: COMMERCIAL

## 2020-02-28 VITALS
SYSTOLIC BLOOD PRESSURE: 122 MMHG | HEART RATE: 86 BPM | DIASTOLIC BLOOD PRESSURE: 80 MMHG | BODY MASS INDEX: 50.02 KG/M2 | OXYGEN SATURATION: 97 % | WEIGHT: 293 LBS | HEIGHT: 64 IN

## 2020-02-28 PROCEDURE — 99213 OFFICE O/P EST LOW 20 MIN: CPT | Performed by: FAMILY MEDICINE

## 2020-02-28 PROCEDURE — G8431 POS CLIN DEPRES SCRN F/U DOC: HCPCS | Performed by: FAMILY MEDICINE

## 2020-02-28 PROCEDURE — G0444 DEPRESSION SCREEN ANNUAL: HCPCS | Performed by: FAMILY MEDICINE

## 2020-02-28 RX ORDER — NORETHINDRONE ACETATE AND ETHINYL ESTRADIOL 1MG-20(21)
1 KIT ORAL DAILY
Qty: 1 PACKET | Refills: 5 | Status: SHIPPED | OUTPATIENT
Start: 2020-02-28 | End: 2020-09-18 | Stop reason: SDUPTHER

## 2020-02-28 RX ORDER — PANTOPRAZOLE SODIUM 40 MG/1
40 TABLET, DELAYED RELEASE ORAL NIGHTLY
COMMUNITY

## 2020-02-28 RX ORDER — MELOXICAM 15 MG/1
15 TABLET ORAL DAILY
COMMUNITY
End: 2020-07-02 | Stop reason: ALTCHOICE

## 2020-02-28 RX ORDER — ESCITALOPRAM OXALATE 10 MG/1
10 TABLET ORAL DAILY
Qty: 30 TABLET | Refills: 2 | Status: SHIPPED | OUTPATIENT
Start: 2020-02-28 | End: 2020-07-02 | Stop reason: SDUPTHER

## 2020-02-28 RX ORDER — LEVOTHYROXINE SODIUM 175 UG/1
175 TABLET ORAL DAILY
COMMUNITY
End: 2020-07-02 | Stop reason: ALTCHOICE

## 2020-02-28 RX ORDER — PANTOPRAZOLE SODIUM 40 MG/1
40 GRANULE, DELAYED RELEASE ORAL NIGHTLY
COMMUNITY
End: 2020-02-28

## 2020-02-28 ASSESSMENT — PATIENT HEALTH QUESTIONNAIRE - PHQ9
7. TROUBLE CONCENTRATING ON THINGS, SUCH AS READING THE NEWSPAPER OR WATCHING TELEVISION: 2
8. MOVING OR SPEAKING SO SLOWLY THAT OTHER PEOPLE COULD HAVE NOTICED. OR THE OPPOSITE, BEING SO FIGETY OR RESTLESS THAT YOU HAVE BEEN MOVING AROUND A LOT MORE THAN USUAL: 2
3. TROUBLE FALLING OR STAYING ASLEEP: 3
SUM OF ALL RESPONSES TO PHQ QUESTIONS 1-9: 21
10. IF YOU CHECKED OFF ANY PROBLEMS, HOW DIFFICULT HAVE THESE PROBLEMS MADE IT FOR YOU TO DO YOUR WORK, TAKE CARE OF THINGS AT HOME, OR GET ALONG WITH OTHER PEOPLE: 1
SUM OF ALL RESPONSES TO PHQ9 QUESTIONS 1 & 2: 6
6. FEELING BAD ABOUT YOURSELF - OR THAT YOU ARE A FAILURE OR HAVE LET YOURSELF OR YOUR FAMILY DOWN: 2
5. POOR APPETITE OR OVEREATING: 3
2. FEELING DOWN, DEPRESSED OR HOPELESS: 3
4. FEELING TIRED OR HAVING LITTLE ENERGY: 3
1. LITTLE INTEREST OR PLEASURE IN DOING THINGS: 3
9. THOUGHTS THAT YOU WOULD BE BETTER OFF DEAD, OR OF HURTING YOURSELF: 0
SUM OF ALL RESPONSES TO PHQ QUESTIONS 1-9: 21

## 2020-02-28 ASSESSMENT — COLUMBIA-SUICIDE SEVERITY RATING SCALE - C-SSRS
6. HAVE YOU EVER DONE ANYTHING, STARTED TO DO ANYTHING, OR PREPARED TO DO ANYTHING TO END YOUR LIFE?: NO
2. HAVE YOU ACTUALLY HAD ANY THOUGHTS OF KILLING YOURSELF?: NO
1. WITHIN THE PAST MONTH, HAVE YOU WISHED YOU WERE DEAD OR WISHED YOU COULD GO TO SLEEP AND NOT WAKE UP?: NO

## 2020-02-28 NOTE — PROGRESS NOTES
Isidro Strickland  1990  34 y.o.  female    Chief Complaint   Patient presents with    Medication Refill         History of Present Illness  Isidro Strickland is a 34 y.o. female who presents today for a check up. Pt states she is scheduled for a EGD later today at Dr. Daniella Huerta office. She is on Protonix now. +Depression- She has been on Lexapro started by her GYN. She is unable to return to their office. She continues to have some depression. No SI or plan  +PCOS-On OCP's and stable  -Hx of thyroid cancer and hypothyroidism-managed by Dr. Vega Hindu  -Chronic R foot pain possibly due to a nerve. Pt is managed by Dr Kristin Arias and will have cortisone injections    Review of Systems   Constitutional: Negative for diaphoresis and fever. Respiratory: Negative for cough, chest tightness and shortness of breath. Cardiovascular: Negative for chest pain and palpitations. Gastrointestinal: Positive for nausea (chronic). Negative for abdominal pain, blood in stool, constipation and diarrhea. Genitourinary: Negative for difficulty urinating. Musculoskeletal: Negative for back pain. Chronic R foot pain   Neurological: Negative for dizziness, light-headedness and headaches. Psychiatric/Behavioral: Positive for dysphoric mood. Negative for suicidal ideas.        Allergies   Allergen Reactions    Codeine Swelling    Pcn [Penicillins]     Cheese      Gi upset       Past Medical History:   Diagnosis Date    Chronic foot pain, right     Depression     Takes Lexapro    Follicular thyroid cancer ([facility] [address]     Surgery    Hypothyroidism     managed per Dr. Rosalind Enriquez Migraines     Last migraine: 8/11/19    PCOS (polycystic ovarian syndrome)     Takes [facility]       Past Surgical History:   Procedure Laterality Date    DILATION AND CURETTAGE OF UTERUS  08/01/2018    OVARIAN CYST REMOVAL Right 08/01/2018    done with D & C    THYROIDECTOMY Left 8/8/2019    LEFT THYROIDECTOMY performed by Darin Mckeon MD at [place] [address] Status: She is alert and oriented to person, place, and time. Cranial Nerves: No cranial nerve deficit. Psychiatric:         Mood and Affect: Mood normal.         ASSESSMENT:  1. Moderate episode of recurrent major depressive disorder ([facility] [address]    2. Positive depression screening    3. PCOS (polycystic ovarian syndrome)        PLAN:  Orders Placed This Encounter   Procedures    Positive Screen for Clinical Depression with a Documented Follow-up Plan        Orders Placed This Encounter   Medications    escitalopram (LEXAPRO) 10 MG tablet     Sig: Take 1 tablet by mouth daily     Dispense:  30 tablet     Refill:  2     Hold on file    BLISOVI FE 1/20 1-20 MG-MCG per tablet     Sig: Take 1 tablet by mouth daily     Dispense:  1 packet     Refill:  5     Hold on file   Continue medications  ADR's explained  The patient is asked to make an attempt to improve diet and exercise patterns to aid in medical management of this problem. Consider counseling  Keep f/u for EGD procedure today  Keep f/u with podiatrist, Endo  and other specialists       Return in about 4 months (around 6/28/2020) for Depression. Electronically Signed by Joe Esparza DO        On the basis of positive PHQ-9 screening (PHQ-9 Total Score: 21), the following plan was implemented: medication prescribed: Lexapro- 20 mg- patient will call for any significant medication side effects or worsening symptoms of depression. Patient will follow-up in 4 month(s) with PCP.

## 2020-02-29 ASSESSMENT — ENCOUNTER SYMPTOMS
SHORTNESS OF BREATH: 0
NAUSEA: 1
BACK PAIN: 0
COUGH: 0
CONSTIPATION: 0
BLOOD IN STOOL: 0
ABDOMINAL PAIN: 0
DIARRHEA: 0
CHEST TIGHTNESS: 0

## 2020-04-03 ENCOUNTER — HOSPITAL ENCOUNTER (OUTPATIENT)
Dept: NUCLEAR MEDICINE | Age: 30
Discharge: HOME | End: 2020-04-03
Payer: COMMERCIAL

## 2020-04-03 VITALS — HEIGHT: 64 IN | BODY MASS INDEX: 50.02 KG/M2 | WEIGHT: 293 LBS

## 2020-04-03 PROCEDURE — 78227 HEPATOBIL SYST IMAGE W/DRUG: CPT

## 2020-04-03 PROCEDURE — 3430000000 HC RX DIAGNOSTIC RADIOPHARMACEUTICAL: Performed by: INTERNAL MEDICINE

## 2020-04-03 PROCEDURE — 6360000002 HC RX W HCPCS: Performed by: INTERNAL MEDICINE

## 2020-04-03 PROCEDURE — A9537 TC99M MEBROFENIN: HCPCS | Performed by: INTERNAL MEDICINE

## 2020-04-03 PROCEDURE — 2580000003 HC RX 258: Performed by: INTERNAL MEDICINE

## 2020-04-03 RX ADMIN — SODIUM CHLORIDE 2.67 MCG: 9 INJECTION, SOLUTION INTRAVENOUS at 12:20

## 2020-04-03 RX ADMIN — Medication 4.9 MILLICURIE: at 11:20

## 2020-04-12 ENCOUNTER — PATIENT MESSAGE (OUTPATIENT)
Dept: INTERNAL MEDICINE CLINIC | Age: 30
End: 2020-04-12

## 2020-04-13 ENCOUNTER — TELEPHONE (OUTPATIENT)
Dept: INTERNAL MEDICINE CLINIC | Age: 30
End: 2020-04-13

## 2020-04-13 NOTE — TELEPHONE ENCOUNTER
Spoke to pt. Apparently she was treated by urgent care in the fall for the same. She understands that there could be other causes of her symptoms. If worse or persist she will need to be seen as discussed.  She wants to try Nystatin

## 2020-05-07 ENCOUNTER — OFFICE VISIT (OUTPATIENT)
Dept: INTERNAL MEDICINE CLINIC | Age: 30
End: 2020-05-07
Payer: COMMERCIAL

## 2020-05-07 VITALS
SYSTOLIC BLOOD PRESSURE: 118 MMHG | OXYGEN SATURATION: 99 % | HEART RATE: 96 BPM | BODY MASS INDEX: 50.02 KG/M2 | HEIGHT: 64 IN | RESPIRATION RATE: 20 BRPM | TEMPERATURE: 98.4 F | DIASTOLIC BLOOD PRESSURE: 71 MMHG | WEIGHT: 293 LBS

## 2020-05-07 PROCEDURE — 99214 OFFICE O/P EST MOD 30 MIN: CPT | Performed by: NURSE PRACTITIONER

## 2020-05-07 RX ORDER — LIDOCAINE HYDROCHLORIDE 20 MG/ML
15 SOLUTION OROPHARYNGEAL PRN
Qty: 100 ML | Refills: 0 | Status: SHIPPED | OUTPATIENT
Start: 2020-05-07 | End: 2020-07-02

## 2020-05-07 RX ORDER — FLUCONAZOLE 100 MG/1
100 TABLET ORAL DAILY
Qty: 3 TABLET | Refills: 0 | Status: SHIPPED | OUTPATIENT
Start: 2020-05-07 | End: 2020-05-10

## 2020-05-07 RX ORDER — DOXYCYCLINE HYCLATE 100 MG
100 TABLET ORAL 2 TIMES DAILY
Qty: 14 TABLET | Refills: 0 | Status: SHIPPED | OUTPATIENT
Start: 2020-05-07 | End: 2020-05-14

## 2020-05-07 ASSESSMENT — ENCOUNTER SYMPTOMS
SINUS PRESSURE: 0
ABDOMINAL PAIN: 0
SORE THROAT: 1
SINUS PAIN: 0
VOMITING: 0
SHORTNESS OF BREATH: 0
NAUSEA: 0
COUGH: 0
APNEA: 0
DIARRHEA: 0
COLOR CHANGE: 0
CHEST TIGHTNESS: 0

## 2020-05-07 NOTE — PROGRESS NOTES
Wanda Benton  1990 05/07/20    Chief Complaint   Patient presents with   Salud Rasher     pt reports oral discharge, tongue and mouth pain sx started 3 weeks ago pt was called in magic mouth wash by dr Vessie Habermann but pt reports sx persist     Otalgia     left ear pain sx started 3 weeks ago pt was seen at urgent care and given zpak and fluconazole. Then called after sx didnt resolve and was given cefdinir        SUBJECTIVE:      Mrs Raiza Dickerson is a current patient of Dr Richi Barrow who presents to the office this morning for c/o ongoing concerns of oral thrush, as well as left sided otalgia. In brief, the patient has a history of thyroid cancer and recently started radiation therapy. Ever since then she noticed white patchy and painful/burning areas on her tongue and mouth. She was given Nystatin by her PCP which was ineffective, and has since also been on 2 rounds of Diflucan which was minimally effective at the time. She states today that her pain is much better and rash seems decreased. Coincidentally, she was also dx with left sided AOM at urgent care and given Azithromycin, then Cefdinir. Her left sided otalgia has failed to improve. Review of Systems   Constitutional: Negative for activity change, appetite change, fatigue and fever. HENT: Positive for ear pain and sore throat. Negative for congestion, nosebleeds, sinus pressure and sinus pain. Tongue rash/pain   Respiratory: Negative for apnea, cough, chest tightness and shortness of breath. Cardiovascular: Negative for chest pain and palpitations. Gastrointestinal: Negative for abdominal pain, diarrhea, nausea and vomiting. Genitourinary: Negative for difficulty urinating, flank pain and hematuria. Musculoskeletal: Negative for arthralgias, joint swelling and myalgias. Skin: Positive for rash. Negative for color change. Neurological: Negative for dizziness, light-headedness and headaches. Psychiatric/Behavioral: Negative.   Negative for behavioral problems. OBJECTIVE:    /71   Pulse 96   Temp 98.4 °F (36.9 °C)   Resp 20   Ht 5' 4\" (1.626 m)   Wt 299 lb (135.6 kg)   SpO2 99%   BMI 51.32 kg/m²     Physical Exam  Constitutional:       General: She is not in acute distress. Appearance: She is well-developed. She is not diaphoretic. HENT:      Head: Normocephalic and atraumatic. Left Ear: Tympanic membrane is erythematous and bulging. Tympanic membrane is not perforated. Nose: Nose normal.      Mouth/Throat:      Mouth: Mucous membranes are moist.      Pharynx: Oropharynx is clear. No oropharyngeal exudate or posterior oropharyngeal erythema. Neck:      Musculoskeletal: Normal range of motion and neck supple. No edema, erythema or muscular tenderness. Cardiovascular:      Rate and Rhythm: Normal rate. Pulmonary:      Effort: Pulmonary effort is normal. No respiratory distress. Breath sounds: Normal breath sounds. Abdominal:      General: Bowel sounds are normal.      Palpations: Abdomen is soft. Tenderness: There is no abdominal tenderness. Musculoskeletal: Normal range of motion. Skin:     General: Skin is warm and dry. Capillary Refill: Capillary refill takes less than 2 seconds. Neurological:      Mental Status: She is alert and oriented to person, place, and time. Coordination: Coordination normal.         ASSESSMENT:    1. Recurrent otitis media, left    2. Oral thrush        PLAN:    Lisa Boyer was seen today for thrush and otalgia. Diagnoses and all orders for this visit:    Recurrent otitis media, left- exam today is c/w unresolved AOM. Since has failed azith/cefdinir, pt will start Doxy as below. If no improvement, consider ENT eval after completion. -     doxycycline hyclate (VIBRA-TABS) 100 MG tablet; Take 1 tablet by mouth 2 times daily for 7 days    Oral thrush- very minimal and patient does feel has improved over last week.  Will extend one additional taper of Fluconazole as well as viscous lido for pain. RTO in 1 week if no improvement. -     fluconazole (DIFLUCAN) 100 MG tablet; Take 1 tablet by mouth daily for 3 days TAKE TWO TABLETS BY MOUTH ON DAY ONE. THEN TAKE ONE TABLET PER DAY ON DAYS 2 & 3.  -     lidocaine viscous hcl (XYLOCAINE) 2 % SOLN solution; Take 15 mLs by mouth as needed for Pain (oral pain). Course of treatment, including any medications, possible imaging, referrals, and follow ups discussed with patient. All risks and benefits and possible side effects discussed with patient who agrees to plan of care and verbalizes understanding. All labs and imaging reviewed. No flowsheet data found. Return if symptoms worsen or fail to improve.

## 2020-07-02 ENCOUNTER — HOSPITAL ENCOUNTER (OUTPATIENT)
Age: 30
Discharge: HOME | End: 2020-07-02
Payer: COMMERCIAL

## 2020-07-02 ENCOUNTER — OFFICE VISIT (OUTPATIENT)
Dept: INTERNAL MEDICINE CLINIC | Age: 30
End: 2020-07-02
Payer: COMMERCIAL

## 2020-07-02 VITALS
TEMPERATURE: 97.3 F | HEART RATE: 95 BPM | WEIGHT: 293 LBS | OXYGEN SATURATION: 97 % | BODY MASS INDEX: 50.02 KG/M2 | SYSTOLIC BLOOD PRESSURE: 120 MMHG | HEIGHT: 64 IN | DIASTOLIC BLOOD PRESSURE: 88 MMHG

## 2020-07-02 LAB
ALBUMIN SERPL-MCNC: 3.7 GM/DL (ref 3.4–5)
ALP BLD-CCNC: 82 IU/L (ref 40–128)
ALT SERPL-CCNC: 11 U/L (ref 10–40)
ANION GAP SERPL CALCULATED.3IONS-SCNC: 10 MMOL/L (ref 4–16)
AST SERPL-CCNC: 15 IU/L (ref 15–37)
BASOPHILS ABSOLUTE: 0 K/CU MM
BASOPHILS RELATIVE PERCENT: 0.5 % (ref 0–1)
BILIRUB SERPL-MCNC: 0.3 MG/DL (ref 0–1)
BUN BLDV-MCNC: 9 MG/DL (ref 6–23)
CALCIUM SERPL-MCNC: 9.2 MG/DL (ref 8.3–10.6)
CHLORIDE BLD-SCNC: 104 MMOL/L (ref 99–110)
CO2: 25 MMOL/L (ref 21–32)
CREAT SERPL-MCNC: 0.9 MG/DL (ref 0.6–1.1)
DIFFERENTIAL TYPE: ABNORMAL
EOSINOPHILS ABSOLUTE: 0.1 K/CU MM
EOSINOPHILS RELATIVE PERCENT: 1.9 % (ref 0–3)
GFR AFRICAN AMERICAN: >60 ML/MIN/1.73M2
GFR NON-AFRICAN AMERICAN: >60 ML/MIN/1.73M2
GLUCOSE BLD-MCNC: 108 MG/DL (ref 70–99)
HCT VFR BLD CALC: 43 % (ref 37–47)
HEMOGLOBIN: 13.9 GM/DL (ref 12.5–16)
IMMATURE NEUTROPHIL %: 0.3 % (ref 0–0.43)
LYMPHOCYTES ABSOLUTE: 2.1 K/CU MM
LYMPHOCYTES RELATIVE PERCENT: 28.8 % (ref 24–44)
MCH RBC QN AUTO: 29.4 PG (ref 27–31)
MCHC RBC AUTO-ENTMCNC: 32.3 % (ref 32–36)
MCV RBC AUTO: 91.1 FL (ref 78–100)
MONOCYTES ABSOLUTE: 0.5 K/CU MM
MONOCYTES RELATIVE PERCENT: 6.5 % (ref 0–4)
NUCLEATED RBC %: 0 %
PDW BLD-RTO: 12.6 % (ref 11.7–14.9)
PLATELET # BLD: 332 K/CU MM (ref 140–440)
PMV BLD AUTO: 10.4 FL (ref 7.5–11.1)
POTASSIUM SERPL-SCNC: 4.1 MMOL/L (ref 3.5–5.1)
RBC # BLD: 4.72 M/CU MM (ref 4.2–5.4)
SEGMENTED NEUTROPHILS ABSOLUTE COUNT: 4.6 K/CU MM
SEGMENTED NEUTROPHILS RELATIVE PERCENT: 62 % (ref 36–66)
SODIUM BLD-SCNC: 139 MMOL/L (ref 135–145)
T4 FREE: 1.52 NG/DL (ref 0.9–1.8)
TOTAL IMMATURE NEUTOROPHIL: 0.02 K/CU MM
TOTAL NUCLEATED RBC: 0 K/CU MM
TOTAL PROTEIN: 6.7 GM/DL (ref 6.4–8.2)
TSH HIGH SENSITIVITY: 0.34 UIU/ML (ref 0.27–4.2)
WBC # BLD: 7.4 K/CU MM (ref 4–10.5)

## 2020-07-02 PROCEDURE — 80053 COMPREHEN METABOLIC PANEL: CPT

## 2020-07-02 PROCEDURE — 84439 ASSAY OF FREE THYROXINE: CPT

## 2020-07-02 PROCEDURE — 84443 ASSAY THYROID STIM HORMONE: CPT

## 2020-07-02 PROCEDURE — 99243 OFF/OP CNSLTJ NEW/EST LOW 30: CPT | Performed by: FAMILY MEDICINE

## 2020-07-02 PROCEDURE — 36415 COLL VENOUS BLD VENIPUNCTURE: CPT

## 2020-07-02 PROCEDURE — 85025 COMPLETE CBC W/AUTO DIFF WBC: CPT

## 2020-07-02 RX ORDER — ESCITALOPRAM OXALATE 10 MG/1
10 TABLET ORAL DAILY
Qty: 30 TABLET | Refills: 5 | Status: SHIPPED | OUTPATIENT
Start: 2020-07-02

## 2020-07-02 RX ORDER — LEVOTHYROXINE SODIUM 0.2 MG/1
200 TABLET ORAL DAILY
COMMUNITY

## 2020-07-02 NOTE — PROGRESS NOTES
Subjective:    Chief Complaint:     Stan Parker is a 34 y.o. female who presents for a preoperative examination. She is scheduled to have R foot neuroma excision done by Dr. Nabil Ann at [facility] on  7/24/20. History of Present Illness: This 33 yo f  here for preoperative evaluation for chronic R  foot pain. She has a mari's neuroma and has failed conservative treatment.   -Consultation regarding her hypothyroidism and GERD. -Hypothyroidism- managed by Dr. Paul Sheth on Synthroid(brand) 175 mcg. -GERD- Stable on Protonix  -Chronic nausea. +Biliary dyskinesia with GBEF 4 %. U/s neg. EGD with Dr. Chilango Dietrich- normal per notes.     Past Medical History:   Diagnosis Date    Biliary dyskinesia     Chronic foot pain, right     Depression     Takes Lexapro    Follicular thyroid cancer ([address]     Surgery    GERD (gastroesophageal reflux disease)     Hypothyroidism     managed per Dr. Dora Dominguez Migraines     Last migraine: 8/11/19    PCOS (polycystic ovarian syndrome)     Takes BC        Review of patient's past surgical history indicates:     Past Surgical History:   Procedure Laterality Date    DILATION AND CURETTAGE OF UTERUS  08/01/2018    OVARIAN CYST REMOVAL Right 08/01/2018    done with D & C    THYROIDECTOMY Left 8/8/2019    LEFT THYROIDECTOMY performed by Yesi Buchanan MD at [facility] Right 8/26/2019    RIGHT COMPLETION THYROIDECTOMY performed by Yesi Buchnaan MD at [facility] OR                                                   Current Outpatient Medications   Medication Sig Dispense Refill    levothyroxine (SYNTHROID) 175 MCG tablet Take 175 mcg by mouth Daily      escitalopram (LEXAPRO) 10 MG tablet Take 1 tablet by mouth daily 30 tablet 5    pantoprazole (PROTONIX) 40 MG tablet Take 40 mg by mouth daily      BLISOVI FE 1/20 1-20 MG-MCG per tablet Take 1 tablet by mouth daily 1 packet 5    calcium carbonate 648 MG TABS Take 2 tablets by mouth 2 times daily 120 tablet 3     No current facility-administered medications for this visit. Allergies   Allergen Reactions    Codeine Swelling    Pcn [Penicillins]     Cheese      Gi upset       Social History     Tobacco Use    Smoking status: Never Smoker    Smokeless tobacco: Never Used   Substance Use Topics    Alcohol use: Yes     Comment: Rarely - 1 x year    Drug use: Never        Family History   Problem Relation Age of Onset   Gandhi Cancer Mother         Breast    Arthritis Mother     Hypertension Father     COPD Father     Hypertension Sister     Diabetes Other         Review Of Systems  Constitution: Slight  fatigue  Skin: negative  Eyes: negative  Ears/Nose/Throat: negative  Respiratory: negative  Cardiovascular: negative  Gastrointestinal: Chronic nausea  Genitourinary: negative  Musculoskeletal: Chronic R foot pain  Neurologic: negative  Psychiatric: Depression-stable  Hematologic/Lymphatic/Immunologic: negative  Endocrine: negative       Objective:      /88   Pulse 95   Temp 97.3 °F (36.3 °C)   Ht 5' 4\" (1.626 m)   Wt (!) 307 lb 3.2 oz (139.3 kg)   SpO2 97%   BMI 52.73 kg/m²   General appearance - healthy, alert, no distress  Skin -  No rashes or lesions. Head - Normocephalic. No masses, lesions, tenderness or abnormalities  Eyes - conjunctivae/corneas clear. PERRL, EOM's intact. Ears - External ears normal. Canals clear. TM's normal.  Nose/Sinuses - Nares normal. No drainage or sinus tenderness. Oropharynx - Lips, mucosa, and tongue normal. Teeth and gums normal.   Neck - Neck supple. No adenopathy. Healed scar from thyroidectomy  Back - Back without tenderness. No CVA tenderness. Lungs -Lungs clear. No rales, rhonchi or wheeze  Heart - Regular rate and rhythm, with no murmur  Abdomen - Abdomen soft, non-tender. BS normal  Extremities - Extremities normal. No deformities, edema, or skin discolora  Musculoskeletal - R foot in boot  Psychiatric: Mood stable  Neuro: CN intact.  No focal deficits

## 2020-07-20 ENCOUNTER — HOSPITAL ENCOUNTER (OUTPATIENT)
Dept: LAB | Age: 30
Discharge: HOME | End: 2020-07-20
Payer: COMMERCIAL

## 2020-07-20 PROCEDURE — 36415 COLL VENOUS BLD VENIPUNCTURE: CPT

## 2020-07-20 PROCEDURE — U0002 COVID-19 LAB TEST NON-CDC: HCPCS

## 2020-07-22 ENCOUNTER — TELEPHONE (OUTPATIENT)
Dept: INTERNAL MEDICINE CLINIC | Age: 30
End: 2020-07-22

## 2020-07-22 NOTE — TELEPHONE ENCOUNTER
Dr Kelly Del Cid office called, patient having foot surgery 7/24 and they still have not received the Pre OP paperwork.  Please fax to [phone]

## 2020-07-22 NOTE — PROGRESS NOTES
Spoke with pt. Via telephone /). Pt. will arrive at the ER entrance at (0600) on (d07/24/2020). Pt.informed that they may have one visitor come with them. The visitor must be free of covid symptoms. Both of them must wear a mask upon entering the hospital.  If they do not have a mask, one will be given to them at the . The masks must be worn the whole time they are in the building. The visitor must stay in the assigned room in Same Day Surgery. The visitor may not go to the vending machines, cafeteria, or walk around in the hospital. Pt. Will be NPO after MN tonight, including no gum, candy, or nicotine products. Pt. will take (medication) with a tiny sip of water the morning of the procedure. If the patient uses inhalers or cpap, they will bring them with them to the hospital.  Pt. will shower with soap and water and will not use any lotions, creams, ointments on their skin. Pt. will wear no jewelry or metal. Covid-19 test was completed on (07/20/2020)  and results are (pending). Pt. Has been self-quarantining since their Covid-19 testing. Pt. Has had no cough, sore throat, fever, or any other unusual s/s that the physician should be made aware of before surgery. Pt. Had no further questions and/or concerns at this time. SDS phone number was given should any further questions arise. [phone].

## 2020-07-23 ENCOUNTER — ANESTHESIA EVENT (OUTPATIENT)
Dept: OPERATING ROOM | Age: 30
End: 2020-07-23
Payer: COMMERCIAL

## 2020-07-23 NOTE — ANESTHESIA PRE PROCEDURE
Department of Anesthesiology  Preprocedure Note       Name:  Peterson Enrqiuez   Age:  34 y.o.  :  1990                                          MRN:  [id number]         Date:  2020      Surgeon: Brendan Macias):  Joaquin Laguna DPM    Procedure: Procedure(s):  RIGHT FOOT NEUROMA EXCISION    Medications prior to admission:   Prior to Admission medications    Medication Sig Start Date End Date Taking? Authorizing Provider   levothyroxine (SYNTHROID) 175 MCG tablet Take 175 mcg by mouth Daily   Yes Historical Provider, MD   escitalopram (LEXAPRO) 10 MG tablet Take 1 tablet by mouth daily 20  Yes Santos Mccauley, DO   pantoprazole (PROTONIX) 40 MG tablet Take 40 mg by mouth daily   Yes Historical Provider, MD   BLISOVI FE  1-20 MG-MCG per tablet Take 1 tablet by mouth daily 20  Yes Santos Mccauley DO   calcium carbonate 648 MG TABS Take 2 tablets by mouth 2 times daily 19 Yes Natalie Rodas MD       Current medications:    No current facility-administered medications for this encounter. Current Outpatient Medications   Medication Sig Dispense Refill    levothyroxine (SYNTHROID) 175 MCG tablet Take 175 mcg by mouth Daily      escitalopram (LEXAPRO) 10 MG tablet Take 1 tablet by mouth daily 30 tablet 5    pantoprazole (PROTONIX) 40 MG tablet Take 40 mg by mouth daily      BLISOVI FE  1-20 MG-MCG per tablet Take 1 tablet by mouth daily 1 packet 5    calcium carbonate 648 MG TABS Take 2 tablets by mouth 2 times daily 120 tablet 3       Allergies:     Allergies   Allergen Reactions    Codeine Swelling    Pcn [Penicillins]     Cheese      Gi upset       Problem List:    Patient Active Problem List   Diagnosis Code    Follicular neoplasm of thyroid E75.6    Follicular adenoma of thyroid gland O10    Follicular carcinoma ([address] C73    Primary thyroid follicular carcinoma ([address] C73       Past Medical History:        Diagnosis Date    Biliary dyskinesia     Chronic foot pain, right  Depression     Takes Lexapro    Follicular [facility]     Surgery    GERD (gastroesophageal reflux disease)     Hypothyroidism     managed per Dr. Sheng Vora Migraines     Last migraine: 8/11/19    PCOS (polycystic ovarian syndrome)     Takes BC       Past Surgical History:        Procedure Laterality Date    DILATION AND CURETTAGE OF UTERUS  08/01/2018    OVARIAN CYST REMOVAL Right 08/01/2018    done with D & C    THYROIDECTOMY Left 8/8/2019    LEFT THYROIDECTOMY performed by Leana Traore MD at [facility] Right 8/26/2019    RIGHT COMPLETION THYROIDECTOMY performed by Leana Traore MD at [facility] History:    Social History     Tobacco Use    Smoking status: Never Smoker    Smokeless tobacco: Never Used   Substance Use Topics    Alcohol use: Yes     Comment: Rarely - 1 x year                                Counseling given: Not Answered      Vital Signs (Current):   Vitals:    07/22/20 1227   Weight: 300 lb (136.1 kg)   Height: 5' 4\" (1.626 m)                                              BP Readings from Last 3 Encounters:   07/02/20 120/88   05/07/20 118/71   02/28/20 122/80       NPO Status:                                                                                 BMI:   Wt Readings from Last 3 Encounters:   07/02/20 (!) 307 lb 3.2 oz (139.3 kg)   05/07/20 299 lb (135.6 kg)   04/03/20 294 lb (133.4 kg)     Body mass index is 51.49 kg/m².     CBC:   Lab Results   Component Value Date    WBC 7.4 07/02/2020    RBC 4.72 07/02/2020    HGB 13.9 07/02/2020    HCT 43.0 07/02/2020    MCV 91.1 07/02/2020    RDW 12.6 07/02/2020     07/02/2020       CMP:   Lab Results   Component Value Date     07/02/2020    K 4.1 07/02/2020     07/02/2020    CO2 25 07/02/2020    BUN 9 07/02/2020    CREATININE 0.9 07/02/2020    GFRAA >60 07/02/2020    LABGLOM >60 07/02/2020    GLUCOSE 108 07/02/2020    PROT 6.7 07/02/2020    CALCIUM 9.2 07/02/2020    BILITOT 0.3 07/02/2020    ALKPHOS 82 07/02/2020    AST 15 07/02/2020    ALT 11 07/02/2020       POC Tests: No results for input(s): POCGLU, POCNA, POCK, POCCL, POCBUN, POCHEMO, POCHCT in the last 72 hours. Coags: No results found for: PROTIME, INR, APTT    HCG (If Applicable):   Lab Results   Component Value Date    PREGTESTUR NEGATIVE 08/08/2019    PREGTESTUR (NOTE)  PERFORMED BY POINT OF CARE METHOD.   08/08/2019        ABGs: No results found for: PHART, PO2ART, ZPY4LVL, KAM5JXD, BEART, T6KNMINW     Type & Screen (If Applicable):  No results found for: LABABO, LABRH    Drug/Infectious Status (If Applicable):  No results found for: HIV, HEPCAB    COVID-19 Screening (If Applicable): No results found for: COVID19      Anesthesia Evaluation    Airway:         Dental:          Pulmonary:                              Cardiovascular:             Beta Blocker:  Not on Beta Blocker         Neuro/Psych:   (+) headaches: migraine headaches, depression/anxiety             GI/Hepatic/Renal:   (+) GERD:, morbid obesity          Endo/Other:    (+) hypothyroidism::., .                 Abdominal:   (+) obese,         Vascular:                                        Anesthesia Plan      general and MAC     ASA 3       Induction: intravenous. Pre Anesthesia Assessment complete.  Chart reviewed on 7/23/2020        Janese Lesch, APRN - GRANT   7/23/2020

## 2020-07-24 ENCOUNTER — ANESTHESIA (OUTPATIENT)
Dept: OPERATING ROOM | Age: 30
End: 2020-07-24
Payer: COMMERCIAL

## 2020-07-24 ENCOUNTER — HOSPITAL ENCOUNTER (OUTPATIENT)
Age: 30
Setting detail: OUTPATIENT SURGERY
Discharge: HOME | End: 2020-07-24
Attending: PODIATRIST | Admitting: PODIATRIST
Payer: COMMERCIAL

## 2020-07-24 VITALS
DIASTOLIC BLOOD PRESSURE: 60 MMHG | RESPIRATION RATE: 25 BRPM | OXYGEN SATURATION: 99 % | SYSTOLIC BLOOD PRESSURE: 116 MMHG

## 2020-07-24 VITALS
BODY MASS INDEX: 50.02 KG/M2 | WEIGHT: 293 LBS | TEMPERATURE: 97 F | HEART RATE: 70 BPM | SYSTOLIC BLOOD PRESSURE: 149 MMHG | DIASTOLIC BLOOD PRESSURE: 108 MMHG | OXYGEN SATURATION: 100 % | HEIGHT: 64 IN | RESPIRATION RATE: 16 BRPM

## 2020-07-24 LAB
PREGNANCY TEST URINE, POC: NEGATIVE
SARS-COV-2: NOT DETECTED
SOURCE: NORMAL

## 2020-07-24 PROCEDURE — 6370000000 HC RX 637 (ALT 250 FOR IP): Performed by: PODIATRIST

## 2020-07-24 PROCEDURE — 3600000002 HC SURGERY LEVEL 2 BASE: Performed by: PODIATRIST

## 2020-07-24 PROCEDURE — 7100000011 HC PHASE II RECOVERY - ADDTL 15 MIN: Performed by: PODIATRIST

## 2020-07-24 PROCEDURE — 7100000010 HC PHASE II RECOVERY - FIRST 15 MIN: Performed by: PODIATRIST

## 2020-07-24 PROCEDURE — 2580000003 HC RX 258: Performed by: PODIATRIST

## 2020-07-24 PROCEDURE — 2500000003 HC RX 250 WO HCPCS: Performed by: PODIATRIST

## 2020-07-24 PROCEDURE — 3600000012 HC SURGERY LEVEL 2 ADDTL 15MIN: Performed by: PODIATRIST

## 2020-07-24 PROCEDURE — 6360000002 HC RX W HCPCS: Performed by: NURSE ANESTHETIST, CERTIFIED REGISTERED

## 2020-07-24 PROCEDURE — 3700000001 HC ADD 15 MINUTES (ANESTHESIA): Performed by: PODIATRIST

## 2020-07-24 PROCEDURE — 2709999900 HC NON-CHARGEABLE SUPPLY: Performed by: PODIATRIST

## 2020-07-24 PROCEDURE — 81025 URINE PREGNANCY TEST: CPT

## 2020-07-24 PROCEDURE — 3700000000 HC ANESTHESIA ATTENDED CARE: Performed by: PODIATRIST

## 2020-07-24 RX ORDER — PROPOFOL 10 MG/ML
INJECTION, EMULSION INTRAVENOUS PRN
Status: DISCONTINUED | OUTPATIENT
Start: 2020-07-24 | End: 2020-07-24 | Stop reason: SDUPTHER

## 2020-07-24 RX ORDER — FENTANYL CITRATE 50 UG/ML
INJECTION, SOLUTION INTRAMUSCULAR; INTRAVENOUS PRN
Status: DISCONTINUED | OUTPATIENT
Start: 2020-07-24 | End: 2020-07-24 | Stop reason: SDUPTHER

## 2020-07-24 RX ORDER — SODIUM CHLORIDE, SODIUM LACTATE, POTASSIUM CHLORIDE, CALCIUM CHLORIDE 600; 310; 30; 20 MG/100ML; MG/100ML; MG/100ML; MG/100ML
INJECTION, SOLUTION INTRAVENOUS CONTINUOUS
Status: DISCONTINUED | OUTPATIENT
Start: 2020-07-24 | End: 2020-07-24 | Stop reason: HOSPADM

## 2020-07-24 RX ORDER — MIDAZOLAM HYDROCHLORIDE 1 MG/ML
INJECTION INTRAMUSCULAR; INTRAVENOUS PRN
Status: DISCONTINUED | OUTPATIENT
Start: 2020-07-24 | End: 2020-07-24 | Stop reason: SDUPTHER

## 2020-07-24 RX ORDER — SODIUM CHLORIDE 0.9 % (FLUSH) 0.9 %
10 SYRINGE (ML) INJECTION ONCE
Status: COMPLETED | OUTPATIENT
Start: 2020-07-24 | End: 2020-07-24

## 2020-07-24 RX ORDER — DIAPER,BRIEF,INFANT-TODD,DISP
EACH MISCELLANEOUS
Status: COMPLETED | OUTPATIENT
Start: 2020-07-24 | End: 2020-07-24

## 2020-07-24 RX ADMIN — PROPOFOL 50 MG: 10 INJECTION, EMULSION INTRAVENOUS at 08:12

## 2020-07-24 RX ADMIN — SODIUM CHLORIDE, PRESERVATIVE FREE 10 ML: 5 INJECTION INTRAVENOUS at 06:50

## 2020-07-24 RX ADMIN — PROPOFOL 50 MG: 10 INJECTION, EMULSION INTRAVENOUS at 08:03

## 2020-07-24 RX ADMIN — SODIUM CHLORIDE, POTASSIUM CHLORIDE, SODIUM LACTATE AND CALCIUM CHLORIDE: 600; 310; 30; 20 INJECTION, SOLUTION INTRAVENOUS at 06:50

## 2020-07-24 RX ADMIN — PROPOFOL 50 MG: 10 INJECTION, EMULSION INTRAVENOUS at 08:00

## 2020-07-24 RX ADMIN — PROPOFOL 50 MG: 10 INJECTION, EMULSION INTRAVENOUS at 08:09

## 2020-07-24 RX ADMIN — PROPOFOL 50 MG: 10 INJECTION, EMULSION INTRAVENOUS at 08:06

## 2020-07-24 RX ADMIN — MIDAZOLAM 2 MG: 1 INJECTION INTRAMUSCULAR; INTRAVENOUS at 07:39

## 2020-07-24 RX ADMIN — PROPOFOL 50 MG: 10 INJECTION, EMULSION INTRAVENOUS at 08:16

## 2020-07-24 RX ADMIN — PROPOFOL 50 MG: 10 INJECTION, EMULSION INTRAVENOUS at 07:52

## 2020-07-24 RX ADMIN — PROPOFOL 50 MG: 10 INJECTION, EMULSION INTRAVENOUS at 07:57

## 2020-07-24 RX ADMIN — FENTANYL CITRATE 25 MCG: 50 INJECTION INTRAMUSCULAR; INTRAVENOUS at 08:12

## 2020-07-24 RX ADMIN — PROPOFOL 60 MG: 10 INJECTION, EMULSION INTRAVENOUS at 07:45

## 2020-07-24 RX ADMIN — FENTANYL CITRATE 25 MCG: 50 INJECTION INTRAMUSCULAR; INTRAVENOUS at 08:15

## 2020-07-24 RX ADMIN — PROPOFOL 40 MG: 10 INJECTION, EMULSION INTRAVENOUS at 07:48

## 2020-07-24 ASSESSMENT — PULMONARY FUNCTION TESTS
PIF_VALUE: -12
PIF_VALUE: -10
PIF_VALUE: -12
PIF_VALUE: 0
PIF_VALUE: -12
PIF_VALUE: -11
PIF_VALUE: -12
PIF_VALUE: -11
PIF_VALUE: -12
PIF_VALUE: -11
PIF_VALUE: 0
PIF_VALUE: -10
PIF_VALUE: -12
PIF_VALUE: -9
PIF_VALUE: -12
PIF_VALUE: 0
PIF_VALUE: -12
PIF_VALUE: -9
PIF_VALUE: -12

## 2020-07-24 ASSESSMENT — PAIN DESCRIPTION - ORIENTATION
ORIENTATION: RIGHT
ORIENTATION: RIGHT

## 2020-07-24 ASSESSMENT — PAIN DESCRIPTION - PAIN TYPE
TYPE: SURGICAL PAIN
TYPE: SURGICAL PAIN

## 2020-07-24 ASSESSMENT — PAIN - FUNCTIONAL ASSESSMENT: PAIN_FUNCTIONAL_ASSESSMENT: 0-10

## 2020-07-24 ASSESSMENT — PAIN DESCRIPTION - LOCATION
LOCATION: FOOT
LOCATION: FOOT

## 2020-07-24 ASSESSMENT — PAIN SCALES - GENERAL
PAINLEVEL_OUTOF10: 4
PAINLEVEL_OUTOF10: 4
PAINLEVEL_OUTOF10: 0

## 2020-07-24 NOTE — ANESTHESIA PRE PROCEDURE
Department of Anesthesiology  Preprocedure Note       Name:  Kamron Jensen   Age:  34 y.o.  :  1990                                          MRN:  [id number]         Date:  2020      Surgeon: Gerson Garcia):  Viji Caicedo DPM    Procedure: Procedure(s):  RIGHT FOOT NEUROMA EXCISION    Medications prior to admission:   Prior to Admission medications    Medication Sig Start Date End Date Taking? Authorizing Provider   levothyroxine (SYNTHROID) 175 MCG tablet Take 175 mcg by mouth Daily    Historical Provider, MD   escitalopram (LEXAPRO) 10 MG tablet Take 1 tablet by mouth daily 20   Yariel Levels, DO   pantoprazole (PROTONIX) 40 MG tablet Take 40 mg by mouth daily    Historical Provider, MD MURCIAISOVI FE  1-20 MG-MCG per tablet Take 1 tablet by mouth daily 20   Yariel Levels, DO   calcium carbonate 648 MG TABS Take 2 tablets by mouth 2 times daily 19  Rashawn Potts MD       Current medications:    No current facility-administered medications for this visit. No current outpatient medications on file. Facility-Administered Medications Ordered in Other Visits   Medication Dose Route Frequency Provider Last Rate Last Dose    lactated ringers infusion   Intravenous Continuous Viji Caicedo  mL/hr at 20 9770         Allergies:     Allergies   Allergen Reactions    Codeine Swelling    Pcn [Penicillins]     Cheese      Gi upset       Problem List:    Patient Active Problem List   Diagnosis Code    Follicular neoplasm of thyroid P03.5    Follicular adenoma of thyroid gland B66    Follicular carcinoma ([facility] C73    Primary thyroid follicular carcinoma ([facility]) C73       Past Medical History:        Diagnosis Date    Biliary dyskinesia     Chronic foot pain, right     Depression     Takes Lexapro    Follicular thyroid cancer ([address]     Surgery    GERD (gastroesophageal reflux disease)     Hypothyroidism     managed per Dr. Islas Getting Migraines     Last migraine: 19  PCOS (polycystic ovarian syndrome)     Takes BC       Past Surgical History:        Procedure Laterality Date    DILATION AND CURETTAGE OF UTERUS  08/01/2018    OVARIAN CYST REMOVAL Right 08/01/2018    done with D & C    THYROIDECTOMY Left 8/8/2019    LEFT THYROIDECTOMY performed by Rashawn Potts MD at [address] [place] [address] Rd Right 8/26/2019    RIGHT COMPLETION THYROIDECTOMY performed by Rashawn Potts MD at [facility] OR       Social History:    Social History     Tobacco Use    Smoking status: Never Smoker    Smokeless tobacco: Never Used   Substance Use Topics    Alcohol use: Yes     Comment: Rarely - 1 x year                                Counseling given: Not Answered      Vital Signs (Current): There were no vitals filed for this visit. BP Readings from Last 3 Encounters:   07/24/20 (!) 129/94   07/02/20 120/88   05/07/20 118/71       NPO Status:                                                                                 BMI:   Wt Readings from Last 3 Encounters:   07/24/20 300 lb (136.1 kg)   07/02/20 (!) 307 lb 3.2 oz (139.3 kg)   05/07/20 299 lb (135.6 kg)     There is no height or weight on file to calculate BMI.    CBC:   Lab Results   Component Value Date    WBC 7.4 07/02/2020    RBC 4.72 07/02/2020    HGB 13.9 07/02/2020    HCT 43.0 07/02/2020    MCV 91.1 07/02/2020    RDW 12.6 07/02/2020     07/02/2020       CMP:   Lab Results   Component Value Date     07/02/2020    K 4.1 07/02/2020     07/02/2020    CO2 25 07/02/2020    BUN 9 07/02/2020    CREATININE 0.9 07/02/2020    GFRAA >60 07/02/2020    LABGLOM >60 07/02/2020    GLUCOSE 108 07/02/2020    PROT 6.7 07/02/2020    CALCIUM 9.2 07/02/2020    BILITOT 0.3 07/02/2020    ALKPHOS 82 07/02/2020    AST 15 07/02/2020    ALT 11 07/02/2020       POC Tests: No results for input(s): POCGLU, POCNA, POCK, POCCL, POCBUN, POCHEMO, POCHCT in the last 72 hours.     Coags: No results found for: PROTIME, INR, APTT    HCG (If Applicable):   Lab Results   Component Value Date    PREGTESTUR NEGATIVE 07/24/2020        ABGs: No results found for: PHART, PO2ART, GZL9OLJ, ACJ7GNP, BEART, O7HIEQDB     Type & Screen (If Applicable):  No results found for: LABABO, LABRH    Drug/Infectious Status (If Applicable):  No results found for: HIV, HEPCAB    COVID-19 Screening (If Applicable): No results found for: COVID19      Anesthesia Evaluation  Patient summary reviewed and Nursing notes reviewed  Airway: Mallampati: I  TM distance: >3 FB   Neck ROM: limited  Mouth opening: > = 3 FB Dental:          Pulmonary:normal exam                               Cardiovascular:  Exercise tolerance: good (>4 METS),           Rhythm: regular  Rate: normal           Beta Blocker:  Not on Beta Blocker         Neuro/Psych:   (+) headaches: migraine headaches, depression/anxiety             GI/Hepatic/Renal:   (+) GERD: well controlled, morbid obesity          Endo/Other:    (+) hypothyroidism::., .                 Abdominal:   (+) obese,         Vascular: negative vascular ROS. Anesthesia Plan      general and MAC     ASA 3       Induction: intravenous. Anesthetic plan and risks discussed with patient. Plan discussed with CRNA. Pre Anesthesia Assessment complete.  Chart reviewed on 7/24/2020        CAM Salamanca - CRNA   7/24/2020

## 2020-07-24 NOTE — PROGRESS NOTES
[facility] in [address] (Same Day Surgery)    Do not drive, work around [address] or use equipment. Do not drink any alcoholic beverages. Do not smoke while alone. Avoid making important decisions. Plan to spend a quiet, relaxed evening @ home. Resume normal activities as you begin to feel better. Eat lightly for your first meal, then gradually increase your diet to what is normal for you. In case of nausea, avoid food and drink only clear liquids. Resume food as nausea ceases. Notify your surgeon if you experience fever, chills, large amount of bleeding, difficulty breathing, persistent nausea and vomiting or any other disturbing problem. Call for a follow-up appointment with your surgeon.

## 2020-07-24 NOTE — ANESTHESIA POSTPROCEDURE EVALUATION
Department of Anesthesiology  Postprocedure Note    Patient: Mayelin Schmitz  MRN: [id number]  YOB: 1990  Date of evaluation: 7/24/2020  Time:  8:29 AM     Procedure Summary     Date:  07/24/20 Room / Location:  [address] [place] [address] 01 / [facility]    Anesthesia Start:  2346 Anesthesia Stop:  4253    Procedure:  RIGHT FOOT NEUROMA EXCISION (Right Foot) Diagnosis:  (NEUROMA OF RIGHT FOOT)    Surgeon:  Dinora Meyers DPM Responsible Provider:  CAM Crawley CRNA    Anesthesia Type:  general, MAC ASA Status:  3          Anesthesia Type: general, MAC    Fabby Phase I: Fabby Score: 10    Fabby Phase II:      Last vitals: Reviewed and per EMR flowsheets.        Anesthesia Post Evaluation    Patient location during evaluation: bedside  Patient participation: complete - patient participated  Level of consciousness: awake and alert  Pain score: 0  Airway patency: patent  Nausea & Vomiting: no vomiting and no nausea  Complications: no  Cardiovascular status: blood pressure returned to baseline and hemodynamically stable  Respiratory status: acceptable, room air, spontaneous ventilation and nonlabored ventilation  Hydration status: stable

## 2020-07-31 ENCOUNTER — TELEMEDICINE (OUTPATIENT)
Dept: BARIATRICS/WEIGHT MGMT | Age: 30
End: 2020-07-31
Payer: COMMERCIAL

## 2020-07-31 PROBLEM — K81.1 CHRONIC CHOLECYSTITIS: Status: ACTIVE | Noted: 2020-07-31

## 2020-07-31 PROCEDURE — 99214 OFFICE O/P EST MOD 30 MIN: CPT | Performed by: SURGERY

## 2020-07-31 ASSESSMENT — ENCOUNTER SYMPTOMS
BLOOD IN STOOL: 0
TROUBLE SWALLOWING: 0
PHOTOPHOBIA: 0
VOMITING: 0
COUGH: 0
NAUSEA: 0
COLOR CHANGE: 0
SORE THROAT: 0
DIARRHEA: 0
WHEEZING: 0
CONSTIPATION: 0
VOICE CHANGE: 0
ANAL BLEEDING: 0
ABDOMINAL PAIN: 1
SHORTNESS OF BREATH: 0

## 2020-07-31 NOTE — PROGRESS NOTES
carbonate 648 MG TABS Take 2 tablets by mouth 2 times daily  Carol Schumacher MD       Social History     Tobacco Use    Smoking status: Never Smoker    Smokeless tobacco: Never Used   Substance Use Topics    Alcohol use: Yes     Comment: Rarely - 1 x year    Drug use: Never        Allergies   Allergen Reactions    Codeine Swelling    Pcn [Penicillins]     Cheese      Gi upset   ,   Past Medical History:   Diagnosis Date    Biliary dyskinesia     Chronic foot pain, right     Depression     Takes Lexapro    Follicular thyroid cancer ([address]     Surgery    GERD (gastroesophageal reflux disease)     Hypothyroidism     managed per Dr. Escobar Reyes Migraines     Last migraine: 8/11/19    PCOS (polycystic ovarian syndrome)     Jelani Bagley   ,   Past Surgical History:   Procedure Laterality Date    DILATION AND CURETTAGE OF UTERUS  08/01/2018    FOOT NEUROMA SURGERY Right 7/24/2020    RIGHT FOOT NEUROMA EXCISION performed by Amador Alberts DPM at [place] [address] Right 08/01/2018    done with D & C    THYROIDECTOMY Left 8/8/2019    LEFT THYROIDECTOMY performed by Carol Schumacher MD at [address] [place] [address] Rd Right 8/26/2019    RIGHT COMPLETION THYROIDECTOMY performed by Carol Schumacher MD at [address]   ,   Social History     Tobacco Use    Smoking status: Never Smoker    Smokeless tobacco: Never Used   Substance Use Topics    Alcohol use: Yes     Comment: Rarely - 1 x year    Drug use: Never   ,   Family History   Problem Relation Age of Onset    Cancer Mother         Breast    Arthritis Mother     Hypertension Father     COPD Father     Hypertension Sister     Diabetes Other    ,   Immunization History   Administered Date(s) Administered    Tdap (Boostrix, Adacel) 05/23/2019   ,   Health Maintenance   Topic Date Due    Varicella vaccine (1 of 2 - 2-dose childhood series) 09/22/1991    HIV screen  09/22/2005    Flu vaccine (1) 09/01/2020    Cervical cancer screen  04/04/2022    DTaP/Tdap/Td vaccine (7 - Td) 05/23/2029    Hib vaccine  Completed    Hepatitis A vaccine  Aged Out    Hepatitis B vaccine  Aged Out    Meningococcal (ACWY) vaccine  Aged Out    Pneumococcal 0-64 years Vaccine  Aged Out       PHYSICAL EXAMINATION:  [ INSTRUCTIONS:  \"[x]\" Indicates a positive item  \"[]\" Indicates a negative item  -- DELETE ALL ITEMS NOT EXAMINED]  Vital Signs: (As obtained by patient/caregiver or practitioner observation)    Blood pressure-  Heart rate-    Respiratory rate-    Temperature-  Pulse oximetry-     Constitutional: [x] Appears well-developed and well-nourished [x] No apparent distress      [] Abnormal-   Mental status  [x] Alert and awake  [x] Oriented to person/place/time [x]Able to follow commands      Eyes:  EOM    [x]  Normal  [] Abnormal-  Sclera  [x]  Normal  [] Abnormal -         Discharge [x]  None visible  [] Abnormal -    HENT:   [x] Normocephalic, atraumatic. [] Abnormal   [x] Mouth/Throat: Mucous membranes are moist.     External Ears [x] Normal  [] Abnormal-     Neck: [x] No visualized mass     Pulmonary/Chest: [x] Respiratory effort normal.  [x] No visualized signs of difficulty breathing or respiratory distress        [] Abnormal-      Musculoskeletal:   [x] Normal gait with no signs of ataxia         [x] Normal range of motion of neck        [] Abnormal-       Neurological:        [x] No Facial Asymmetry (Cranial nerve 7 motor function) (limited exam to video visit)          [x] No gaze palsy        [] Abnormal-         Skin:        [x] No significant exanthematous lesions or discoloration noted on facial skin         [] Abnormal-            Psychiatric:       [x] Normal Affect [x] No Hallucinations        [] Abnormal-     Other pertinent observable physical exam findings-     ASSESSMENT/PLAN:    1. Chronic cholecystitis    For lap wilbur, Counseled in length and will proceed. Return in about 2 weeks (around 8/14/2020), or Lap wilbur, for Surgery.     [facility]

## 2020-08-04 ENCOUNTER — TELEPHONE (OUTPATIENT)
Dept: BARIATRICS/WEIGHT MGMT | Age: 30
End: 2020-08-04

## 2020-08-04 NOTE — TELEPHONE ENCOUNTER
SPOKE TO [address] (Annamarie Hdz) SCHEDULED @ [facility].  NOTIFIED OF DATES, TIMES AND LOCATION    PHONE ASSESSMENT  SURGERY - 8/13/20 @ 1349, 1145 arrival  P/O - 8/28/20 at 1500 Virtual video    NPO AFTER MIDNIGHT  Covid-19 Culture on 8/7/20 @ [address] - Not on thinners   SENT

## 2020-08-07 ENCOUNTER — HOSPITAL ENCOUNTER (OUTPATIENT)
Dept: LAB | Age: 30
Discharge: HOME | End: 2020-08-07
Payer: COMMERCIAL

## 2020-08-07 PROCEDURE — U0002 COVID-19 LAB TEST NON-CDC: HCPCS

## 2020-08-09 LAB
SARS-COV-2: NOT DETECTED
SOURCE: NORMAL

## 2020-08-11 NOTE — PROGRESS NOTES
8/11/20 - Patient notified surgery changed to 8/14/20 @ [phone], arrival 0645. [facility] verbalized understanding.

## 2020-08-12 NOTE — TELEPHONE ENCOUNTER
Called and left VM for Kacey Sale is rescheduled for 8/14/20 at [address] [place] [address] with arrival time of 0645.

## 2020-08-13 ENCOUNTER — ANESTHESIA EVENT (OUTPATIENT)
Dept: OPERATING ROOM | Age: 30
End: 2020-08-13
Payer: COMMERCIAL

## 2020-08-13 NOTE — ANESTHESIA PRE PROCEDURE
Department of Anesthesiology  Preprocedure Note       Name:  Nancy Wolf   Age:  34 y.o.  :  1990                                          MRN:  [id number]         Date:  2020      Surgeon: Tomasa Rivera):  Margareth Sandoval MD    Procedure: Procedure(s):  CHOLECYSTECTOMY LAPAROSCOPIC    Medications prior to admission:   Prior to Admission medications    Medication Sig Start Date End Date Taking? Authorizing Provider   levothyroxine (SYNTHROID) 175 MCG tablet Take 175 mcg by mouth Daily    Historical Provider, MD   escitalopram (LEXAPRO) 10 MG tablet Take 1 tablet by mouth daily 20   Zuri Atkins, DO   pantoprazole (PROTONIX) 40 MG tablet Take 40 mg by mouth nightly     Historical Provider, MD   BLISOVI FE  1-20 MG-MCG per tablet Take 1 tablet by mouth daily 20   Zuri Atkins,    calcium carbonate 648 MG TABS Take 2 tablets by mouth 2 times daily 19  Margareth Sandoval MD       Current medications:    No current facility-administered medications for this encounter. Current Outpatient Medications   Medication Sig Dispense Refill    levothyroxine (SYNTHROID) 175 MCG tablet Take 175 mcg by mouth Daily      escitalopram (LEXAPRO) 10 MG tablet Take 1 tablet by mouth daily 30 tablet 5    pantoprazole (PROTONIX) 40 MG tablet Take 40 mg by mouth nightly       BLISOVI FE  1-20 MG-MCG per tablet Take 1 tablet by mouth daily 1 packet 5    calcium carbonate 648 MG TABS Take 2 tablets by mouth 2 times daily 120 tablet 3       Allergies:     Allergies   Allergen Reactions    Cinnamon Flavor Anaphylaxis     \"allergic to cinnamon candy and cinnamon extract\"\"hives up an down my throat    Codeine Swelling    Pcn [Penicillins] Other (See Comments)     \"had black bruises \"    Cheese      Gi upset       Problem List:    Patient Active Problem List   Diagnosis Code    Follicular neoplasm of thyroid R13.3    Follicular adenoma of thyroid gland U24    Follicular carcinoma ([facility] [address] C73  Primary thyroid follicular carcinoma (HCC) 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 Maria Guadalupe Low     Last migraine: 8/11/19    PCOS (polycystic ovarian syndrome)     Takes BC    Wears glasses        Past Surgical History:        Procedure Laterality Date    DILATION AND CURETTAGE OF UTERUS  08/01/2018    FOOT NEUROMA SURGERY Right 7/24/2020    RIGHT FOOT NEUROMA EXCISION performed by Elmo Barth DPM at [place] [address] Right 08/01/2018    done with D & C    THYROIDECTOMY Left 8/8/2019    LEFT THYROIDECTOMY performed by Ezra Cline MD at [address] [place] [address] Right 8/26/2019    RIGHT COMPLETION THYROIDECTOMY performed by Ezra Cline MD at [address] [place] [address] History:    Social History     Tobacco Use    Smoking status: Never Smoker    Smokeless tobacco: Never Used   Substance Use Topics    Alcohol use: Yes     Comment: Rarely - 1 x year                                Counseling given: Not Answered      Vital Signs (Current):   Vitals:    08/10/20 1543   Weight: 300 lb (136.1 kg)   Height: 5' 4\" (1.626 m)                                              BP Readings from Last 3 Encounters:   07/24/20 116/60   07/24/20 (!) 149/108   07/02/20 120/88       NPO Status:                                                                                 BMI:   Wt Readings from Last 3 Encounters:   07/24/20 300 lb (136.1 kg)   07/02/20 (!) 307 lb 3.2 oz (139.3 kg)   05/07/20 299 lb (135.6 kg)     Body mass index is 51.49 kg/m².     CBC:   Lab Results   Component Value Date    WBC 7.4 07/02/2020    RBC 4.72 07/02/2020    HGB 13.9 07/02/2020    HCT 43.0 07/02/2020 MCV 91.1 07/02/2020    RDW 12.6 07/02/2020     07/02/2020       CMP:   Lab Results   Component Value Date     07/02/2020    K 4.1 07/02/2020     07/02/2020    CO2 25 07/02/2020    BUN 9 07/02/2020    CREATININE 0.9 07/02/2020    GFRAA >60 07/02/2020    LABGLOM >60 07/02/2020    GLUCOSE 108 07/02/2020    PROT 6.7 07/02/2020    CALCIUM 9.2 07/02/2020    BILITOT 0.3 07/02/2020    ALKPHOS 82 07/02/2020    AST 15 07/02/2020    ALT 11 07/02/2020       POC Tests: No results for input(s): POCGLU, POCNA, POCK, POCCL, POCBUN, POCHEMO, POCHCT in the last 72 hours. Coags: No results found for: PROTIME, INR, APTT    HCG (If Applicable):   Lab Results   Component Value Date    PREGTESTUR NEGATIVE 07/24/2020        ABGs: No results found for: PHART, PO2ART, EVX3UVA, UNG8IWY, BEART, N2TOGYVX     Type & Screen (If Applicable):  No results found for: LABABO, LABRH    Drug/Infectious Status (If Applicable):  No results found for: HIV, HEPCAB    COVID-19 Screening (If Applicable):   Lab Results   Component Value Date    COVID19 NOT DETECTED 08/07/2020         Anesthesia Evaluation  Patient summary reviewed and Nursing notes reviewed no history of anesthetic complications:   Airway: Mallampati: I  TM distance: >3 FB   Neck ROM: full  Mouth opening: > = 3 FB Dental: normal exam     Comment: Fillings  Denies loose/chipped/cracked teeth    Pulmonary:Negative Pulmonary ROS breath sounds clear to auscultation      (-) not a current smoker                           Cardiovascular:  Exercise tolerance: good (>4 METS),           Rhythm: regular  Rate: normal           Beta Blocker:  Not on Beta Blocker         Neuro/Psych:   (+) headaches: migraine headaches, depression/anxiety             GI/Hepatic/Renal:   (+) GERD: well controlled, morbid obesity          Endo/Other:    (+) hypothyroidism::., malignancy/cancer (thyroid).                  Abdominal:   (+) obese,         Vascular: Anesthesia Plan      general     ASA 3       Induction: intravenous. MIPS: Postoperative opioids intended and Prophylactic antiemetics administered. Anesthetic plan and risks discussed with patient. Plan discussed with CRNA. Pre Anesthesia Assessment complete.  Chart reviewed on 8/13/2020        CAM Avendaño - CRNA   8/13/2020

## 2020-08-13 NOTE — PROGRESS NOTES
Called to make sure was aware of time change since phone assessment- aware surg now 0845 with arrival at 18

## 2020-08-14 ENCOUNTER — ANESTHESIA (OUTPATIENT)
Dept: OPERATING ROOM | Age: 30
End: 2020-08-14
Payer: COMMERCIAL

## 2020-08-14 ENCOUNTER — HOSPITAL ENCOUNTER (OUTPATIENT)
Age: 30
Setting detail: OUTPATIENT SURGERY
Discharge: HOME | End: 2020-08-14
Attending: SURGERY | Admitting: SURGERY
Payer: COMMERCIAL

## 2020-08-14 VITALS
OXYGEN SATURATION: 93 % | SYSTOLIC BLOOD PRESSURE: 128 MMHG | DIASTOLIC BLOOD PRESSURE: 112 MMHG | RESPIRATION RATE: 2 BRPM | TEMPERATURE: 98.6 F

## 2020-08-14 VITALS
BODY MASS INDEX: 50.02 KG/M2 | HEART RATE: 91 BPM | OXYGEN SATURATION: 97 % | SYSTOLIC BLOOD PRESSURE: 158 MMHG | RESPIRATION RATE: 18 BRPM | HEIGHT: 64 IN | TEMPERATURE: 97.2 F | WEIGHT: 293 LBS | DIASTOLIC BLOOD PRESSURE: 81 MMHG

## 2020-08-14 LAB
ANION GAP SERPL CALCULATED.3IONS-SCNC: 9 MMOL/L (ref 4–16)
BUN BLDV-MCNC: 7 MG/DL (ref 6–23)
CALCIUM SERPL-MCNC: 8.6 MG/DL (ref 8.3–10.6)
CHLORIDE BLD-SCNC: 105 MMOL/L (ref 99–110)
CO2: 26 MMOL/L (ref 21–32)
CREAT SERPL-MCNC: 0.8 MG/DL (ref 0.6–1.1)
GFR AFRICAN AMERICAN: >60 ML/MIN/1.73M2
GFR NON-AFRICAN AMERICAN: >60 ML/MIN/1.73M2
GLUCOSE BLD-MCNC: 93 MG/DL (ref 70–99)
HCT VFR BLD CALC: 41.3 % (ref 37–47)
HEMOGLOBIN: 13.6 GM/DL (ref 12.5–16)
MCH RBC QN AUTO: 29.4 PG (ref 27–31)
MCHC RBC AUTO-ENTMCNC: 32.9 % (ref 32–36)
MCV RBC AUTO: 89.2 FL (ref 78–100)
PDW BLD-RTO: 12.5 % (ref 11.7–14.9)
PLATELET # BLD: 324 K/CU MM (ref 140–440)
PMV BLD AUTO: 9.7 FL (ref 7.5–11.1)
POTASSIUM SERPL-SCNC: 4 MMOL/L (ref 3.5–5.1)
RBC # BLD: 4.63 M/CU MM (ref 4.2–5.4)
SODIUM BLD-SCNC: 140 MMOL/L (ref 135–145)
WBC # BLD: 7 K/CU MM (ref 4–10.5)

## 2020-08-14 PROCEDURE — 6360000002 HC RX W HCPCS: Performed by: ANESTHESIOLOGY

## 2020-08-14 PROCEDURE — 7100000010 HC PHASE II RECOVERY - FIRST 15 MIN: Performed by: SURGERY

## 2020-08-14 PROCEDURE — 7100000001 HC PACU RECOVERY - ADDTL 15 MIN: Performed by: SURGERY

## 2020-08-14 PROCEDURE — 7100000000 HC PACU RECOVERY - FIRST 15 MIN: Performed by: SURGERY

## 2020-08-14 PROCEDURE — 3600000004 HC SURGERY LEVEL 4 BASE: Performed by: SURGERY

## 2020-08-14 PROCEDURE — 80048 BASIC METABOLIC PNL TOTAL CA: CPT

## 2020-08-14 PROCEDURE — 47562 LAPAROSCOPIC CHOLECYSTECTOMY: CPT | Performed by: SURGERY

## 2020-08-14 PROCEDURE — 2580000003 HC RX 258: Performed by: SURGERY

## 2020-08-14 PROCEDURE — 2500000003 HC RX 250 WO HCPCS: Performed by: SURGERY

## 2020-08-14 PROCEDURE — 2720000010 HC SURG SUPPLY STERILE: Performed by: SURGERY

## 2020-08-14 PROCEDURE — 2709999900 HC NON-CHARGEABLE SUPPLY: Performed by: SURGERY

## 2020-08-14 PROCEDURE — 3600000014 HC SURGERY LEVEL 4 ADDTL 15MIN: Performed by: SURGERY

## 2020-08-14 PROCEDURE — 2580000003 HC RX 258: Performed by: ANESTHESIOLOGY

## 2020-08-14 PROCEDURE — 6370000000 HC RX 637 (ALT 250 FOR IP): Performed by: SURGERY

## 2020-08-14 PROCEDURE — 6360000002 HC RX W HCPCS: Performed by: SURGERY

## 2020-08-14 PROCEDURE — 88304 TISSUE EXAM BY PATHOLOGIST: CPT | Performed by: PATHOLOGY

## 2020-08-14 PROCEDURE — 85027 COMPLETE CBC AUTOMATED: CPT

## 2020-08-14 PROCEDURE — 3700000001 HC ADD 15 MINUTES (ANESTHESIA): Performed by: SURGERY

## 2020-08-14 PROCEDURE — 2500000003 HC RX 250 WO HCPCS: Performed by: NURSE ANESTHETIST, CERTIFIED REGISTERED

## 2020-08-14 PROCEDURE — 6360000002 HC RX W HCPCS: Performed by: NURSE ANESTHETIST, CERTIFIED REGISTERED

## 2020-08-14 PROCEDURE — 7100000011 HC PHASE II RECOVERY - ADDTL 15 MIN: Performed by: SURGERY

## 2020-08-14 PROCEDURE — 3700000000 HC ANESTHESIA ATTENDED CARE: Performed by: SURGERY

## 2020-08-14 PROCEDURE — 2780000010 HC IMPLANT OTHER: Performed by: SURGERY

## 2020-08-14 RX ORDER — ONDANSETRON 2 MG/ML
4 INJECTION INTRAMUSCULAR; INTRAVENOUS
Status: DISCONTINUED | OUTPATIENT
Start: 2020-08-14 | End: 2020-08-14 | Stop reason: HOSPADM

## 2020-08-14 RX ORDER — KETOROLAC TROMETHAMINE 30 MG/ML
INJECTION, SOLUTION INTRAMUSCULAR; INTRAVENOUS PRN
Status: DISCONTINUED | OUTPATIENT
Start: 2020-08-14 | End: 2020-08-14 | Stop reason: SDUPTHER

## 2020-08-14 RX ORDER — HEPARIN SODIUM 5000 [USP'U]/ML
5000 INJECTION, SOLUTION INTRAVENOUS; SUBCUTANEOUS ONCE
Status: COMPLETED | OUTPATIENT
Start: 2020-08-14 | End: 2020-08-14

## 2020-08-14 RX ORDER — ROCURONIUM BROMIDE 10 MG/ML
INJECTION, SOLUTION INTRAVENOUS PRN
Status: DISCONTINUED | OUTPATIENT
Start: 2020-08-14 | End: 2020-08-14 | Stop reason: SDUPTHER

## 2020-08-14 RX ORDER — GLYCOPYRROLATE 0.2 MG/ML
INJECTION INTRAMUSCULAR; INTRAVENOUS PRN
Status: DISCONTINUED | OUTPATIENT
Start: 2020-08-14 | End: 2020-08-14 | Stop reason: SDUPTHER

## 2020-08-14 RX ORDER — HYDRALAZINE HYDROCHLORIDE 20 MG/ML
5 INJECTION INTRAMUSCULAR; INTRAVENOUS EVERY 10 MIN PRN
Status: DISCONTINUED | OUTPATIENT
Start: 2020-08-14 | End: 2020-08-14 | Stop reason: HOSPADM

## 2020-08-14 RX ORDER — SODIUM CHLORIDE, SODIUM LACTATE, POTASSIUM CHLORIDE, CALCIUM CHLORIDE 600; 310; 30; 20 MG/100ML; MG/100ML; MG/100ML; MG/100ML
INJECTION, SOLUTION INTRAVENOUS CONTINUOUS
Status: DISCONTINUED | OUTPATIENT
Start: 2020-08-14 | End: 2020-08-14 | Stop reason: HOSPADM

## 2020-08-14 RX ORDER — FENTANYL CITRATE 50 UG/ML
50 INJECTION, SOLUTION INTRAMUSCULAR; INTRAVENOUS EVERY 5 MIN PRN
Status: DISCONTINUED | OUTPATIENT
Start: 2020-08-14 | End: 2020-08-14 | Stop reason: HOSPADM

## 2020-08-14 RX ORDER — FENTANYL CITRATE 50 UG/ML
25 INJECTION, SOLUTION INTRAMUSCULAR; INTRAVENOUS EVERY 5 MIN PRN
Status: DISCONTINUED | OUTPATIENT
Start: 2020-08-14 | End: 2020-08-14 | Stop reason: HOSPADM

## 2020-08-14 RX ORDER — MIDAZOLAM HYDROCHLORIDE 1 MG/ML
INJECTION INTRAMUSCULAR; INTRAVENOUS PRN
Status: DISCONTINUED | OUTPATIENT
Start: 2020-08-14 | End: 2020-08-14 | Stop reason: SDUPTHER

## 2020-08-14 RX ORDER — AMOXICILLIN 250 MG
2 CAPSULE ORAL DAILY
Qty: 60 TABLET | Refills: 3 | Status: SHIPPED | OUTPATIENT
Start: 2020-08-14 | End: 2020-08-24

## 2020-08-14 RX ORDER — OXYCODONE HYDROCHLORIDE AND ACETAMINOPHEN 5; 325 MG/1; MG/1
1 TABLET ORAL ONCE
Status: DISCONTINUED | OUTPATIENT
Start: 2020-08-14 | End: 2020-08-14 | Stop reason: HOSPADM

## 2020-08-14 RX ORDER — PROPOFOL 10 MG/ML
INJECTION, EMULSION INTRAVENOUS PRN
Status: DISCONTINUED | OUTPATIENT
Start: 2020-08-14 | End: 2020-08-14 | Stop reason: SDUPTHER

## 2020-08-14 RX ORDER — ONDANSETRON 2 MG/ML
INJECTION INTRAMUSCULAR; INTRAVENOUS PRN
Status: DISCONTINUED | OUTPATIENT
Start: 2020-08-14 | End: 2020-08-14 | Stop reason: SDUPTHER

## 2020-08-14 RX ORDER — OXYCODONE HYDROCHLORIDE AND ACETAMINOPHEN 5; 325 MG/1; MG/1
1-2 TABLET ORAL EVERY 6 HOURS PRN
Qty: 35 TABLET | Refills: 0 | Status: SHIPPED | OUTPATIENT
Start: 2020-08-14 | End: 2020-08-21

## 2020-08-14 RX ORDER — DEXAMETHASONE SODIUM PHOSPHATE 4 MG/ML
INJECTION, SOLUTION INTRA-ARTICULAR; INTRALESIONAL; INTRAMUSCULAR; INTRAVENOUS; SOFT TISSUE PRN
Status: DISCONTINUED | OUTPATIENT
Start: 2020-08-14 | End: 2020-08-14 | Stop reason: SDUPTHER

## 2020-08-14 RX ORDER — LIDOCAINE HYDROCHLORIDE 20 MG/ML
INJECTION, SOLUTION INTRAVENOUS PRN
Status: DISCONTINUED | OUTPATIENT
Start: 2020-08-14 | End: 2020-08-14 | Stop reason: SDUPTHER

## 2020-08-14 RX ORDER — LABETALOL HYDROCHLORIDE 5 MG/ML
5 INJECTION, SOLUTION INTRAVENOUS EVERY 10 MIN PRN
Status: DISCONTINUED | OUTPATIENT
Start: 2020-08-14 | End: 2020-08-14 | Stop reason: HOSPADM

## 2020-08-14 RX ORDER — FENTANYL CITRATE 50 UG/ML
INJECTION, SOLUTION INTRAMUSCULAR; INTRAVENOUS PRN
Status: DISCONTINUED | OUTPATIENT
Start: 2020-08-14 | End: 2020-08-14 | Stop reason: SDUPTHER

## 2020-08-14 RX ADMIN — FENTANYL CITRATE 25 MCG: 50 INJECTION, SOLUTION INTRAMUSCULAR; INTRAVENOUS at 12:24

## 2020-08-14 RX ADMIN — LIDOCAINE HYDROCHLORIDE 100 MG: 20 INJECTION, SOLUTION INTRAVENOUS at 10:33

## 2020-08-14 RX ADMIN — DEXAMETHASONE SODIUM PHOSPHATE 4 MG: 4 INJECTION, SOLUTION INTRAMUSCULAR; INTRAVENOUS at 10:40

## 2020-08-14 RX ADMIN — FENTANYL CITRATE 25 MCG: 50 INJECTION, SOLUTION INTRAMUSCULAR; INTRAVENOUS at 12:06

## 2020-08-14 RX ADMIN — SODIUM CHLORIDE, POTASSIUM CHLORIDE, SODIUM LACTATE AND CALCIUM CHLORIDE: 600; 310; 30; 20 INJECTION, SOLUTION INTRAVENOUS at 07:56

## 2020-08-14 RX ADMIN — ROCURONIUM BROMIDE 50 MG: 10 INJECTION INTRAVENOUS at 10:33

## 2020-08-14 RX ADMIN — CHLORHEXIDINE GLUCONATE: 4 LIQUID TOPICAL at 08:04

## 2020-08-14 RX ADMIN — MIDAZOLAM 2 MG: 1 INJECTION INTRAMUSCULAR; INTRAVENOUS at 10:23

## 2020-08-14 RX ADMIN — SODIUM CHLORIDE, POTASSIUM CHLORIDE, SODIUM LACTATE AND CALCIUM CHLORIDE: 600; 310; 30; 20 INJECTION, SOLUTION INTRAVENOUS at 11:41

## 2020-08-14 RX ADMIN — FENTANYL CITRATE 25 MCG: 50 INJECTION, SOLUTION INTRAMUSCULAR; INTRAVENOUS at 12:35

## 2020-08-14 RX ADMIN — SODIUM CHLORIDE, POTASSIUM CHLORIDE, SODIUM LACTATE AND CALCIUM CHLORIDE: 600; 310; 30; 20 INJECTION, SOLUTION INTRAVENOUS at 10:19

## 2020-08-14 RX ADMIN — HEPARIN SODIUM 5000 UNITS: 5000 INJECTION, SOLUTION INTRAVENOUS; SUBCUTANEOUS at 08:01

## 2020-08-14 RX ADMIN — PROPOFOL 200 MG: 10 INJECTION, EMULSION INTRAVENOUS at 10:33

## 2020-08-14 RX ADMIN — GLYCOPYRROLATE 0.2 MG: 0.2 INJECTION, SOLUTION INTRAMUSCULAR; INTRAVENOUS at 11:05

## 2020-08-14 RX ADMIN — CEFAZOLIN SODIUM 3 G: 1 INJECTION, POWDER, FOR SOLUTION INTRAMUSCULAR; INTRAVENOUS at 10:40

## 2020-08-14 RX ADMIN — FENTANYL CITRATE 50 MCG: 50 INJECTION INTRAMUSCULAR; INTRAVENOUS at 11:52

## 2020-08-14 RX ADMIN — KETOROLAC TROMETHAMINE 30 MG: 30 INJECTION, SOLUTION INTRAMUSCULAR; INTRAVENOUS at 11:25

## 2020-08-14 RX ADMIN — ROCURONIUM BROMIDE 20 MG: 10 INJECTION INTRAVENOUS at 10:56

## 2020-08-14 RX ADMIN — FENTANYL CITRATE 100 MCG: 50 INJECTION INTRAMUSCULAR; INTRAVENOUS at 10:53

## 2020-08-14 RX ADMIN — FENTANYL CITRATE 100 MCG: 50 INJECTION INTRAMUSCULAR; INTRAVENOUS at 10:31

## 2020-08-14 RX ADMIN — SUGAMMADEX 200 MG: 100 INJECTION, SOLUTION INTRAVENOUS at 11:31

## 2020-08-14 RX ADMIN — ONDANSETRON 4 MG: 2 INJECTION INTRAMUSCULAR; INTRAVENOUS at 11:23

## 2020-08-14 RX ADMIN — FENTANYL CITRATE 50 MCG: 50 INJECTION INTRAMUSCULAR; INTRAVENOUS at 11:42

## 2020-08-14 ASSESSMENT — PAIN DESCRIPTION - LOCATION
LOCATION: ABDOMEN

## 2020-08-14 ASSESSMENT — PAIN DESCRIPTION - PAIN TYPE
TYPE: SURGICAL PAIN

## 2020-08-14 ASSESSMENT — PULMONARY FUNCTION TESTS
PIF_VALUE: 33
PIF_VALUE: 0
PIF_VALUE: 0
PIF_VALUE: 31
PIF_VALUE: 37
PIF_VALUE: 0
PIF_VALUE: 30
PIF_VALUE: 29
PIF_VALUE: 0
PIF_VALUE: 32
PIF_VALUE: 34
PIF_VALUE: 9
PIF_VALUE: 0
PIF_VALUE: 34
PIF_VALUE: 33
PIF_VALUE: 28
PIF_VALUE: 29
PIF_VALUE: 28
PIF_VALUE: 1
PIF_VALUE: 28
PIF_VALUE: 30
PIF_VALUE: 28
PIF_VALUE: 0
PIF_VALUE: 34
PIF_VALUE: 22
PIF_VALUE: 34
PIF_VALUE: 35
PIF_VALUE: 28
PIF_VALUE: 33
PIF_VALUE: 26
PIF_VALUE: 29
PIF_VALUE: 0
PIF_VALUE: 30
PIF_VALUE: 34
PIF_VALUE: 36
PIF_VALUE: 29
PIF_VALUE: 34
PIF_VALUE: 6
PIF_VALUE: 30
PIF_VALUE: 32
PIF_VALUE: 13
PIF_VALUE: 28
PIF_VALUE: 35
PIF_VALUE: 30
PIF_VALUE: 2
PIF_VALUE: 0
PIF_VALUE: 1
PIF_VALUE: 10
PIF_VALUE: 30
PIF_VALUE: 34
PIF_VALUE: 28
PIF_VALUE: 28
PIF_VALUE: 30
PIF_VALUE: 0
PIF_VALUE: 33
PIF_VALUE: 11
PIF_VALUE: 38
PIF_VALUE: 28
PIF_VALUE: 19
PIF_VALUE: 29
PIF_VALUE: 28
PIF_VALUE: 33
PIF_VALUE: 30
PIF_VALUE: 1
PIF_VALUE: 36
PIF_VALUE: 2
PIF_VALUE: 29
PIF_VALUE: 34
PIF_VALUE: 33
PIF_VALUE: 1
PIF_VALUE: 29
PIF_VALUE: 0
PIF_VALUE: 32
PIF_VALUE: 1
PIF_VALUE: 30
PIF_VALUE: 32
PIF_VALUE: 2
PIF_VALUE: 32
PIF_VALUE: 33
PIF_VALUE: 0
PIF_VALUE: 36
PIF_VALUE: 29
PIF_VALUE: 33
PIF_VALUE: 0
PIF_VALUE: 32
PIF_VALUE: 32
PIF_VALUE: 30
PIF_VALUE: 34
PIF_VALUE: 32

## 2020-08-14 ASSESSMENT — PAIN SCALES - GENERAL
PAINLEVEL_OUTOF10: 6
PAINLEVEL_OUTOF10: 6
PAINLEVEL_OUTOF10: 7
PAINLEVEL_OUTOF10: 5

## 2020-08-14 ASSESSMENT — PAIN DESCRIPTION - DESCRIPTORS
DESCRIPTORS: DISCOMFORT

## 2020-08-14 ASSESSMENT — PAIN - FUNCTIONAL ASSESSMENT: PAIN_FUNCTIONAL_ASSESSMENT: 0-10

## 2020-08-14 ASSESSMENT — PAIN DESCRIPTION - ORIENTATION
ORIENTATION: MID

## 2020-08-14 ASSESSMENT — LIFESTYLE VARIABLES: SMOKING_STATUS: 0

## 2020-08-14 ASSESSMENT — PAIN DESCRIPTION - PROGRESSION: CLINICAL_PROGRESSION: GRADUALLY WORSENING

## 2020-08-14 ASSESSMENT — PAIN DESCRIPTION - ONSET: ONSET: ON-GOING

## 2020-08-14 NOTE — OP NOTE
OPERATIVE REPORT      Werner Carey 1990, 34 y.o.,  female, CSN: [id number]  August 14, 2020    Preoperative diagnosis: Symptomatic chronic acalculous cholecystitis - gall bladder dyskinesia GB EF 4%. Postoperative diagnosis: Same. Procedure: Laparoscopic cholecystectomy. Surgeon: Roselia Morris MD, FACS, FICS  Assistant Surgeon:  Logan Javier  Anesthesia: General Endotracheal Anesthesia + Local 1% Xylocaine With Epinephrine, 0.5% Marcaine, mixed, 50% : 50%. Specimen(s): Gall Bladder was sent to permanent pathology. Estimated Blood Loss: Less than 5 ml. Drain: None. Complications: None. Condition/Disposition:  Stable, Extubated, to PACU    Indications: This 34y.o.-year-old female developed right upper quadrant pain/ nausea/ vomiting/ fever/ leukocytosis and on workup was found to have cholelithiasis with a normal common duct/ cholecystitis. Laparoscopic cholecystectomy was elected. After informed consent was signed, knowing the risks and benefits, possible alternatives and potential complications of the procedure including but not limited to: bleeding, infection, incisional hernia(s), injury to the common bile duct or right hepatic duct, bile leak, subhepatic abscess, retained common bile duct stones, bowel injury, need for interventional radiology procedures like drain placement or gastro-enterology procedure like ERCP/stent placements, and possibly the need for further surgeries. Patient is well aware of the rare yet possible need for conversion to open cholecystectomy  if conditions are not favorable. Description of procedure: The patient was appropriately identified in the holding area. Appropriate IV antibiotic was given on call to OR (operating room). Hibiclens abdominal skin prep was performed in the holding area and barbie hose and sequential compression devices were placed on the lower extremities bilaterally and activated.   Pt voided her urinary bladder on call to the other: in the epigastrium, midclavicular line and anterior axillary line. The table was then placed in the reverse Trendelenburg position with the right side up. Filmy adhesions between the gallbladder and omentum/ duodenum/ transverse colon were lysed sharply. The fundus of the gallbladder was grasped with an atraumatic grasper passed through the lateral-most port and retracted superiorly and laterally. The  Matthews's pouch (infundibulum of the gall bladder) was also grasped with an atraumatic grasper through the midclavicular port and retracted laterally. These maneuvers exposed Calots triangle nicely. The anterior, and then the posterior peritoneum overlying the gallbladder's infundibulum were then incised meticulously and the cystic duct and cystic artery were both clearly identified and circumferentially skeletonized with gentle dissection. The cystic duct was triply clipped and sharply divided close to the gallbladder, leaving 2 clips on the common bile duct side and one on the gall bladder side of the cystic duct. Likewise, the cystic artery was triply clipped and divided using electrocautery, leaving 2 clips on the hepatic artery side and one on the gall bladder side of the cystic artery. The gallbladder was then dissected from its peritoneal attachments and from its gall bladder liver bed using the hook electrocautery. Hemostasis was secured all along the dissection and the gallbladder was retrieved in its entirety using the endo-catch bag (the endoscopic retrieval bag) introduced through the umbilical port after switching the camera to the epigastric / subxiphoid port . The gallbladder was passed off the table as a specimen after it was palpated for any neoplastic abnormalities, and none were noted. The gallbladder fossa was re-inspected, and irrigated as needed with saline and perfectly adequate hemostasis was noted.  There was no evidence of bleeding from the gallbladder fossa or cystic artery or leakage of the bile from the cystic duct stump or the liver bed. The orogastric tube was removed uneventfully   All three subcostal trocars were removed under direct visualization without any evidence of port site bleeding. The laparoscope was withdrawn and the umbilical trocar removed. The pneumoperitoneum was evacuated. The pre-placed 0-Vicryl suture was tied to approximate the fascial defect of the 12-mm trocar site. Further more local anesthetic was injected to all trocar sites. The skin was then  approximated using 4-0 Vicryl in a subcuticular fashion, followed by the application of \"Dermabond\" /  topical skin adhesive. The patient tolerated the procedure very well without any complications, was extubated in the OR and was taken to the PACU (postanesthesia care unit) in stable condition.     ___________________________________________    Sunny Curry MD, FACS, FICS  8/14/2020  11:36 AM

## 2020-08-14 NOTE — H&P
HISTORY AND PHYSICAL EXAM    Date of Admission:  8/14/2020    PCP:  Wayne Palacio, DO    Chief Complaint / History of Present Illness:  Gena Hansen is a very pleasant 34 y.o. female who presents with pain in the Right Upper Quadrant and is chronic, worsening and dull compared to patient's normal condition. It is moderate in intensity for a duration of 6 months and is intermittent with modifying factors increased by or worse with fatty food intake. Low gallbladder ejection fraction of 4%.  This finding is compatible with    biliary dyskinesia which can be seen with chronic cholecystitis         Continue NPO/Bowel rest, IV Fluids, Pain control, Nausea control, IV Antibiotics, and will proceed today with lap wilbur. PMH:   has a past medical history of Biliary dyskinesia, Chronic foot pain, right, Depression, Follicular thyroid cancer ([address] (dx 2019), GERD (gastroesophageal reflux disease), Hypothyroidism, Migraines, PCOS (polycystic ovarian syndrome), and Wears glasses. PSH:   has a past surgical history that includes Dilation and curettage of uterus (08/01/2018); ovarian cyst removal (Right, 08/01/2018); Thyroidectomy (Left, 8/8/2019); Thyroidectomy (Right, 8/26/2019); and Foot neuroma surgery (Right, 7/24/2020). Allergies: Allergies   Allergen Reactions    Cinnamon Flavor Anaphylaxis     \"allergic to cinnamon candy and cinnamon extract\"\"hives up an down my throat    Codeine Swelling    Pcn [Penicillins] Other (See Comments)     \"had black bruises \"    Cheese      Gi upset        Home Meds:    Prior to Admission medications    Medication Sig Start Date End Date Taking?  Authorizing Provider   levothyroxine (SYNTHROID) 175 MCG tablet Take 175 mcg by mouth Daily    Historical Provider, MD   escitalopram (LEXAPRO) 10 MG tablet Take 1 tablet by mouth daily 7/2/20   Wayne Palacio, DO   pantoprazole (PROTONIX) 40 MG tablet Take 40 mg by mouth nightly     Historical Provider, MD Sunday Scott FE 1/20 1-20 MG-MCG per tablet Take 1 tablet by mouth daily 2/28/20   Amie Galvin DO   calcium carbonate 648 MG TABS Take 2 tablets by mouth 2 times daily 8/27/19 8/26/20  Kecia Quinn MD        [facility] Meds:    No current facility-administered medications for this encounter. 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[employer])   Social Needs    Financial resource strain: None    Food insecurity     Worry: None     Inability: None    Transportation needs     Medical: None     Non-medical: None   Tobacco Use    Smoking status: Never Smoker    Smokeless tobacco: Never Used   Substance and Sexual Activity    Alcohol use: Yes     Comment: Rarely - 1 x year    Drug use: Never    Sexual activity: Not Currently   Lifestyle    Physical activity     Days per week: None     Minutes per session: None    Stress: None   Relationships    Social connections     Talks on phone: None     Gets together: None     Attends Baptism service: None     Active member of club or organization: None     Attends meetings of clubs or organizations: None     Relationship status: None    Intimate partner violence     Fear of current or ex partner: None     Emotionally abused: None     Physically abused: None     Forced sexual activity: None   Other Topics Concern    None   Social History Narrative    None      Family History   Problem Relation Age of Onset    Cancer Mother         Breast    Arthritis Mother     Hypertension Father     COPD Father     High Blood Pressure Father     Asthma Father         copd    Hypertension Sister     Diabetes Other     otherwise irrelevant to this surgical issue.     Review of Systems:  Constitutional: Negative for fever, chills, diaphoresis, proceed today with lap wilbur. Labs:    No results found for this or any previous visit (from the past 24 hour(s)). Diagnosis:  Patient Active Problem List   Diagnosis    Follicular neoplasm of thyroid    Follicular adenoma of thyroid gland    Follicular carcinoma ([address]    Primary thyroid follicular carcinoma (HCC)    Chronic cholecystitis           Assessment & Plan:    Anil Harrell is a very pleasant 34 y.o. female who presents with pain in the Right Upper Quadrant and is chronic, worsening and dull compared to patient's normal condition. It is moderate in intensity for a duration of 6 months and is intermittent with modifying factors increased by or worse with fatty food intake.     Low gallbladder ejection fraction of 4%.  This finding is compatible with    biliary dyskinesia which can be seen with chronic cholecystitis         Continue NPO/Bowel rest, IV Fluids, Pain control, Nausea control, IV Antibiotics, and will proceed today with lap wilbur.    ____________________________________________    Asia Ruffin MD, FACS, FICS    8/14/2020  6:39 AM

## 2020-08-14 NOTE — PROGRESS NOTES
Patient discharge instructions and prescriptions reviewed and verified. RN reviewed d/c instructions with patient and friend All questions answered. Prescriptions called into patients pharmacy as listed on discharge instructions, patient agrees this is correct pharmacy. Discharge paperwork signed by RN and patients friend. Armband removed.

## 2020-08-14 NOTE — PROGRESS NOTES
Patient up to bathroom x2 RN assist, patient able to urinae without difficulty, patient assisted back into bed in room, will proceed with discharge.

## 2020-08-14 NOTE — ANESTHESIA POSTPROCEDURE EVALUATION
Department of Anesthesiology  Postprocedure Note    Patient: Abdirizak Da Silva  MRN: [id number]  YOB: 1990  Date of evaluation: 8/14/2020  Time:  11:55 AM     Procedure Summary     Date:  08/14/20 Room / Location:  [address]    Anesthesia Start:  1024 Anesthesia Stop:  4577    Procedure:  CHOLECYSTECTOMY LAPAROSCOPIC (N/A Abdomen) Diagnosis:  (CHRONIC CHOLECYSTITIS)    Surgeon:  Lauryn Gonzales MD Responsible Provider:  CAM Victoria CRNA    Anesthesia Type:  general ASA Status:  3          Anesthesia Type: general    Fabby Phase I: Fabby Score: 8    Fabby Phase II:      Last vitals: Reviewed and per EMR flowsheets.        Anesthesia Post Evaluation    Patient location during evaluation: PACU  Patient participation: complete - patient participated  Level of consciousness: awake and alert  Pain score: 4  Airway patency: patent  Nausea & Vomiting: no vomiting and no nausea  Complications: no  Cardiovascular status: blood pressure returned to baseline and hemodynamically stable  Respiratory status: acceptable, spontaneous ventilation, nonlabored ventilation and nasal cannula  Hydration status: stable

## 2020-08-14 NOTE — PROGRESS NOTES
notified of patients c/o pain, new orders received. RN to carry put orders    Patient states she has taken Percocet in the past and had no problems.

## 2020-08-14 NOTE — PROGRESS NOTES
1149 patient received from the OR monitor placed and alarms on.  Report received from [address].  4194 turned and extra linen removed tolerated without difficulty   1230 complains of discomfort in her chest and shoulders educated patient about possible gas pains post op she voiced understanding   [id number] noted to be resting with her eyes closed and when she arouse states her pain is a 7 on a scale of 0 to 10 then noted to fall back to sleep   1254 transferred back to same day surgery via cart to room 23 Report given to cafegive

## 2020-08-28 ENCOUNTER — TELEMEDICINE (OUTPATIENT)
Dept: BARIATRICS/WEIGHT MGMT | Age: 30
End: 2020-08-28

## 2020-08-28 PROBLEM — Z90.49 STATUS POST LAPAROSCOPIC CHOLECYSTECTOMY: Status: ACTIVE | Noted: 2020-08-28

## 2020-08-28 PROCEDURE — 99024 POSTOP FOLLOW-UP VISIT: CPT | Performed by: SURGERY

## 2020-08-28 NOTE — PROGRESS NOTES
2020    TELEHEALTH EVALUATION -- Audio/Visual (During VQKNA-09 public health emergency)    HPI:    Mayelin Schmitz (:  1990) has requested an audio/video evaluation for the following concern(s):    Doing very well. Review of Systems    Prior to Visit Medications    Medication Sig Taking?  Authorizing Provider   levothyroxine (SYNTHROID) 175 MCG tablet Take 175 mcg by mouth Daily  Historical Provider, MD   escitalopram (LEXAPRO) 10 MG tablet Take 1 tablet by mouth daily  Mona Phillips DO   pantoprazole (PROTONIX) 40 MG tablet Take 40 mg by mouth nightly   Historical Provider, MD   BLISOVI FE  1-20 MG-MCG per tablet Take 1 tablet by mouth daily  Mona Phillips, DO       Social History     Tobacco Use    Smoking status: Never Smoker    Smokeless tobacco: Never Used   Substance Use Topics    Alcohol use: Yes     Comment: Rarely - 1 x year    Drug use: Never        Allergies   Allergen Reactions    Cinnamon Flavor Anaphylaxis     \"allergic to cinnamon candy and cinnamon extract\"\"hives up an down my throat    Codeine Swelling    Pcn [Penicillins] Other (See Comments)     \"had black bruises \"    Cheese      Gi upset   ,   Past Medical History:   Diagnosis Date    Biliary dyskinesia     Chronic foot pain, right     Depression     Takes Lexapro    Follicular thyroid cancer ([address] dx 2019    Surgery\"also tx with radioactive iodine\" follow with dr Kaley Osorio GERD (gastroesophageal reflux disease)     Hypothyroidism     managed per Dr. Kaley Osorio Migraines     Last migraine: 19    PCOS (polycystic ovarian syndrome)     Takes BC    Wears glasses    ,   Past Surgical History:   Procedure Laterality Date    CHOLECYSTECTOMY, LAPAROSCOPIC N/A 2020    CHOLECYSTECTOMY LAPAROSCOPIC performed by Josefina Beyer MD at [address] [place] [address]  2018    FOOT NEUROMA SURGERY Right 2020    RIGHT FOOT NEUROMA EXCISION performed by Dinora Meyers DPM at [address] [place] [address] CYST REMOVAL Right 08/01/2018    done with D & C    THYROIDECTOMY Left 8/8/2019    LEFT THYROID[address] Claudy Adair MD [address] Right 8/26/2019    RIGHT COMPLETION THYROIDECTOMY performed by Claudy Adair MD at [address]   ,   Social History     Tobacco Use    Smoking status: Never Smoker    Smokeless tobacco: Never Used   Substance Use Topics    Alcohol use: Yes     Comment: Rarely - 1 x year    Drug use: Never   ,   Family History   Problem Relation Age of Onset    Cancer Mother         Breast    Arthritis Mother     Hypertension Father     COPD Father     High Blood Pressure Father     Asthma Father         copd    Hypertension Sister     Diabetes Other    ,   Immunization History   Administered Date(s) Administered    Tdap (Boostrix, Adacel) 05/23/2019   ,   Health Maintenance   Topic Date Due    Varicella vaccine (1 of 2 - 2-dose childhood series) 09/22/1991    HIV screen  09/22/2005    Flu vaccine (1) 09/01/2020    Cervical cancer screen  04/04/2022    DTaP/Tdap/Td vaccine (7 - Td) 05/23/2029    Hib vaccine  Completed    Hepatitis A vaccine  Aged Out    Hepatitis B vaccine  Aged Out    Meningococcal (ACWY) vaccine  Aged Out    Pneumococcal 0-64 years Vaccine  Aged Out       PHYSICAL EXAMINATION:  [ INSTRUCTIONS:  \"[x]\" Indicates a positive item  \"[]\" Indicates a negative item  -- DELETE ALL ITEMS NOT EXAMINED]  Vital Signs: (As obtained by patient/caregiver or practitioner observation)    Blood pressure-  Heart rate-    Respiratory rate-    Temperature-  Pulse oximetry-     Constitutional: [x] Appears well-developed and well-nourished [x] No apparent distress      [] Abnormal-   Mental status  [x] Alert and awake  [x] Oriented to person/place/time [x]Able to follow commands      Eyes:  EOM    [x]  Normal  [] Abnormal-  Sclera  [x]  Normal  [] Abnormal -         Discharge [x]  None visible  [] Abnormal -    HENT:   [x] Normocephalic, atraumatic.   [] Abnormal   [x] Mouth/Throat: Mucous membranes are moist.     External Ears [x] Normal  [] Abnormal-     Neck: [x] No visualized mass     Pulmonary/Chest: [x] Respiratory effort normal.  [x] No visualized signs of difficulty breathing or respiratory distress        [] Abnormal-      Musculoskeletal:   [x] Normal gait with no signs of ataxia         [x] Normal range of motion of neck        [] Abnormal-       Neurological:        [x] No Facial Asymmetry (Cranial nerve 7 motor function) (limited exam to video visit)          [x] No gaze palsy        [] Abnormal-         Skin:        [x] No significant exanthematous lesions or discoloration noted on facial skin         [] Abnormal-            Psychiatric:       [x] Normal Affect [x] No Hallucinations        [] Abnormal-     Other pertinent observable physical exam findings-     ASSESSMENT/PLAN:    1. Status post laparoscopic cholecystectomy. No diarrhea, just the first couple of days. Did not loose weight. .    August 14, 2020     Preoperative diagnosis: Symptomatic chronic acalculous cholecystitis - gall bladder dyskinesia GB EF 4%. Postoperative diagnosis: Same. Procedure: Laparoscopic cholecystectomy. Final Pathologic Diagnosis:   Gallbladder; cholecystectomy:   -     Chronic cholecystitis.     -     Cholesterolosis. Return for PRN - As needed for any new symptom. Stan Parker is a 34 y.o. female being evaluated by a Virtual Visit (video visit) encounter to address concerns as mentioned above. A caregiver was present when appropriate. Due to this being a TeleHealth encounter (During CUQIX-62 public health emergency), evaluation of the following organ systems was limited: Vitals/Constitutional/EENT/Resp/CV/GI//MS/Neuro/Skin/Heme-Lymph-Imm.   Pursuant to the emergency declaration under the [address] [place] [address] and the [employer] and [employer] Act, this Virtual

## 2020-09-18 ENCOUNTER — PATIENT MESSAGE (OUTPATIENT)
Dept: INTERNAL MEDICINE CLINIC | Age: 30
End: 2020-09-18

## 2020-09-18 RX ORDER — NORETHINDRONE ACETATE AND ETHINYL ESTRADIOL 1MG-20(21)
1 KIT ORAL DAILY
Qty: 1 PACKET | Refills: 2 | Status: SHIPPED | OUTPATIENT
Start: 2020-09-18

## 2020-09-18 NOTE — TELEPHONE ENCOUNTER
From: Dany Salinas  To: Raquel Norwood DO  Sent: 9/18/2020 9:25 AM EDT  Subject: Prescription Question    I was able to get a new patient appointment with a Gyno. My appointment is NOV 6 (the earliest they had). Do you mind refilling my Birth Control to get me through to there? I have 3 weeks left on my current pack with no refills.     Thank you

## 2020-09-30 ENCOUNTER — APPOINTMENT (OUTPATIENT)
Dept: GENERAL RADIOLOGY | Age: 30
End: 2020-09-30
Payer: COMMERCIAL

## 2020-09-30 ENCOUNTER — HOSPITAL ENCOUNTER (EMERGENCY)
Age: 30
Discharge: HOME | End: 2020-09-30
Attending: EMERGENCY MEDICINE
Payer: COMMERCIAL

## 2020-09-30 VITALS
HEIGHT: 64 IN | TEMPERATURE: 98 F | WEIGHT: 293 LBS | HEART RATE: 65 BPM | OXYGEN SATURATION: 98 % | BODY MASS INDEX: 50.02 KG/M2 | RESPIRATION RATE: 21 BRPM | DIASTOLIC BLOOD PRESSURE: 92 MMHG | SYSTOLIC BLOOD PRESSURE: 140 MMHG

## 2020-09-30 LAB
ALBUMIN SERPL-MCNC: 3.6 GM/DL (ref 3.4–5)
ALP BLD-CCNC: 90 IU/L (ref 40–128)
ALT SERPL-CCNC: 12 U/L (ref 10–40)
ANION GAP SERPL CALCULATED.3IONS-SCNC: 9 MMOL/L (ref 4–16)
AST SERPL-CCNC: 14 IU/L (ref 15–37)
BASOPHILS ABSOLUTE: 0.1 K/CU MM
BASOPHILS RELATIVE PERCENT: 0.5 % (ref 0–1)
BILIRUB SERPL-MCNC: 0.2 MG/DL (ref 0–1)
BUN BLDV-MCNC: 8 MG/DL (ref 6–23)
CALCIUM SERPL-MCNC: 8.5 MG/DL (ref 8.3–10.6)
CHLORIDE BLD-SCNC: 103 MMOL/L (ref 99–110)
CO2: 27 MMOL/L (ref 21–32)
CREAT SERPL-MCNC: 0.9 MG/DL (ref 0.6–1.1)
D DIMER: <200 NG/ML(DDU)
DIFFERENTIAL TYPE: ABNORMAL
EOSINOPHILS ABSOLUTE: 0.1 K/CU MM
EOSINOPHILS RELATIVE PERCENT: 1.4 % (ref 0–3)
GFR AFRICAN AMERICAN: >60 ML/MIN/1.73M2
GFR NON-AFRICAN AMERICAN: >60 ML/MIN/1.73M2
GLUCOSE BLD-MCNC: 94 MG/DL (ref 70–99)
HCG QUALITATIVE: NEGATIVE
HCT VFR BLD CALC: 41.2 % (ref 37–47)
HEMOGLOBIN: 13.6 GM/DL (ref 12.5–16)
IMMATURE NEUTROPHIL %: 0.4 % (ref 0–0.43)
LIPASE: 25 IU/L (ref 13–60)
LYMPHOCYTES ABSOLUTE: 2.9 K/CU MM
LYMPHOCYTES RELATIVE PERCENT: 30.1 % (ref 24–44)
MCH RBC QN AUTO: 29.8 PG (ref 27–31)
MCHC RBC AUTO-ENTMCNC: 33 % (ref 32–36)
MCV RBC AUTO: 90.4 FL (ref 78–100)
MONOCYTES ABSOLUTE: 0.6 K/CU MM
MONOCYTES RELATIVE PERCENT: 6.4 % (ref 0–4)
NUCLEATED RBC %: 0 %
PDW BLD-RTO: 13.2 % (ref 11.7–14.9)
PLATELET # BLD: 343 K/CU MM (ref 140–440)
PMV BLD AUTO: 9.8 FL (ref 7.5–11.1)
POTASSIUM SERPL-SCNC: 4 MMOL/L (ref 3.5–5.1)
RBC # BLD: 4.56 M/CU MM (ref 4.2–5.4)
SEGMENTED NEUTROPHILS ABSOLUTE COUNT: 5.8 K/CU MM
SEGMENTED NEUTROPHILS RELATIVE PERCENT: 61.2 % (ref 36–66)
SODIUM BLD-SCNC: 139 MMOL/L (ref 135–145)
TOTAL IMMATURE NEUTOROPHIL: 0.04 K/CU MM
TOTAL NUCLEATED RBC: 0 K/CU MM
TOTAL PROTEIN: 6.7 GM/DL (ref 6.4–8.2)
TOTAL RETICULOCYTE COUNT: 0.12 K/CU MM
TROPONIN T: <0.01 NG/ML
WBC # BLD: 9.5 K/CU MM (ref 4–10.5)

## 2020-09-30 PROCEDURE — 84484 ASSAY OF TROPONIN QUANT: CPT

## 2020-09-30 PROCEDURE — 71046 X-RAY EXAM CHEST 2 VIEWS: CPT

## 2020-09-30 PROCEDURE — 80053 COMPREHEN METABOLIC PANEL: CPT

## 2020-09-30 PROCEDURE — 85025 COMPLETE CBC W/AUTO DIFF WBC: CPT

## 2020-09-30 PROCEDURE — 85379 FIBRIN DEGRADATION QUANT: CPT

## 2020-09-30 PROCEDURE — 99285 EMERGENCY DEPT VISIT HI MDM: CPT

## 2020-09-30 PROCEDURE — 83690 ASSAY OF LIPASE: CPT

## 2020-09-30 PROCEDURE — 6360000002 HC RX W HCPCS: Performed by: EMERGENCY MEDICINE

## 2020-09-30 PROCEDURE — 96374 THER/PROPH/DIAG INJ IV PUSH: CPT

## 2020-09-30 PROCEDURE — 96375 TX/PRO/DX INJ NEW DRUG ADDON: CPT

## 2020-09-30 PROCEDURE — 84703 CHORIONIC GONADOTROPIN ASSAY: CPT

## 2020-09-30 PROCEDURE — 6370000000 HC RX 637 (ALT 250 FOR IP): Performed by: EMERGENCY MEDICINE

## 2020-09-30 PROCEDURE — 93005 ELECTROCARDIOGRAM TRACING: CPT | Performed by: EMERGENCY MEDICINE

## 2020-09-30 RX ORDER — HYDROCODONE BITARTRATE AND ACETAMINOPHEN 5; 325 MG/1; MG/1
1 TABLET ORAL ONCE
Status: COMPLETED | OUTPATIENT
Start: 2020-09-30 | End: 2020-09-30

## 2020-09-30 RX ORDER — NAPROXEN 500 MG/1
500 TABLET ORAL 2 TIMES DAILY PRN
Qty: 14 TABLET | Refills: 0 | Status: SHIPPED | OUTPATIENT
Start: 2020-09-30 | End: 2020-12-04

## 2020-09-30 RX ORDER — FENTANYL CITRATE 50 UG/ML
25 INJECTION, SOLUTION INTRAMUSCULAR; INTRAVENOUS ONCE
Status: COMPLETED | OUTPATIENT
Start: 2020-09-30 | End: 2020-09-30

## 2020-09-30 RX ORDER — ONDANSETRON 2 MG/ML
4 INJECTION INTRAMUSCULAR; INTRAVENOUS ONCE
Status: COMPLETED | OUTPATIENT
Start: 2020-09-30 | End: 2020-09-30

## 2020-09-30 RX ORDER — CYCLOBENZAPRINE HCL 10 MG
10 TABLET ORAL 3 TIMES DAILY PRN
Qty: 21 TABLET | Refills: 0 | Status: SHIPPED | OUTPATIENT
Start: 2020-09-30 | End: 2020-10-10

## 2020-09-30 RX ORDER — KETOROLAC TROMETHAMINE 30 MG/ML
15 INJECTION, SOLUTION INTRAMUSCULAR; INTRAVENOUS ONCE
Status: COMPLETED | OUTPATIENT
Start: 2020-09-30 | End: 2020-09-30

## 2020-09-30 RX ADMIN — ONDANSETRON 4 MG: 2 INJECTION INTRAMUSCULAR; INTRAVENOUS at 19:03

## 2020-09-30 RX ADMIN — FENTANYL CITRATE 25 MCG: 50 INJECTION INTRAMUSCULAR; INTRAVENOUS at 19:04

## 2020-09-30 RX ADMIN — HYDROCODONE BITARTRATE AND ACETAMINOPHEN 1 TABLET: 5; 325 TABLET ORAL at 20:56

## 2020-09-30 RX ADMIN — KETOROLAC TROMETHAMINE 15 MG: 30 INJECTION, SOLUTION INTRAMUSCULAR; INTRAVENOUS at 19:04

## 2020-09-30 ASSESSMENT — PAIN DESCRIPTION - DESCRIPTORS: DESCRIPTORS: ACHING;DULL;STABBING

## 2020-09-30 ASSESSMENT — PAIN SCALES - GENERAL
PAINLEVEL_OUTOF10: 9

## 2020-09-30 ASSESSMENT — PAIN DESCRIPTION - LOCATION: LOCATION: CHEST

## 2020-09-30 ASSESSMENT — HEART SCORE: ECG: 0

## 2020-09-30 NOTE — ED PROVIDER NOTES
EMERGENCY DEPARTMENT ENCOUNTER    Patient: Hermes Power  MRN: [id number]  : 1990  Date of Evaluation: 2020  ED Provider:  [facility] COMPLAINT  Chief Complaint   Patient presents with    Chest Pain    Shortness of Breath       HPI  Hermes Power is a 27 y.o. female who presents with sharp central, nonradiating chest pain that has been intermittent throughout the day today which is worsened with inspiration. Has associated shortness of breath with it. She is unaware of any other triggering or modifying factors. Denies any other associated symptoms or complaints or concerns. Of note, she is on estrogen containing birth control pill.   She denies pain or swelling in her lower extremities and denies any previous history of DVT or PE.      REVIEW OF SYSTEMS    Constitutional: negative for fever, chills  Neurological: negative for HA, focal weakness, loss of sensation  Ophthalmic: negative for vision change  ENT: negative for congestion, rhinorrhea  Cardiovascular: negative for palpitations, edema  Respiratory: negative for wheezing, cough  GI: negative for abdominal pain, nausea, vomiting, diarrhea, constipation  : negative for dysuria, hematuria  Musculoskeletal: negative for myalgias, decreased ROM, joint swelling  Dermatological: negative for rash, wounds  Heme: Negative for bleeding, bruising      PAST MEDICAL HISTORY  Past Medical History:   Diagnosis Date    Biliary dyskinesia     Chronic foot pain, right     Depression     Takes Lexapro    Follicular thyroid cancer ([facility] [address] dx 2019    Surgery\"also tx with radioactive iodine\" follow with dr Nelia Rashid GERD (gastroesophageal reflux disease)     Hypothyroidism     managed per Dr. Nelia Rashid Migraines     Last migraine: 19    PCOS (polycystic ovarian syndrome)     Takes BC    Wears glasses        CURRENT MEDICATIONS  [unfilled]    ALLERGIES  Allergies   Allergen Reactions    Cinnamon Flavor Anaphylaxis     \"allergic to cinnamon candy and cinnamon extract\"\"hives up an down my throat    Codeine Swelling    Pcn [Penicillins] Other (See Comments)     \"had black bruises \"    Cheese      Gi upset       SURGICAL HISTORY  Past Surgical History:   Procedure Laterality Date    CHOLECYSTECTOMY, LAPAROSCOPIC N/A 8/14/2020    CHOLECYSTECTOMY LAPAROSCOPIC performed by Marlena Whitley MD at [address]  08/01/2018    FOOT NEUROMA SURGERY Right 7/24/2020    RIGHT FOOT NEUROMA EXCISION performed by Kirsten Garces DPM at [place] [address] Right 08/01/2018    done with D & C    THYROIDECTOMY Left 8/8/2019    LEFT THYROIDECTOMY performed by Marlena Whitley MD at [address] [place] [address] Right 8/26/2019    RIGHT COMPLETION THYROIDECTOMY performed by Marlena Whitley MD at [address] [place] [address]  Family History   Problem Relation Age of Onset    Cancer Mother         Breast    Arthritis Mother     Hypertension Father     COPD Father     High Blood Pressure Father     Asthma Father         copd    Hypertension Sister     Diabetes Other        SOCIAL HISTORY  Social History     Socioeconomic History    Marital status:      Spouse name: None    Number of children: None    Years of education: None    Highest education level: None   Occupational History     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)   Social Needs    Financial resource strain: None    Food insecurity     Worry: None     Inability: None    Transportation needs     Medical: None     Non-medical: None   Tobacco Use    Smoking status: Never Smoker    Smokeless tobacco: Never Used   Substance and Sexual Activity    Alcohol use: Yes     Comment: Rarely - 1 x year    Drug use: Never    Sexual activity: Not Currently   Lifestyle    Physical activity     Days per week: None     Minutes per session: None    Stress: None   Relationships    Social connections     Talks on phone: None     Gets together: None Attends [employer] service: None     Active member of club or organization: None     Attends meetings of clubs or organizations: None     Relationship status: None    Intimate partner violence     Fear of current or ex partner: None     Emotionally abused: None     Physically abused: None     Forced sexual activity: None   Other Topics Concern    None   Social History Narrative    None         **Past medical, family and social histories, and nursing notes reviewed and verified by me**      PHYSICAL EXAM  VITAL SIGNS:   ED Triage Vitals   Enc Vitals Group      BP 09/30/20 1809 (!) 158/99      Pulse 09/30/20 1809 79      Resp 09/30/20 1809 17      Temp 09/30/20 1803 98 °F (36.7 °C)      Temp Source 09/30/20 1803 Oral      SpO2 09/30/20 1809 100 %      Weight 09/30/20 1803 297 lb (134.7 kg)      Height 09/30/20 1803 5' 4\" (1.626 m)      Head Circumference --       Peak Flow --       Pain Score --       Pain Loc --       Pain Edu? --       Excl. in [address] during ED course were reviewed and are as charted. Constitutional: Minimal distress, Non-toxic appearance  Eyes: Conjunctiva normal, No discharge  HENT: Normocephalic, Atraumatic, bilateral external ears normal, posterior oropharynx is nonerythematous and without exudate, oropharynx moist  Neck: Supple, no stridor, no grossly visible or palpable masses  Cardiovascular: Regular rate and rhythm, No murmurs, No rubs, No gallops, 2+ symmetrical distal pulses, no JVD  Pulmonary/Chest: Normal breath sounds, No respiratory distress or accessory muscle use, No wheezing, crackles or rhonchi. Abdomen: Soft, nondistended and nonrigid, No tenderness or peritoneal signs, No masses, normal bowel sounds  Back: No midline point tenderness, No paraspinous muscle tenderness.  No CVA tenderness  Extremities: No gross deformities, no edema, no tenderness  Neurologic: Normal motor function, Normal sensory function, No focal deficits  Skin: Warm, Dry, No erythema, No rash, No cyanosis, No mottling  Lymphatic: No lymphadenopathy in the following location(s): cervical  Psychiatric: Alert and oriented x3, Affect normal          EKG Interpretation    Interpreted by emergency department physician    Rhythm: normal sinus   Rate: normal  Axis: normal  Ectopy: none  Conduction: normal  ST Segments: normal  T Waves: normal  Q Waves: none    Clinical Impression: Normal EKG    Mj Carpenter      RADIOLOGY/PROCEDURES/LABS/MEDICATIONS ADMINISTERED:    I have reviewed and interpreted all of the currently available lab results from this visit (if applicable):  Results for orders placed or performed during the hospital encounter of 09/30/20   CBC Auto Differential   Result Value Ref Range    WBC 9.5 4.0 - 10.5 K/CU MM    RBC 4.56 4.2 - 5.4 M/CU MM    Hemoglobin 13.6 12.5 - 16.0 GM/DL    Hematocrit 41.2 37 - 47 %    MCV 90.4 78 - 100 FL    MCH 29.8 27 - 31 PG    MCHC 33.0 32.0 - 36.0 %    RDW 13.2 11.7 - 14.9 %    Platelets 063 713 - 091 K/CU MM    MPV 9.8 7.5 - 11.1 FL    Differential Type AUTOMATED DIFFERENTIAL     Segs Relative 61.2 36 - 66 %    Lymphocytes % 30.1 24 - 44 %    Monocytes % 6.4 (H) 0 - 4 %    Eosinophils % 1.4 0 - 3 %    Basophils % 0.5 0 - 1 %    Segs Absolute 5.8 K/CU MM    Lymphocytes Absolute 2.9 K/CU MM    Monocytes Absolute 0.6 K/CU MM    Eosinophils Absolute 0.1 K/CU MM    Basophils Absolute 0.1 K/CU MM    Nucleated RBC % 0.0 %    Total Nucleated RBC 0.0 K/CU MM    TRC 0.1199 K/CU MM    Total Immature Neutrophil 0.04 K/CU MM    Immature Neutrophil % 0.4 0 - 0.43 %   Comprehensive Metabolic Panel w/ Reflex to MG   Result Value Ref Range    Sodium 139 135 - 145 MMOL/L    Potassium 4.0 3.5 - 5.1 MMOL/L    Chloride 103 99 - 110 mMol/L    CO2 27 21 - 32 MMOL/L    BUN 8 6 - 23 MG/DL    CREATININE 0.9 0.6 - 1.1 MG/DL    Glucose 94 70 - 99 MG/DL    Calcium 8.5 8.3 - 10.6 MG/DL    Alb 3.6 3.4 - 5.0 GM/DL    Total Protein 6.7 6.4 - 8.2 GM/DL    Total Bilirubin 0.2 0.0 - 1.0 MG/DL cardiac monitor. Differential diagnoses considered included, but were not limited to, acute coronary syndrome, pulmonary embolus, aortic dissection, pericarditis/cardiac tamponade/effusion, myocarditis, pneumonia, pneumothorax, esophageal rupture, pancreatitis and an acute hepatobiliary process. The HEART score for Chest Pain Patients in the ED:    History   Highly suspicious       2  Moderately suspicious  1  Slightly or non-suspicious  0      EKG    Significant ST depression  2  Non-specific repolarization  1  Normal     0    Age       =/> 65 yrs       2  >45 and <65 yrs     1  =45 yrs           0    Risk factors: DM, current or recent (<1 month) smoker, HTN,   hyperlipidemia, family history of CAD, obesity    =/>3 risk factors or history of CAD 2  1 or 2 risk factors       1   No risk factors   0    Troponin   =/>3x normal limit    2  >1x and <3x normal limit  1  = normal limit      0      This patient has a HEART SCORE of 1, as noted above, putting them at low risk for MACE over the next 6 weeks. 0-3: 0.9-1.7% MACE over next 6 weeks  ? consider discharge home with follow up per literature guidelines    4-6: 12-16.6% MACE over next 6 weeks  ? Clinical Observation    7-10: 50-65% MACE over next 6 weeks  ? Early Invasive Strategies    *MACE= major adverse cardiac event      Low risk for PE and is d-dimer negative. She was given the above medications with improvement of symptoms. Additional workup and treatment in the ED as documented above. Patient reassured and will be discharged home. I have explained to the patient in appropriate terminology our work-up in the ED and their diagnosis. I have also given anticipatory guidance and expectant management of their condition as an outpatient as per my custom. The patient was given clear discharge and follow-up instructions including return to the ER immediately for worsening concerns.  The patient has been advised to follow-up with their primary care physician and/or referred physician in the next two to three days or sooner if worsening and to return to the ER immediately as above with any concerns. I provided the patient counseling with regard to my customary list of strict return precautions as well as return precautions specific to the cause for today's emergency department visit. The patient will return under these provided conditions, but should also return for new concerns or further worsening. Pt and/or family understand and agree with plan. Clinical Impression:  1. Chest pain, unspecified type        Disposition referral (if applicable):  Khoi Oswald DO  [address] [place] [phone]    Schedule an appointment as soon as possible for a visit       [facility] Emergency Department  [place] [id number] [phone]    If symptoms worsen      Disposition medications (if applicable):  New Prescriptions    CYCLOBENZAPRINE (FLEXERIL) 10 MG TABLET    Take 1 tablet by mouth 3 times daily as needed for Muscle spasms    NAPROXEN (NAPROSYN) 500 MG TABLET    Take 1 tablet by mouth 2 times daily as needed for Pain       ED Provider Disposition Time  DISPOSITION Decision To Discharge 09/30/2020 08:44:49 PM          Electronically signed by: Omar Vogel M.D., 9/30/2020 8:48 PM      This dictation was created with voice recognition software. While attempts have been made to review the dictation as it is transcribed, on occasion the spoken word can be misinterpreted by the technology leading to omissions or inappropriate words, phrases or sentences.         Neftali Villarreal MD  09/30/20 2046

## 2020-10-01 PROCEDURE — 93010 ELECTROCARDIOGRAM REPORT: CPT | Performed by: INTERNAL MEDICINE

## 2020-10-01 NOTE — ED NOTES
Patient discharged to home at this time. Discharge instructions and follow up care discussed, patient voices understanding.       Leonidas Ortiz RN  09/30/20 0780

## 2020-10-02 LAB
EKG ATRIAL RATE: 83 BPM
EKG DIAGNOSIS: NORMAL
EKG P AXIS: 41 DEGREES
EKG P-R INTERVAL: 158 MS
EKG Q-T INTERVAL: 386 MS
EKG QRS DURATION: 80 MS
EKG QTC CALCULATION (BAZETT): 453 MS
EKG R AXIS: 9 DEGREES
EKG T AXIS: 9 DEGREES
EKG VENTRICULAR RATE: 83 BPM

## 2020-10-06 ENCOUNTER — OFFICE VISIT (OUTPATIENT)
Dept: INTERNAL MEDICINE CLINIC | Age: 30
End: 2020-10-06
Payer: COMMERCIAL

## 2020-10-06 VITALS
DIASTOLIC BLOOD PRESSURE: 90 MMHG | SYSTOLIC BLOOD PRESSURE: 122 MMHG | BODY MASS INDEX: 51.84 KG/M2 | HEART RATE: 98 BPM | TEMPERATURE: 97.3 F | WEIGHT: 293 LBS | OXYGEN SATURATION: 99 %

## 2020-10-06 PROCEDURE — 99213 OFFICE O/P EST LOW 20 MIN: CPT | Performed by: FAMILY MEDICINE

## 2020-10-06 ASSESSMENT — ENCOUNTER SYMPTOMS
SHORTNESS OF BREATH: 0
CONSTIPATION: 0
DIARRHEA: 0
BLOOD IN STOOL: 0
NAUSEA: 0
BACK PAIN: 0
COUGH: 0
ABDOMINAL PAIN: 0

## 2020-10-06 NOTE — PROGRESS NOTES
John Chahal  1990  27 y.o.  female    Chief Complaint   Patient presents with    Chest Pain     Follow up ER         History of Present Illness  John Chahal is a 27 y.o. female who presents today for a [address] ER follow up 09/30. She was seen for chest pain and Sob. The pain was central and sharp. D-dimer and troponin was negative. CXR was negative. EKG NSR. She does not have chest pain today, but states it can occur off and on. Bp reading slightly elevated today, she states it is usually normal. No tobacco use. Review of Systems   Constitutional: Negative for diaphoresis and fever. HENT: Negative. Respiratory: Negative for cough, chest tightness and shortness of breath. Cardiovascular: Positive for chest pain (off and on). Negative for palpitations. Gastrointestinal: Negative for abdominal pain, blood in stool, constipation, diarrhea and nausea. Genitourinary: Negative for difficulty urinating. Musculoskeletal: Negative for back pain and neck pain. Neurological: Negative for dizziness, light-headedness and headaches. Psychiatric/Behavioral: Negative for dysphoric mood.        Allergies   Allergen Reactions    Cinnamon Flavor Anaphylaxis     \"allergic to cinnamon candy and cinnamon extract\"\"hives up an down my throat    Codeine Swelling    Pcn [Penicillins] Other (See Comments)     \"had black bruises \"    Cheese      Gi upset       Past Medical History:   Diagnosis Date    Biliary dyskinesia     Cholecystectomy    Chronic foot pain, right     Depression     Takes Lexapro    Follicular thyroid cancer ([facility] [address] dx 2019    Surgery\"also tx with radioactive iodine\" follow with dr Wong Lima GERD (gastroesophageal reflux disease)     Hypothyroidism     managed per Dr. Wong Lima Migraines     Last migraine: 8/11/19    PCOS (polycystic ovarian syndrome)     Takes BC    Wears glasses        Past Surgical History:   Procedure Laterality Date    CHOLECYSTECTOMY, LAPAROSCOPIC N/A 8/14/2020 CHOLECYSTECTOMY LAPAROSCOPIC performed by Anna Hutchinson MD at [facility]  08/01/2018    FOOT NEUROMA SURGERY Right 7/24/2020    RIGHT FOOT NEUROMA EXCISION performed by Feliz Rick DPM at [address] Right 08/01/2018    done with D & C    THYROIDECTOMY Left 8/8/2019    LEFT THYROIDECTOMY performed by Anna Hutchinson MD at [address] Right 8/26/2019    RIGHT COMPLETION THYROIDECTOMY performed by Anna Hutchinson MD at [address] [place] History   Problem Relation Age of Onset   Dossie Flor Cancer Mother         Breast    Arthritis Mother     Hypertension Father     COPD Father     High Blood Pressure Father     Asthma Father         copd    Hypertension Sister     Diabetes Other        Social History     Tobacco Use    Smoking status: Never Smoker    Smokeless tobacco: Never Used   Substance Use Topics    Alcohol use: Yes     Comment: Rarely - 1 x year    Drug use: Never       Current Outpatient Medications   Medication Sig Dispense Refill    naproxen (NAPROSYN) 500 MG tablet Take 1 tablet by mouth 2 times daily as needed for Pain 14 tablet 0    cyclobenzaprine (FLEXERIL) 10 MG tablet Take 1 tablet by mouth 3 times daily as needed for Muscle spasms 21 tablet 0    BLISOVI FE 1/20 1-20 MG-MCG per tablet Take 1 tablet by mouth daily 1 packet 2    levothyroxine (SYNTHROID) 175 MCG tablet Take 175 mcg by mouth Daily      escitalopram (LEXAPRO) 10 MG tablet Take 1 tablet by mouth daily 30 tablet 5    pantoprazole (PROTONIX) 40 MG tablet Take 40 mg by mouth nightly        No current facility-administered medications for this visit. OBJECTIVE:    BP (!) 122/90   Pulse 98   Temp 97.3 °F (36.3 °C)   Wt (!) 302 lb (137 kg)   SpO2 99%   BMI 51.84 kg/m²     Physical Exam  Vitals signs reviewed. Constitutional:       General: She is not in acute distress. Appearance: She is well-developed.    HENT:      Right Ear: Tympanic membrane normal.      Left Ear: Tympanic membrane normal.      Nose: Nose normal.      Mouth/Throat:      Mouth: Mucous membranes are moist.   Eyes:      Extraocular Movements: Extraocular movements intact. Pupils: Pupils are equal, round, and reactive to light. Neck:      Musculoskeletal: Neck supple. Cardiovascular:      Rate and Rhythm: Normal rate and regular rhythm. Pulmonary:      Effort: Pulmonary effort is normal. No respiratory distress. Breath sounds: Normal breath sounds. Abdominal:      General: Bowel sounds are normal. There is no distension. Palpations: Abdomen is soft. Tenderness: There is no abdominal tenderness. Musculoskeletal: Normal range of motion. Right lower leg: No edema. Left lower leg: No edema. Neurological:      Mental Status: She is alert and oriented to person, place, and time. Cranial Nerves: No cranial nerve deficit. Psychiatric:         Mood and Affect: Mood normal.         ASSESSMENT:  1. Intermittent chest pain    2. Elevated blood pressure reading        PLAN:    Orders Placed This Encounter   Procedures   [facility], [facility]   Monitor blood pressure  May need to stop OCP's. To see Gyn in Nov  Refer to cardiology  Keep f/u with specialists  [place]rence [place] or call. Worse to ER         Return if symptoms worsen or fail to improve.     Electronically Signed by Helen Parsons DO

## 2020-10-10 PROBLEM — C73 FOLLICULAR CARCINOMA (HCC): Status: RESOLVED | Noted: 2019-08-21 | Resolved: 2020-10-10

## 2020-10-10 PROBLEM — D49.7 FOLLICULAR NEOPLASM OF THYROID: Status: RESOLVED | Noted: 2019-07-17 | Resolved: 2020-10-10

## 2020-10-10 PROBLEM — D34 FOLLICULAR ADENOMA OF THYROID GLAND: Status: RESOLVED | Noted: 2019-08-08 | Resolved: 2020-10-10

## 2020-10-10 ASSESSMENT — ENCOUNTER SYMPTOMS: CHEST TIGHTNESS: 0

## 2020-10-16 ENCOUNTER — NURSE ONLY (OUTPATIENT)
Dept: CARDIOLOGY CLINIC | Age: 30
End: 2020-10-16
Payer: COMMERCIAL

## 2020-10-16 ENCOUNTER — INITIAL CONSULT (OUTPATIENT)
Dept: CARDIOLOGY CLINIC | Age: 30
End: 2020-10-16
Payer: COMMERCIAL

## 2020-10-16 VITALS
HEIGHT: 64 IN | DIASTOLIC BLOOD PRESSURE: 110 MMHG | SYSTOLIC BLOOD PRESSURE: 134 MMHG | BODY MASS INDEX: 39.13 KG/M2 | HEART RATE: 72 BPM | WEIGHT: 229.2 LBS

## 2020-10-16 PROCEDURE — 99244 OFF/OP CNSLTJ NEW/EST MOD 40: CPT | Performed by: INTERNAL MEDICINE

## 2020-10-16 RX ORDER — CALCIUM CARBONATE 200(500)MG
1 TABLET,CHEWABLE ORAL DAILY PRN
COMMUNITY

## 2020-10-16 RX ORDER — CYCLOBENZAPRINE HCL 10 MG
10 TABLET ORAL 3 TIMES DAILY PRN
COMMUNITY
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
## 2020-10-29 ENCOUNTER — PROCEDURE VISIT (OUTPATIENT)
Dept: CARDIOLOGY CLINIC | Age: 30
End: 2020-10-29
Payer: COMMERCIAL

## 2020-10-29 VITALS
BODY MASS INDEX: 39.09 KG/M2 | DIASTOLIC BLOOD PRESSURE: 70 MMHG | HEART RATE: 77 BPM | HEIGHT: 64 IN | SYSTOLIC BLOOD PRESSURE: 130 MMHG | WEIGHT: 229 LBS

## 2020-10-29 PROBLEM — R03.0 ELEVATED BLOOD PRESSURE READING IN OFFICE WITHOUT DIAGNOSIS OF HYPERTENSION: Status: ACTIVE | Noted: 2020-10-16

## 2020-10-29 PROBLEM — R00.2 PALPITATIONS: Status: ACTIVE | Noted: 2020-10-01

## 2020-10-29 PROBLEM — R42 DIZZINESS: Status: ACTIVE | Noted: 2020-10-01

## 2020-10-29 PROBLEM — R03.0 ELEVATED BLOOD PRESSURE READING: Status: ACTIVE | Noted: 2020-10-01

## 2020-10-29 PROCEDURE — 93015 CV STRESS TEST SUPVJ I&R: CPT | Performed by: INTERNAL MEDICINE

## 2020-11-05 ENCOUNTER — HOSPITAL ENCOUNTER (OUTPATIENT)
Dept: SLEEP CENTER | Age: 30
Discharge: HOME | End: 2020-11-05
Payer: COMMERCIAL

## 2020-11-05 VITALS
HEART RATE: 68 BPM | OXYGEN SATURATION: 98 % | DIASTOLIC BLOOD PRESSURE: 90 MMHG | WEIGHT: 293 LBS | SYSTOLIC BLOOD PRESSURE: 132 MMHG | HEIGHT: 64 IN | BODY MASS INDEX: 50.02 KG/M2

## 2020-11-05 PROBLEM — G47.33 OSA (OBSTRUCTIVE SLEEP APNEA): Status: ACTIVE | Noted: 2020-11-05

## 2020-11-05 PROBLEM — E66.01 MORBID OBESITY (HCC): Status: ACTIVE | Noted: 2020-11-05

## 2020-11-05 PROBLEM — G47.10 HYPERSOMNIA: Status: ACTIVE | Noted: 2020-11-05

## 2020-11-05 PROCEDURE — 99211 OFF/OP EST MAY X REQ PHY/QHP: CPT

## 2020-11-05 PROCEDURE — 99204 OFFICE O/P NEW MOD 45 MIN: CPT | Performed by: INTERNAL MEDICINE

## 2020-11-05 ASSESSMENT — SLEEP AND FATIGUE QUESTIONNAIRES
HOW LIKELY ARE YOU TO NOD OFF OR FALL ASLEEP WHILE SITTING INACTIVE IN A PUBLIC PLACE: 0
HOW LIKELY ARE YOU TO NOD OFF OR FALL ASLEEP WHILE SITTING AND TALKING TO SOMEONE: 0
HOW LIKELY ARE YOU TO NOD OFF OR FALL ASLEEP WHEN YOU ARE A PASSENGER IN A CAR FOR AN HOUR WITHOUT A BREAK: 3
HOW LIKELY ARE YOU TO NOD OFF OR FALL ASLEEP WHILE SITTING AND READING: 2
HOW LIKELY ARE YOU TO NOD OFF OR FALL ASLEEP IN A CAR, WHILE STOPPED FOR A FEW MINUTES IN TRAFFIC: 0
HOW LIKELY ARE YOU TO NOD OFF OR FALL ASLEEP WHILE SITTING QUIETLY AFTER LUNCH WITHOUT ALCOHOL: 0
HOW LIKELY ARE YOU TO NOD OFF OR FALL ASLEEP WHILE WATCHING TV: 3
ESS TOTAL SCORE: 9
HOW LIKELY ARE YOU TO NOD OFF OR FALL ASLEEP WHILE LYING DOWN TO REST IN THE AFTERNOON WHEN CIRCUMSTANCES PERMIT: 1

## 2020-11-05 NOTE — ASSESSMENT & PLAN NOTE
She has all the symptoms that are consistent with TIGIST  Advised to go for the sleep study  Loose weight

## 2020-11-05 NOTE — CONSULTS
Blayne Gavin MD, Benjamin Herrmann MD, [place] MD, Paul Ramirez MD, [facility]      [address], [address] [facility]   [place]: [phone]  F: [phone]     Subjective:     Patient ID: Chen Javier is a 27 y.o. female, referred to the sleep center for   Chief Complaint   Patient presents with    Chest Pain   . Referring physician:  Dr. Annalee Balbuena    History:  Con Cousin has been referred for the TIGIST. She has gained about 100 lbs in the last 2 years. She goes to bed at 10 PM and it takes few mins to fall asleep. She is not sure if she snores and not sure if she stops breathing. She wakes up about 1 time to the bathroom. It takes about few mins to fall back to sleep. She never woke up choking or gasping for air, woke up with dry mouth and some palpitations. She has no RLS. She gets up at 7 AM and she is tired. She has morning headaches. She is sleepy and tired during the day time. She has no sleep paralysis, no cataplexy, no hypnogogic or hypnopompic hallucinations. She has depression on treatment. She has no h/o smoking. She is not shift worker. 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[employer])   Social Needs    Financial resource strain: Not on file    Food insecurity     Worry: Not on file     Inability: Not on file    Transportation needs     Medical: Not on file     Non-medical: Not on file   Tobacco Use    Smoking status: Never Smoker    Smokeless tobacco: Never Used   Substance and Sexual Activity    Alcohol use: Yes     Comment: Rarely - 1 x year    Drug use: Never    Sexual activity: Not Currently   Lifestyle    Physical activity     Days per week: Not on file     Minutes per session: Not on file    Stress: Not on file   Relationships    Social connections     Talks on phone: Not on file     Gets together: Not on file     Attends [employer] service: Not on file     Active member of club or organization: Not on file     Attends meetings of clubs or organizations: Not on file     Relationship status: Not on file    Intimate partner violence     Fear of current or ex partner: Not on file     Emotionally abused: Not on file     Physically abused: Not on file     Forced sexual activity: Not on file   Other Topics Concern    Not on file   Social History Narrative    Not on file       Prior to Admission medications    Medication Sig Start Date End Date Taking?  Authorizing Provider   calcium carbonate (TUMS) 500 MG chewable tablet Take 1 tablet by mouth daily as needed for Heartburn   Yes Historical Provider, MD   BLISOVI FE 1/20 1-20 MG-MCG per tablet Take 1 tablet by mouth daily 9/18/20  Yes Franklin Lopez DO   levothyroxine (SYNTHROID) 175 MCG tablet Take 175 mcg by mouth Daily   Yes Historical Provider, MD   escitalopram (LEXAPRO) 10 MG tablet Take 1 tablet by mouth daily 7/2/20  Yes Franklin Lopez DO   cyclobenzaprine (FLEXERIL) 10 MG tablet Take 10 mg by mouth 3 times daily as needed for Muscle spasms    Historical Provider, MD   naproxen (NAPROSYN) 500 MG tablet Take 1 tablet by mouth 2 times daily as needed for Pain  Patient not taking: Reported on 11/5/2020 9/30/20   Misty Rangel MD   pantoprazole (PROTONIX) 40 MG tablet Take 40 mg by mouth nightly     Historical Provider, MD       Allergies as of 11/05/2020 - Review Complete 11/05/2020   Allergen Reaction Noted    Cinnamon flavor Anaphylaxis 08/10/2020    Codeine Swelling 04/10/2019    Pcn [penicillins] Other (See Comments) 04/10/2019    Cheese  08/08/2019       Patient Active Problem List   Diagnosis    [facility] follicular [facility]    Chronic cholecystitis    Status post laparoscopic cholecystectomy    PCOS (polycystic ovarian syndrome)    Migraines    Hypothyroidism    GERD (gastroesophageal reflux disease)    Depression    Chest pain    Palpitations    Dizziness    Elevated blood pressure reading    Elevated blood pressure reading in office without diagnosis of hypertension    TIGIST (obstructive sleep apnea)    Morbid obesity (HCC)    Hypersomnia       Past Medical History:   Diagnosis Date    Biliary dyskinesia     Cholecystectomy    Chest pain 10/2020    Chronic foot pain, right     Depression     Takes Lexapro    Dizziness 10/2020    Elevated blood pressure reading in office without diagnosis of hypertension 31/98/6450    Follicular thyroid cancer ([address] dx 2019    Surgery\"also tx with radioactive iodine\" follow with dr Mary Lou Spence GERD (gastroesophageal reflux disease)     History of exercise stress test 10/29/2020    Treadmill, Physiological BP response to exercise. ETT non diagnostic as THR is ot achieved. However patient did not have chest   pain & there was no EKG ischemia at 80 % HR.     Hypothyroidism     managed per Dr. Anneliese Lemon     Last migraine: 8/11/19    Palpitations 10/2020    PCOS (polycystic ovarian syndrome)     Takes BC    Wears glasses        Past Surgical History:   Procedure Laterality Date    CHOLECYSTECTOMY, LAPAROSCOPIC N/A 8/14/2020    CHOLECYSTECTOMY LAPAROSCOPIC performed by Anna Hutchinson MD at [address] [facility]  08/01/2018    FOOT NEUROMA SURGERY Right 7/24/2020    RIGHT FOOT NEUROMA EXCISION performed by Feliz Rick DPM at [place] [address] Right 08/01/2018    done with D & C    THYROIDECTOMY Left 8/8/2019    LEFT THYROIDECTOMY performed by Anna Hutchinson MD at [address] [facility] Right 8/26/2019    RIGHT COMPLETION THYROIDECTOMY performed by Anna Hutchinson MD at [address] [facility] OR       Family History   Problem Relation Age of Onset    Cancer Mother         Breast    Arthritis Mother     Hypertension Father     COPD Father     High Blood Pressure Father     Asthma Father         copd    Hypertension Sister     Diabetes Other          Objective:   BP (!) 132/90   Pulse 68   Ht 5' 4\" (1.626 m)   Wt 300 lb (136.1 kg)   SpO2 98%   BMI 51.49 kg/m²   Body mass index is 51.49 kg/m². Sleep Medicine 11/5/2020   Sitting and reading 2   Watching TV 3   Sitting, inactive in a public place (e.g. a theatre or a meeting) 0   As a passenger in a car for an hour without a break 3   Lying down to rest in the afternoon when circumstances permit 1   Sitting and talking to someone 0   Sitting quietly after a lunch without alcohol 0   In a car, while stopped for a few minutes in traffic 0   Total score 9   Neck circumference 16.5     {MALLAMPATI:3    Vitals:    11/05/20 0858   BP: (!) 132/90   Pulse: 68   SpO2: 98%   Weight: 300 lb (136.1 kg)   Height: 5' 4\" (1.626 m)     Neck circumference: 16.5  Inches  [place] - Total score: 9    Gen: No distress. Eyes: PERRL. No sclera icterus. No conjunctival injection. ENT: No discharge. Pharynx clear. External appearance of ears and nose normal.OVERJET  Neck: Trachea midline. No obvious mass. Resp: No accessory muscle use. No crackles. No wheezes. No rhonchi. No dullness on percussion. CV: Regular rate. Regular rhythm. No murmur or rub. No edema. GI: Non-tender. Non-distended. No hernia. Skin: Warm, dry, normal texture and turgor. No nodule on exposed extremities. Lymph: No cervical LAD. No supraclavicular LAD. M/S: No cyanosis. No clubbing. No joint deformity. Psych: Oriented x 3. No anxiety. Awake. Alert. Intact judgement and insight.     Mallampati Airway Classification:   []1 []2 [x]3 []4        Assessment and Plan     Diagnosis:    Problem List        Pulmonary Problems    TIGIST (obstructive sleep apnea)     She has all the symptoms that are consistent with TIGIST  Advised to go for the sleep study  Loose weight         Relevant Orders Home Sleep Study       Other    Morbid obesity ([facility]     Advised to loose weight with diet and exercise           Relevant Medications    calcium carbonate (TUMS) 500 MG chewable tablet    Hypersomnia     Advised to go for the sleep study  Loose weight                     Additional Plan:     [x]  Sleep hygiene/ relaxation methods & CBTi principles review with patient   [x]  Avoid supine/back sleep until sleep study   [x]  Driving precautions   [x]  Medical consequences of untreated TIGIST   [x]  Weight loss recommendations   [x]  Diet recommendations   [x]  Exercise   []  Advised to quit smoking       []  PFT referral   []  Bariatric Program referral      Follow-Up:    No follow-ups on file.     Electronically signed by Helen Martin MD on 11/5/2020 at 9:34 AM

## 2020-11-06 ENCOUNTER — PROCEDURE VISIT (OUTPATIENT)
Dept: CARDIOLOGY CLINIC | Age: 30
End: 2020-11-06
Payer: COMMERCIAL

## 2020-11-06 LAB
LV EF: 58 %
LVEF MODALITY: NORMAL

## 2020-11-06 PROCEDURE — 93306 TTE W/DOPPLER COMPLETE: CPT | Performed by: INTERNAL MEDICINE

## 2020-11-10 ENCOUNTER — TELEPHONE (OUTPATIENT)
Dept: CARDIOLOGY CLINIC | Age: 30
End: 2020-11-10

## 2020-11-10 NOTE — TELEPHONE ENCOUNTER
Notified pt of results. Summary   Limited study due to patients body habitus. Left ventricular function is normal, EF is estimated at 55-60%. Moderate left ventricular hypertrophy. No evidence of diastolic dysfunction. No regional wall motion abnormalities were detected. Mildly dilated left atrium. Trace tricuspid regurgitation; RVSP is 21 mmHg. No significant valvular disease noted. No evidence of pericardial effusion.

## 2020-11-11 NOTE — PROGRESS NOTES
[facility] [facility]  Phone: [phone]    Fax [phone]                  Marie Manzano MD, Juan Zuleta MD, Jim aTlbot MD, MD Joanna Mohr MD Eusebio Lands, MD Eric Connor, APRRONEL Ness, CAM Vieyra, APRRONEL Montano, APRRONEL    CARDIOLOGY  NOTE      11/12/2020    RE: Sharon Morales  (1990)                               TO:  Dr. Edna Sidhu,   The primary cardiologist is Dr. Yasemin Stover     CC:   1. Chest pain, unspecified type    2. Morbid obesity ([address]    3. Palpitations        Patient denies all of the following:  Palpitations  Shortness of Breath  Edema  Dizziness  Syncope      HPI: Thank you for involving me in taking care of your patient Sharon Morales, who is a  27y.o. year old female with a history as listed above. Patient presents to the office for follow up on chest pain, obesity, and palpitations. Chest pain is sharp, mid sternal, and under left breast lasting approximately 2 min. Only happening occassionally since ER visit. Lexapro increased this week. Negative stress test and echo. Holter monitor showed no significant abnormalities.      Vitals:    11/12/20 0858   BP: (!) 122/97   Pulse:    Resp:    Temp:    SpO2:        Current Outpatient Medications   Medication Sig Dispense Refill    cyclobenzaprine (FLEXERIL) 10 MG tablet Take 10 mg by mouth 3 times daily as needed for Muscle spasms      calcium carbonate (TUMS) 500 MG chewable tablet Take 1 tablet by mouth daily as needed for Heartburn      naproxen (NAPROSYN) 500 MG tablet Take 1 tablet by mouth 2 times daily as needed for Pain 14 tablet 0    BLISOVI FE 1/20 1-20 MG-MCG per tablet Take 1 tablet by mouth daily 1 packet 2    levothyroxine (SYNTHROID) 175 MCG tablet Take 175 mcg by mouth Daily      escitalopram (LEXAPRO) 10 MG tablet Take 1 tablet by mouth daily (Patient taking differently: Take 20 mg by mouth daily ) 30 tablet 5    pantoprazole (PROTONIX) 40 MG tablet Take 40 mg by mouth nightly        No current facility-administered medications for this visit. Allergies: Cinnamon flavor; Codeine; Pcn [penicillins]; and Cheese  Past Medical History:   Diagnosis Date    Biliary dyskinesia     Cholecystectomy    Chest pain 10/2020    Chronic foot pain, right     Depression     Takes Lexapro    Dizziness 10/2020    Elevated blood pressure reading in office without diagnosis of hypertension 09/43/8289    Follicular thyroid cancer ([facility] dx 2019    Surgery\"also tx with radioactive iodine\" follow with dr Valentin Parker GERD (gastroesophageal reflux disease)     H/O echocardiogram 11/06/2020    EF 55-60%, Mod LVH.  History of exercise stress test 10/29/2020    Treadmill, Physiological BP response to exercise. ETT non diagnostic as THR is ot achieved. However patient did not have chest   pain & there was no EKG ischemia at 80 % HR.     Hypothyroidism     managed per Dr. Za Scott     Last migraine: 8/11/19    Palpitations 10/2020    PCOS (polycystic ovarian syndrome)     Takes BC    Wears glasses      Past Surgical History:   Procedure Laterality Date    CHOLECYSTECTOMY, LAPAROSCOPIC N/A 8/14/2020    CHOLECYSTECTOMY LAPAROSCOPIC performed by Pao Hartmann MD at [facility]  08/01/2018    FOOT NEUROMA SURGERY Right 7/24/2020    RIGHT FOOT NEUROMA EXCISION performed by Octavia Martinez DPM at [address] [facility] Right 08/01/2018    done with D & C    THYROIDECTOMY Left 8/8/2019    LEFT THYROIDECTOMY performed by Pao Hartmann MD at [address] Right 8/26/2019    RIGHT COMPLETION THYROIDECTOMY performed by Pao Hartmann MD at [address] History   Problem Relation Age of Onset   Yarelis Adam Cancer Mother         Breast    Arthritis Mother     Hypertension Father     COPD Father     High Blood Pressure Father     Asthma Father         copd    Hypertension Sister     Diabetes Other      Social History     Tobacco Use    Smoking status: Never Smoker    Smokeless tobacco: Never Used   Substance Use Topics    Alcohol use: Yes     Comment: Rarely - 1 x year        Review of Systems - History obtained from the patient  General: negative for - fatigue, malaise, night sweats, weight gain  Psychological: negative for - anxiety, depression, sleep disturbances  Ophthalmic: negative for - blurry vision, loss of vision  ENT: negative for - headaches, vertigo, visual changes  Hematological and Lymphatic: negative for - bleeding problems, blood clots, bruising, fatigue or pallor  Endocrine: negative for - malaise/lethargy, palpitations, unexpected weight changes  Respiratory: negative for - cough, orthopnea, shortness of breath or tachypnea  Cardiovascular: negative for - chest pain, dyspnea on exertion, edema or palpitations  Gastrointestinal: no abdominal pain, change in bowel habits, or black or bloody stools  Genito-Urinary: no dysuria, trouble voiding, or hematuria  Musculoskeletal: negative for - gait disturbance, pain, swelling    Neurological: negative for - confusion, dizziness, impaired coordination/balance or numbness/tingling    Objective:      Physical Exam:  BP (!) 122/97 (Site: Right Upper Arm, Position: Sitting, Cuff Size: Large Adult)   Pulse 91   Temp 97 °F (36.1 °C) (Infrared)   Resp 16   Wt (!) 303 lb 6.4 oz (137.6 kg)   SpO2 97%   Breastfeeding No   BMI 52.08 kg/m²   Wt Readings from Last 3 Encounters:   11/12/20 (!) 303 lb 6.4 oz (137.6 kg)   11/05/20 300 lb (136.1 kg)   10/29/20 229 lb (103.9 kg)     Body mass index is 52.08 kg/m². GENERAL - Alert, oriented, pleasant, in no apparent distress. Head unremarkable  Eyes - pupils equal and reactive to light - bilateral conjunctiva are pink: sclera are white   ENT - external ears intact, nose is intact:  tongue is midline pink and moist  Neck - Supple.  No jugular venous distention noted. No carotid bruits appreciated. Cardiovascular - Normal S1 and S2:  murmur appreciated No, No gallop. Regular rate- Yes,  rhythm regular-Yes. Extremities - No cyanosis, clubbing,  edema to lower legs. Pulmonary - No respiratory distress. No wheezes or rales. Chest is clear  Pulses: Bilateral radial and pedal pulses normal  Abdomen -  Soft no tenderness, non distended   Musculoskeletal - Normal movement of all extremities   Neurologic - alert and oriented: There are no gross focal neurologic abnormalities. Skin-  No rash: No echymosis   Affect- normal mood and pleasant       DATA:  No results found for: CKTOTAL, CKMB, CKMBINDEX, TROPONINI  BNP:  No results found for: BNP  PT/INR:  No results found for: PTINR  No results found for: LABA1C  No results found for: CHOL, TRIG, HDL, LDLCALC, LDLDIRECT  Lab Results   Component Value Date    ALT 12 09/30/2020    AST 14 (L) 09/30/2020     TSH:  No results found for: TSH    Echo:  Limited study due to patients body habitus. Left ventricular function is normal, EF is estimated at 55-60%. Moderate left ventricular hypertrophy. No evidence of diastolic dysfunction. No regional wall motion abnormalities were detected. Mildly dilated left atrium. Trace tricuspid regurgitation; RVSP is 21 mmHg. No significant valvular disease noted. No evidence of pericardial effusion. Stress Test:10/29/2020  No ischemia     The ASCVD Risk score (Tierra Doyle, et al., 2013) failed to calculate for the following reasons: The 2013 ASCVD risk score is only valid for ages 36 to 78    Assessment/ Plan:     Palpitations  48 hour monitor showed no significant abnormalities. Discussed abstaining from caffeine. Chest pain  Stress test and echo WNL. 48-hour monitor reviewed patient sinus rhythm lowest heart rate 44 highest 142 average rate of 74.   Patient recently had Lexapro increased and reports chest pain is less frequent only lasting approximately 2 minutes. Patient is scheduled for CT of chest because of history of thyroid CA. No further work-up at this time    Morbid obesity ([address]  Discussed diet and exercise. Recommend weight loss. Patient seen, interviewed and examined. Testing was reviewed. Patient is encouraged to exercise even a brisk walk for 30 minutes at least 3 to 4 times a week. Lifestyle and risk factor modificatons discussed. Various goals are discussed and questions answered. Continue current medications. Appropriate prescriptions are addressed. Questions answered and patient verbalizes understanding. Call for any problems, questions, or concerns. Pt is to follow up in 3 months for Cardiac management    Electronically signed by CAM Abebe CNP on 11/12/2020 at 9:35 AM

## 2020-11-12 ENCOUNTER — OFFICE VISIT (OUTPATIENT)
Dept: CARDIOLOGY CLINIC | Age: 30
End: 2020-11-12
Payer: COMMERCIAL

## 2020-11-12 VITALS
HEART RATE: 91 BPM | WEIGHT: 293 LBS | DIASTOLIC BLOOD PRESSURE: 97 MMHG | SYSTOLIC BLOOD PRESSURE: 122 MMHG | RESPIRATION RATE: 16 BRPM | TEMPERATURE: 97 F | OXYGEN SATURATION: 97 % | BODY MASS INDEX: 52.08 KG/M2

## 2020-11-12 PROCEDURE — 99214 OFFICE O/P EST MOD 30 MIN: CPT | Performed by: NURSE PRACTITIONER

## 2020-11-12 PROCEDURE — 93228 REMOTE 30 DAY ECG REV/REPORT: CPT | Performed by: INTERNAL MEDICINE

## 2020-11-12 NOTE — ASSESSMENT & PLAN NOTE
Stress test and echo WNL. 48-hour monitor reviewed patient sinus rhythm lowest heart rate 44 highest 142 average rate of 74. Patient recently had Lexapro increased and reports chest pain is less frequent only lasting approximately 2 minutes. Patient is scheduled for CT of chest because of history of thyroid CA.      No further work-up at this time

## 2020-11-23 ENCOUNTER — HOSPITAL ENCOUNTER (OUTPATIENT)
Dept: NUCLEAR MEDICINE | Age: 30
Discharge: HOME | End: 2020-11-23
Payer: COMMERCIAL

## 2020-11-23 ENCOUNTER — HOSPITAL ENCOUNTER (OUTPATIENT)
Dept: SLEEP CENTER | Age: 30
Discharge: HOME | End: 2020-11-23
Payer: COMMERCIAL

## 2020-11-23 PROCEDURE — 78018 THYROID MET IMAGING BODY: CPT

## 2020-11-23 PROCEDURE — A9509 IODINE I-123 SOD IODIDE MIL: HCPCS | Performed by: INTERNAL MEDICINE

## 2020-11-23 PROCEDURE — G0398 HOME SLEEP TEST/TYPE 2 PORTA: HCPCS

## 2020-11-23 PROCEDURE — 3430000000 HC RX DIAGNOSTIC RADIOPHARMACEUTICAL: Performed by: INTERNAL MEDICINE

## 2020-11-23 PROCEDURE — 95806 SLEEP STUDY UNATT&RESP EFFT: CPT | Performed by: INTERNAL MEDICINE

## 2020-11-23 RX ADMIN — Medication 1.8 MILLICURIE: at 13:55

## 2020-11-24 ENCOUNTER — HOSPITAL ENCOUNTER (OUTPATIENT)
Dept: NUCLEAR MEDICINE | Age: 30
Discharge: HOME | End: 2020-11-24
Payer: COMMERCIAL

## 2020-11-27 ENCOUNTER — HOSPITAL ENCOUNTER (OUTPATIENT)
Dept: CT IMAGING | Age: 30
Discharge: HOME | End: 2020-11-27
Payer: COMMERCIAL

## 2020-11-27 PROCEDURE — 6360000004 HC RX CONTRAST MEDICATION: Performed by: SURGERY

## 2020-11-27 PROCEDURE — 2580000003 HC RX 258: Performed by: SURGERY

## 2020-11-27 PROCEDURE — 71260 CT THORAX DX C+: CPT

## 2020-11-27 RX ORDER — SODIUM CHLORIDE 0.9 % (FLUSH) 0.9 %
10 SYRINGE (ML) INJECTION PRN
Status: DISCONTINUED | OUTPATIENT
Start: 2020-11-27 | End: 2020-11-28 | Stop reason: HOSPADM

## 2020-11-27 RX ADMIN — SODIUM CHLORIDE, PRESERVATIVE FREE 10 ML: 5 INJECTION INTRAVENOUS at 08:50

## 2020-11-27 RX ADMIN — IOPAMIDOL 75 ML: 755 INJECTION, SOLUTION INTRAVENOUS at 08:50

## 2020-12-03 ENCOUNTER — HOSPITAL ENCOUNTER (OUTPATIENT)
Dept: SLEEP CENTER | Age: 30
Discharge: HOME | End: 2020-12-03
Payer: COMMERCIAL

## 2020-12-03 PROCEDURE — 9990000010 HC NO CHARGE VISIT

## 2020-12-03 PROCEDURE — 99423 OL DIG E/M SVC 21+ MIN: CPT | Performed by: INTERNAL MEDICINE

## 2020-12-03 RX ORDER — LIOTHYRONINE SODIUM 5 UG/1
5 TABLET ORAL 2 TIMES DAILY
COMMUNITY

## 2020-12-03 ASSESSMENT — ENCOUNTER SYMPTOMS
ABDOMINAL PAIN: 0
COUGH: 0
EYE ITCHING: 0
ABDOMINAL DISTENTION: 0
BACK PAIN: 0
SHORTNESS OF BREATH: 0
EYE DISCHARGE: 0

## 2020-12-03 NOTE — PROGRESS NOTES
Darya Cooper  1990  Referring Provider: CAM Lala    Subjective:     Chief Complaint   Patient presents with    Sleep Apnea    2 Week Follow-Up       HPI  Neena Healy is a 27 y.o. female is doing a Video follow up visit today. She had a HST done on 11/23/2020 and I showed that she has an AHI of 10 and desat to 84%. She has lost about 5 lbs since her last visit. She is sleepy and tired during the day time. Current Outpatient Medications   Medication Sig Dispense Refill    liothyronine (CYTOMEL) 5 MCG tablet Take 5 mcg by mouth 2 times daily Taking for 15 days      calcium carbonate (TUMS) 500 MG chewable tablet Take 1 tablet by mouth daily as needed for Heartburn      BLISOVI FE 1/20 1-20 MG-MCG per tablet Take 1 tablet by mouth daily 1 packet 2    levothyroxine (SYNTHROID) 175 MCG tablet Take 175 mcg by mouth Daily      escitalopram (LEXAPRO) 10 MG tablet Take 1 tablet by mouth daily (Patient taking differently: Take 20 mg by mouth daily ) 30 tablet 5    pantoprazole (PROTONIX) 40 MG tablet Take 40 mg by mouth nightly       cyclobenzaprine (FLEXERIL) 10 MG tablet Take 10 mg by mouth 3 times daily as needed for Muscle spasms      naproxen (NAPROSYN) 500 MG tablet Take 1 tablet by mouth 2 times daily as needed for Pain 14 tablet 0     No current facility-administered medications for this encounter.         Allergies   Allergen Reactions    Cinnamon Flavor Anaphylaxis     \"allergic to cinnamon candy and cinnamon extract\"\"hives up an down my throat    Codeine Swelling    Pcn [Penicillins] Other (See Comments)     \"had black bruises \"    Cheese      Gi upset       Past Medical History:   Diagnosis Date    Biliary dyskinesia     Cholecystectomy    Chest pain 10/2020    Chronic foot pain, right     Depression     Takes Lexapro    Dizziness 10/2020    Elevated blood pressure reading in office without diagnosis of hypertension 86/51/9661    Follicular thyroid cancer ([facility] [address] dx 2019 Surgery\"also tx with radioactive iodine\" follow with dr Austin Weiss GERD (gastroesophageal reflux disease)     H/O echocardiogram 11/06/2020    EF 55-60%, Mod LVH.  History of exercise stress test 10/29/2020    Treadmill, Physiological BP response to exercise. ETT non diagnostic as THR is ot achieved. However patient did not have chest   pain & there was no EKG ischemia at 80 % HR.  Hypothyroidism     managed per Dr. Alexandria Dance     Last migraine: 8/11/19    Palpitations 10/2020    PCOS (polycystic ovarian syndrome)     Takes BC    Wears glasses        Past Surgical History:   Procedure Laterality Date    CHOLECYSTECTOMY, LAPAROSCOPIC N/A 8/14/2020    CHOLECYSTECTOMY LAPAROSCOPIC performed by Zeyad Ferro MD at [address]  08/01/2018    FOOT NEUROMA SURGERY Right 7/24/2020    RIGHT FOOT NEUROMA EXCISION performed by Alexsandra Wilkes DPM at [facility] Right 08/01/2018    done with D & C    THYROIDECTOMY Left 8/8/2019    LEFT THYROIDECTOMY performed by Zeyad Ferro MD at [facility] Right 8/26/2019    RIGHT COMPLETION THYROIDECTOMY performed by Zeyad Ferro MD at [address] Marital status:      Spouse name: Not on file    Number of children: Not on file    Years of education: Not on file    Highest education level: Not on file   Occupational History     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[employer])   Social Needs    Financial resource strain: Not on file    Food insecurity     Worry: Not on file     Inability: Not on file    Transportation needs     Medical: Not on file     Non-medical: Not on file   Tobacco Use    Smoking status: Never Smoker    Smokeless tobacco: Never Used   Substance and Sexual Activity    Alcohol use: Yes     Comment: Rarely - 1 x year    Drug use: Never    Sexual activity: Not Currently   Lifestyle    Physical activity     Days per week: Not on file     Minutes per session: Not on file    Stress: Not on file   Relationships    Social connections     Talks on phone: Not on file     Gets together: Not on file     Attends Religion service: Not on file     Active member of club or organization: Not on file     Attends meetings of clubs or organizations: Not on file     Relationship status: Not on file    Intimate partner violence     Fear of current or ex partner: Not on file     Emotionally abused: Not on file     Physically abused: Not on file     Forced sexual activity: Not on file   Other Topics Concern    Not on file   Social History Narrative    Not on file       Review of Systems   Constitutional: Positive for fatigue. HENT: Negative for congestion and postnasal drip. Eyes: Negative for discharge and itching. Respiratory: Negative for cough and shortness of breath. Cardiovascular: Negative for chest pain and leg swelling. Gastrointestinal: Negative for abdominal distention and abdominal pain. Endocrine: Negative for cold intolerance and heat intolerance. Genitourinary: Negative for enuresis and frequency. Musculoskeletal: Negative for arthralgias and back pain. Allergic/Immunologic: Negative for food allergies. Neurological: Negative for light-headedness and headaches. Hematological: Negative for adenopathy. Psychiatric/Behavioral: Negative for agitation and behavioral problems. Objective: There were no vitals taken for this visit. There is no height or weight on file to calculate BMI.   Sleep Medicine 11/5/2020   Sitting and reading 2   Watching TV 3   Sitting, inactive in a public place (e.g. a theatre or a meeting) 0   As a passenger in a car for an hour without a break 3   Lying down to rest in the afternoon when circumstances permit 1   Sitting and talking to someone 0   Sitting quietly after a lunch without alcohol 0   In a car, while stopped for a few minutes in traffic 0   Total score 9 Neck circumference 16.5       Radiology: None    Assessment and Plan     Problem List        Pulmonary Problems    TIGIST (obstructive sleep apnea)     She has mild TIGIST with EDS  Advised to use Auto CPAP  Loose weight         Relevant Orders    DME Order for CPAP as OP       Other    Morbid obesity ([address]     Advised to loose weight with diet and exercise           Relevant Medications    calcium carbonate (TUMS) 500 MG chewable tablet    Hypersomnia     Advised to use Auto CPAP  Loose weight                    No follow-ups on file. Progress notes sent to the referring Provider    Stephon Crump is a 27 y.o. female being evaluated by a Virtual Visit (video visit) encounter to address concerns as mentioned above. A caregiver was present when appropriate. Due to this being a TeleHealth encounter (During [facility] public health emergency), evaluation of the following organ systems was limited: Vitals/Constitutional/EENT/Resp/CV/GI//MS/Neuro/Skin/Heme-Lymph-Imm. Pursuant to the emergency declaration under the [address] [place] [address] and the [employer] and [employer] Act, this Virtual Visit was conducted with patient's (and/or legal guardian's) consent, to reduce the patient's risk of exposure to COVID-19 and provide necessary medical care. The patient (and/or legal guardian) has also been advised to contact this office for worsening conditions or problems, and seek emergency medical treatment and/or call 911 if deemed necessary. Patient identification was verified at the start of the visit: Yes    Total time spent for this encounter: 21 minutes    Services were provided through a video synchronous discussion virtually to substitute for in-person clinic visit. Patient and provider were located at their individual homes.     --Carlos Domínguez MD on 12/3/2020 at 10:08 AM    An electronic signature was used to authenticate this note.     Gilford Lime MD  12/3/2020  10:08 AM

## 2020-12-04 ENCOUNTER — TELEPHONE (OUTPATIENT)
Dept: SLEEP CENTER | Age: 30
End: 2020-12-04

## 2020-12-04 ENCOUNTER — OFFICE VISIT (OUTPATIENT)
Dept: BARIATRICS/WEIGHT MGMT | Age: 30
End: 2020-12-04
Payer: COMMERCIAL

## 2020-12-04 VITALS
TEMPERATURE: 97.2 F | DIASTOLIC BLOOD PRESSURE: 96 MMHG | HEIGHT: 64 IN | SYSTOLIC BLOOD PRESSURE: 138 MMHG | BODY MASS INDEX: 50.02 KG/M2 | OXYGEN SATURATION: 97 % | WEIGHT: 293 LBS | HEART RATE: 79 BPM

## 2020-12-04 PROBLEM — E66.01 MORBID OBESITY WITH BMI OF 50.0-59.9, ADULT (HCC): Status: ACTIVE | Noted: 2020-12-04

## 2020-12-04 PROCEDURE — 99213 OFFICE O/P EST LOW 20 MIN: CPT | Performed by: SURGERY

## 2020-12-04 ASSESSMENT — ENCOUNTER SYMPTOMS
PHOTOPHOBIA: 0
TROUBLE SWALLOWING: 0
COLOR CHANGE: 0
NAUSEA: 0
DIARRHEA: 0
VOICE CHANGE: 0
COUGH: 0
CONSTIPATION: 0
WHEEZING: 0
SORE THROAT: 0
VOMITING: 0
ANAL BLEEDING: 0
SHORTNESS OF BREATH: 0
BLOOD IN STOOL: 0
ABDOMINAL PAIN: 0

## 2020-12-04 NOTE — TELEPHONE ENCOUNTER
We received a call from [facility] stating that they don't take her insurance for apap setup. I called and spoke with Yasmin Tapia and informed her we would have to send her orders to [facility] instead and she was okay with that.

## 2020-12-04 NOTE — PROGRESS NOTES
Results for the most recent sleep study on [employer]  1990 are finalized and available. Please see media tab.     Electronically signed by Rick West on 12/3/2020 at 7:53 PM

## 2021-02-09 ENCOUNTER — TELEPHONE (OUTPATIENT)
Dept: INTERNAL MEDICINE CLINIC | Age: 31
End: 2021-02-09
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
## 2021-03-16 ENCOUNTER — OFFICE VISIT (OUTPATIENT)
Dept: CARDIOLOGY CLINIC | Age: 31
End: 2021-03-16
Payer: COMMERCIAL

## 2021-03-16 VITALS
HEART RATE: 72 BPM | BODY MASS INDEX: 49.85 KG/M2 | DIASTOLIC BLOOD PRESSURE: 80 MMHG | SYSTOLIC BLOOD PRESSURE: 124 MMHG | HEIGHT: 64 IN | WEIGHT: 292 LBS

## 2021-03-16 DIAGNOSIS — R07.9 CHEST PAIN, UNSPECIFIED TYPE: Primary | ICD-10-CM

## 2021-03-16 DIAGNOSIS — E66.01 MORBID OBESITY WITH BMI OF 50.0-59.9, ADULT (HCC): ICD-10-CM

## 2021-03-16 DIAGNOSIS — R00.2 PALPITATIONS: ICD-10-CM

## 2021-03-16 PROCEDURE — 99214 OFFICE O/P EST MOD 30 MIN: CPT | Performed by: NURSE PRACTITIONER

## 2021-03-16 NOTE — ASSESSMENT & PLAN NOTE
-No arrhythmias seen on 48-hour Holter monitor. Patient reports palpitations and chest pain have improved since starting Lexapro. Recommended patient to abstain from caffeine.

## 2021-03-16 NOTE — ASSESSMENT & PLAN NOTE
-Discussed weight loss intervention. Patient has history of thyroid CA and recently had synthroid increased.

## 2021-03-16 NOTE — ASSESSMENT & PLAN NOTE
-Resolved, stress test and echo within normal limits. Holter monitor did not show any arrhythmias average heart rate 74.

## 2021-03-16 NOTE — LETTER
Elissa Gonzalez  1990  [id number]    Have you had any Chest Pain that is not new? - No    Have you had any Shortness of Breath - No    Have you had any dizziness - Yes  If Yes DO ORTHOSTATIC BP - when do you feel dizzy laying down   How long does it last .2  minutes     Have you had any palpitations that are not new? - No    Is the patient on any of the following medications -no    Do you have any edema - swelling in No      Vein \"LEG PROBLEM Questionnaire\"  1. Do you have prominent leg veins? No   2. Do you have any skin discoloration? No  3. Do you have any healed or active sores? No  4. Do you have swelling of the legs? No  5. Do you have a family history of varicose veins? No  6. Does your profession involve pro-longed        standing or heavy lifting? No  7. Have you been fighting overweight problems? Yes  8. Do you have restless legs? No  9. Do you have any night time cramps? No  10.  Do you have any of the following in your legs:        no     Do you have a surgery or procedure scheduled in the near future - No

## 2021-03-16 NOTE — PROGRESS NOTES
mg by mouth daily ) 30 tablet 5    pantoprazole (PROTONIX) 40 MG tablet Take 40 mg by mouth nightly       liothyronine (CYTOMEL) 5 MCG tablet Take 5 mcg by mouth 2 times daily Taking for 15 days       No current facility-administered medications for this visit. Allergies: Cinnamon flavor, Codeine, Pcn [penicillins], and Cheese  Past Medical History:   Diagnosis Date    Biliary dyskinesia     Cholecystectomy    Chest pain 10/2020    Chronic foot pain, right     Depression     Takes Lexapro    Dizziness 10/2020    Elevated blood pressure reading in office without diagnosis of hypertension 03/58/9157    Follicular thyroid cancer ([facility] [address] dx 2019    Surgery\"also tx with radioactive iodine\" follow with dr Courtney Coronel GERD (gastroesophageal reflux disease)     H/O echocardiogram 11/06/2020    EF 55-60%, Mod LVH.  History of exercise stress test 10/29/2020    Treadmill, Physiological BP response to exercise. ETT non diagnostic as THR is ot achieved. However patient did not have chest   pain & there was no EKG ischemia at 80 % HR.     Hypothyroidism     managed per Dr. Palomo Brewer     Last migraine: 8/11/19    Palpitations 10/2020    PCOS (polycystic ovarian syndrome)     Takes BC    Sleep apnea     Wears glasses      Past Surgical History:   Procedure Laterality Date    CHOLECYSTECTOMY, LAPAROSCOPIC N/A 8/14/2020    CHOLECYSTECTOMY LAPAROSCOPIC performed by Chuck Quintana MD at [address] [place] [address]  08/01/2018    FOOT NEUROMA SURGERY Right 7/24/2020    RIGHT FOOT NEUROMA EXCISION performed by Michael Spears DPM at [place] [address] Right 08/01/2018    done with D & C    THYROIDECTOMY Left 8/8/2019    LEFT THYROIDECTOMY performed by Chuck Quintana MD at [address] [facility] Right 8/26/2019    RIGHT COMPLETION THYROIDECTOMY performed by Chuck Quintana MD at [facility] OR     Family History   Problem Relation Age of Onset    Cancer Mother         Breast  Arthritis Mother     Hypertension Father     COPD Father     High Blood Pressure Father     Asthma Father         copd    Hypertension Sister     Diabetes Other      Social History     Tobacco Use    Smoking status: Never Smoker    Smokeless tobacco: Never Used   Substance Use Topics    Alcohol use: Yes     Comment: Rarely - 1 x year        Review of Systems - History obtained from the patient  General: negative for - fatigue, malaise, night sweats, weight gain  Psychological: negative for - anxiety, depression, sleep disturbances  Ophthalmic: negative for - blurry vision, loss of vision  ENT: negative for - headaches, vertigo, visual changes  Hematological and Lymphatic: negative for - bleeding problems, blood clots, bruising, fatigue or pallor  Endocrine: negative for - malaise/lethargy, palpitations, unexpected weight changes  Respiratory: negative for - cough, orthopnea, shortness of breath or tachypnea  Cardiovascular: negative for - chest pain, dyspnea on exertion, edema or palpitations  Gastrointestinal: no abdominal pain, change in bowel habits, or black or bloody stools  Genito-Urinary: no dysuria, trouble voiding, or hematuria  Musculoskeletal: negative for - gait disturbance, pain, swelling    Neurological: negative for - confusion, dizziness, impaired coordination/balance or numbness/tingling    Objective:      Physical Exam:  /80 (Position: Standing)   Pulse 72   Ht 5' 4\" (1.626 m)   Wt 292 lb (132.5 kg)   BMI 50.12 kg/m²   Wt Readings from Last 3 Encounters:   03/16/21 292 lb (132.5 kg)   12/04/20 299 lb 12.8 oz (136 kg)   11/12/20 (!) 303 lb 6.4 oz (137.6 kg)     Body mass index is 50.12 kg/m². GENERAL - Alert, oriented, pleasant, in no apparent distress. Head unremarkable  Eyes - pupils equal and reactive to light - bilateral conjunctiva are pink: sclera are white   ENT  external ears intact, nose is intact:  tongue is midline pink and moist  Neck - Supple.  No jugular obesity with BMI of 50.0-59.9, adult ([facility] [address]  -Discussed weight loss intervention. Patient has history of thyroid CA and recently had synthroid increased. Patient seen, interviewed and examined. Testing was reviewed. Patient is encouraged to exercise even a brisk walk for 30 minutes at least 3 to 4 times a week. Lifestyle and risk factor modificatons discussed. Various goals are discussed and questions answered. Continue current medications. Appropriate prescriptions are addressed. Questions answered and patient verbalizes understanding. Call for any problems, questions, or concerns. Pt is to follow up in 6 months for Cardiac management    Electronically signed by Tita Otero.  CAM Chahal CNP on 3/16/2021 at 8:35 AM

## 2021-04-30 ENCOUNTER — VIRTUAL VISIT (OUTPATIENT)
Dept: PULMONOLOGY | Age: 31
End: 2021-04-30
Payer: COMMERCIAL

## 2021-04-30 DIAGNOSIS — G47.33 OSA (OBSTRUCTIVE SLEEP APNEA): ICD-10-CM

## 2021-04-30 DIAGNOSIS — E66.01 MORBID OBESITY WITH BMI OF 50.0-59.9, ADULT (HCC): ICD-10-CM

## 2021-04-30 DIAGNOSIS — G47.10 HYPERSOMNIA: ICD-10-CM

## 2021-04-30 PROCEDURE — 99443 PR PHYS/QHP TELEPHONE EVALUATION 21-30 MIN: CPT | Performed by: INTERNAL MEDICINE

## 2021-04-30 ASSESSMENT — ENCOUNTER SYMPTOMS
SHORTNESS OF BREATH: 0
ABDOMINAL PAIN: 0
COUGH: 0
EYE DISCHARGE: 0
ABDOMINAL DISTENTION: 0
EYE ITCHING: 0
BACK PAIN: 0

## 2021-04-30 NOTE — PROGRESS NOTES
year    Drug use: Never    Sexual activity: Not Currently   Lifestyle    Physical activity     Days per week: Not on file     Minutes per session: Not on file    Stress: Not on file   Relationships    Social connections     Talks on phone: Not on file     Gets together: Not on file     Attends Ca[employer] service: Not on file     Active member of club or organization: Not on file     Attends meetings of clubs or organizations: Not on file     Relationship status: Not on file    Intimate partner violence     Fear of current or ex partner: Not on file     Emotionally abused: Not on file     Physically abused: Not on file     Forced sexual activity: Not on file   Other Topics Concern    Not on file   Social History Narrative    Not on file       Review of Systems   Constitutional: Negative for fatigue. HENT: Negative for congestion and postnasal drip. Eyes: Negative for discharge and itching. Respiratory: Negative for cough and shortness of breath. Cardiovascular: Negative for chest pain and leg swelling. Gastrointestinal: Negative for abdominal distention and abdominal pain. Endocrine: Negative for cold intolerance and heat intolerance. Genitourinary: Negative for enuresis and frequency. Musculoskeletal: Negative for arthralgias and back pain. Allergic/Immunologic: Negative for environmental allergies and food allergies. Neurological: Negative for light-headedness and headaches. Hematological: Negative for adenopathy. Psychiatric/Behavioral: Negative for agitation and behavioral problems. Objective: There were no vitals taken for this visit. There is no height or weight on file to calculate BMI.   Sleep Medicine 11/5/2020   Sitting and reading 2   Watching TV 3   Sitting, inactive in a public place (e.g. a theatre or a meeting) 0   As a passenger in a car for an hour without a break 3   Lying down to rest in the afternoon when circumstances permit 1   Sitting and talking to someone 0   Sitting quietly after a lunch without alcohol 0   In a car, while stopped for a few minutes in traffic 0   Total score 9   Neck circumference 16.5       Radiology: None    Assessment and Plan     Problem List        Pulmonary Problems    TIGIST (obstructive sleep apnea)     Advised to be complaint with the CPAP  Loose weight            Other    Hypersomnia     Advised to be compliant with the CPAP  Loose weight         Morbid obesity with BMI of 50.0-59.9, adult ([address]     Advised to loose weight with diet and exercise           Relevant Medications    calcium carbonate (TUMS) 500 MG chewable tablet               Return in about 1 year (around 4/30/2022) for 2 week download data. Progress notes sent to the referring Provider    Ashleydavid Sinha is a 27 y.o. female being evaluated by a Virtual Visit (video visit) encounter to address concerns as mentioned above. A caregiver was present when appropriate. Due to this being a TeleHealth encounter (During [facility]- public health emergency), evaluation of the following organ systems was limited: Vitals/Constitutional/EENT/Resp/CV/GI//MS/Neuro/Skin/Heme-Lymph-Imm. Pursuant to the emergency declaration under the [facility], [address] [place] [address] authority and the [employer] and [employer] Act, this Virtual Visit was conducted with patient's (and/or legal guardian's) consent, to reduce the patient's risk of exposure to COVID-19 and provide necessary medical care. The patient (and/or legal guardian) has also been advised to contact this office for worsening conditions or problems, and seek emergency medical treatment and/or call 911 if deemed necessary. Patient identification was verified at the start of the visit: Yes    Total time spent for this encounter: 21 minutes    Services were provided through a video synchronous discussion virtually to substitute for in-person clinic visit.  Patient

## 2021-11-05 ENCOUNTER — HOSPITAL ENCOUNTER (OUTPATIENT)
Age: 31
Discharge: HOME | End: 2021-11-05
Payer: COMMERCIAL

## 2021-11-05 PROCEDURE — 84075 ASSAY ALKALINE PHOSPHATASE: CPT

## 2021-11-05 PROCEDURE — 86003 ALLG SPEC IGE CRUDE XTRC EA: CPT

## 2021-11-05 PROCEDURE — 36415 COLL VENOUS BLD VENIPUNCTURE: CPT

## 2021-11-08 LAB
2000687N OAK TREE IGE: <0.1 KU/L
ALLERGEN ASPERGILLUS FUMIGATUS IGE: <0.1 KU/L
ALLERGEN BERMUDA GRASS IGE: <0.1 KU/L
ALLERGEN BIRCH IGE: <0.1 KU/L
ALLERGEN CAT DANDER IGE: <0.1 KU/L
ALLERGEN COMMON SHORT RAGWEED IGE: <0.1 KU/L
ALLERGEN DOG DANDER IGE: <0.1 KU/L
ALLERGEN ELM IGE: <0.1 KU/L
ALLERGEN FUNGI/MOLD A. ALTERNATA IGE: <0.1 KU/L
ALLERGEN FUNGI/MOLD M.RACEMOSUS IGE: <0.1 KU/L
ALLERGEN GERMAN COCKROACH IGE: <0.1 KU/L
ALLERGEN HORMODENDRUM HORDEI IGE: <0.1 KU/L
ALLERGEN MAPLE/BOX ELDER IGE: <0.1 KU/L
ALLERGEN MITE DUST FARINAE IGE: <0.1 KU/L
ALLERGEN MITE DUST PTERONYSSINUS IGE: <0.1 KU/L
ALLERGEN MOUNTAIN CEDAR: <0.1 KU/L
ALLERGEN MOUSE EPITHELIA IGE: <0.1 KU/L
ALLERGEN PECAN TREE IGE: <0.1 KU/L
ALLERGEN PENICILLIUM NOTATUM: <0.1 KU/L
ALLERGEN PIGWEED ROUGH IGE: <0.1 KU/L
ALLERGEN RUSSIAN THISTLE IGE: <0.1 KU/L
ALLERGEN SHEEP SORREL (W18) IGE: <0.1 KU/L
ALLERGEN TIMOTHY GRASS: <0.1 KU/L
ALLERGEN TREE SYCAMORE: <0.1 KU/L
ALLERGEN WALNUT TREE IGE: <0.1 KU/L
ALLERGEN WHITE MULBERRY TREE, IGE: <0.1 KU/L
ALLERGEN, TREE, WHITE ASH IGE: <0.1 KU/L
COTTONWOOD TREE: <0.1 KU/L
IMMUNOCAP SCORE: NORMAL
IMMUNOGLOBULIN E: 29 KU/L

## 2022-04-29 ENCOUNTER — OFFICE VISIT (OUTPATIENT)
Dept: PULMONOLOGY | Age: 32
End: 2022-04-29
Payer: COMMERCIAL

## 2022-04-29 VITALS
SYSTOLIC BLOOD PRESSURE: 126 MMHG | HEIGHT: 64 IN | BODY MASS INDEX: 50.02 KG/M2 | OXYGEN SATURATION: 96 % | HEART RATE: 97 BPM | DIASTOLIC BLOOD PRESSURE: 82 MMHG | WEIGHT: 293 LBS

## 2022-04-29 DIAGNOSIS — G47.10 HYPERSOMNIA: ICD-10-CM

## 2022-04-29 DIAGNOSIS — E66.01 MORBID OBESITY WITH BMI OF 50.0-59.9, ADULT (HCC): ICD-10-CM

## 2022-04-29 DIAGNOSIS — G47.33 OSA (OBSTRUCTIVE SLEEP APNEA): ICD-10-CM

## 2022-04-29 PROCEDURE — 99213 OFFICE O/P EST LOW 20 MIN: CPT | Performed by: INTERNAL MEDICINE

## 2022-04-29 RX ORDER — ALBUTEROL SULFATE 90 UG/1
1 AEROSOL, METERED RESPIRATORY (INHALATION) EVERY 6 HOURS PRN
COMMUNITY
Start: 2021-05-10

## 2022-04-29 ASSESSMENT — ENCOUNTER SYMPTOMS
EYE ITCHING: 0
ABDOMINAL DISTENTION: 0
EYE DISCHARGE: 0
ABDOMINAL PAIN: 0
BACK PAIN: 0
COUGH: 0
SHORTNESS OF BREATH: 0

## 2022-04-29 ASSESSMENT — SLEEP AND FATIGUE QUESTIONNAIRES
ESS TOTAL SCORE: 7
HOW LIKELY ARE YOU TO NOD OFF OR FALL ASLEEP WHILE SITTING QUIETLY AFTER LUNCH WITHOUT ALCOHOL: 1
HOW LIKELY ARE YOU TO NOD OFF OR FALL ASLEEP WHILE SITTING INACTIVE IN A PUBLIC PLACE: 1
HOW LIKELY ARE YOU TO NOD OFF OR FALL ASLEEP IN A CAR, WHILE STOPPED FOR A FEW MINUTES IN TRAFFIC: 0
HOW LIKELY ARE YOU TO NOD OFF OR FALL ASLEEP WHILE WATCHING TV: 3
HOW LIKELY ARE YOU TO NOD OFF OR FALL ASLEEP WHILE LYING DOWN TO REST IN THE AFTERNOON WHEN CIRCUMSTANCES PERMIT: 0
HOW LIKELY ARE YOU TO NOD OFF OR FALL ASLEEP WHILE SITTING AND TALKING TO SOMEONE: 0
HOW LIKELY ARE YOU TO NOD OFF OR FALL ASLEEP WHILE SITTING AND READING: 1
NECK CIRCUMFERENCE (INCHES): 18
HOW LIKELY ARE YOU TO NOD OFF OR FALL ASLEEP WHEN YOU ARE A PASSENGER IN A CAR FOR AN HOUR WITHOUT A BREAK: 1

## 2022-04-29 NOTE — PROGRESS NOTES
Zelalem More  1990  Referring Provider: CAM Ly CNP    Subjective:     Chief Complaint   Patient presents with    1 Year Follow Up     Patient reports having issues with weight loss. She is still waiting for a cpap replacement. SPENCER Coyd is a 32 y.o. female has come back as a follow up. She has mild TIGIST with EDS. She is on the Auto CPAP which she is using it every night about 7 to 8 hours. She says that it is helping her. She has no loss of weight. She is occasionally tired during the day time. Her 2 week download data showed that her residual AHI is 1.3 and lkeak is 4 L/min. Her 95th percentile pressure is 9.5 cm h20. She has a FFM    Current Outpatient Medications   Medication Sig Dispense Refill    albuterol sulfate  (90 Base) MCG/ACT inhaler Inhale 1 puff into the lungs every 6 hours as needed      CPAP Machine MISC by Does not apply route      cyclobenzaprine (FLEXERIL) 10 MG tablet Take 10 mg by mouth 3 times daily as needed for Muscle spasms      calcium carbonate (TUMS) 500 MG chewable tablet Take 1 tablet by mouth daily as needed for Heartburn      BLISOVI FE 1/20 1-20 MG-MCG per tablet Take 1 tablet by mouth daily 1 packet 2    levothyroxine (SYNTHROID) 200 MCG tablet Take 200 mcg by mouth Daily       escitalopram (LEXAPRO) 10 MG tablet Take 1 tablet by mouth daily (Patient taking differently: Take 20 mg by mouth daily ) 30 tablet 5    liothyronine (CYTOMEL) 5 MCG tablet Take 5 mcg by mouth 2 times daily Taking for 15 days (Patient not taking: Reported on 4/29/2022)      pantoprazole (PROTONIX) 40 MG tablet Take 40 mg by mouth nightly  (Patient not taking: Reported on 4/29/2022)       No current facility-administered medications for this visit.        Allergies   Allergen Reactions    Cinnamon Flavor Anaphylaxis     \"allergic to cinnamon candy and cinnamon extract\"\"hives up an down my throat    Codeine Swelling    Epinephrine      shakes    Pcn [Penicillins] Other (See Comments)     \"had black bruises \"    Silicone      rash    Sulfa Antibiotics     Cheese      Gi upset       Past Medical History:   Diagnosis Date    Biliary dyskinesia     Cholecystectomy    Chest pain 10/2020    Chronic foot pain, right     Depression     Takes Lexapro    Dizziness 10/2020    Elevated blood pressure reading in office without diagnosis of hypertension 61/74/6629    Follicular thyroid cancer ([facility] [address] dx 2019    Surgery\"also tx with radioactive iodine\" follow with dr Belkis Heck GERD (gastroesophageal reflux disease)     H/O echocardiogram 11/06/2020    EF 55-60%, Mod LVH.  History of exercise stress test 10/29/2020    Treadmill, Physiological BP response to exercise. ETT non diagnostic as THR is ot achieved. However patient did not have chest   pain & there was no EKG ischemia at 80 % HR.  Hypothyroidism     managed per Dr. Nikolai Jaime migraine: 8/11/19    Palpitations 10/2020    PCOS (polycystic ovarian syndrome)     Takes BC    Sleep apnea     Wears glasses        Past Surgical History:   Procedure Laterality Date    CHOLECYSTECTOMY, LAPAROSCOPIC N/A 8/14/2020    CHOLECYSTECTOMY LAPAROSCOPIC performed by Kat Hawkins MD at [address] [place] [address]  08/01/2018    FOOT NEUROMA SURGERY Right 7/24/2020    RIGHT FOOT NEUROMA EXCISION performed by Rene Au DPM at [place] [address] Right 08/01/2018    done with D & C    THYROIDECTOMY Left 8/8/2019    LEFT THYROIDECTOMY performed by Kat Hawkins MD at [address] Right 8/26/2019    RIGHT COMPLETION THYROIDECTOMY performed by Kat Hawkins MD at [address] Marital status:      Spouse name: None    Number of children: None    Years of education: None    Highest education level: None   Occupational History     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[employer])   Tobacco Use    Smoking status: Never Smoker    Smokeless tobacco: Never Used   Vaping Use    Vaping Use: Never used   Substance and Sexual Activity    Alcohol use: Yes     Comment: Rarely - 1 x year    Drug use: Never    Sexual activity: Not Currently   Other Topics Concern    None   Social History Narrative    None     Social Determinants of Health     Financial Resource Strain:     Difficulty of Paying Living Expenses: Not on file   Food Insecurity:     Worried About Running Out of Food in the Last Year: Not on file    Enrique of Food in the Last Year: Not on file   Transportation Needs:     Lack of Transportation (Medical): Not on file    Lack of Transportation (Non-Medical): Not on file   Physical Activity:     Days of Exercise per Week: Not on file    Minutes of Exercise per Session: Not on file   Stress:     Feeling of Stress : Not on file   Social Connections:     Frequency of Communication with Friends and Family: Not on file    Frequency of Social Gatherings with Friends and Family: Not on file    Attends Restorationist Services: Not on file    Active Member of [address] [place] [address] or Organizations: Not on file    Attends Club or Organization Meetings: Not on file    Marital Status: Not on file   Intimate Partner Violence:     Fear of Current or Ex-Partner: Not on file    Emotionally Abused: Not on file    Physically Abused: Not on file    Sexually Abused: Not on file   Housing Stability:     Unable to Pay for Housing in the Last Year: Not on file    Number of [place] in the Last Year: Not on file    Unstable Housing in the Last Year: Not on file       Review of Systems   Constitutional: Negative for fatigue. HENT: Negative for congestion and postnasal drip. Eyes: Negative for discharge and itching. Respiratory: Negative for cough and shortness of breath. Cardiovascular: Negative for chest pain and leg swelling. Gastrointestinal: Negative for abdominal distention and abdominal pain.    Endocrine: Negative for cold intolerance and heat intolerance. Genitourinary: Negative for enuresis and frequency. Musculoskeletal: Negative for arthralgias and back pain. Allergic/Immunologic: Negative for environmental allergies and food allergies. Neurological: Negative for light-headedness and headaches. Hematological: Negative for adenopathy. Psychiatric/Behavioral: Negative for agitation and behavioral problems. Objective:   /82   Pulse 97   Ht 5' 4\" (1.626 m)   Wt 298 lb (135.2 kg)   SpO2 96%   BMI 51.15 kg/m²   Body mass index is 51.15 kg/m². Sleep Medicine 4/29/2022 11/5/2020   Sitting and reading 1 2   Watching TV 3 3   Sitting, inactive in a public place (e.g. a theatre or a meeting) 1 0   As a passenger in a car for an hour without a break 1 3   Lying down to rest in the afternoon when circumstances permit 0 1   Sitting and talking to someone 0 0   Sitting quietly after a lunch without alcohol 1 0   In a car, while stopped for a few minutes in traffic 0 0   Total score 7 9   Neck circumference (Inches) 18 16.5     Mallampati 3    Physical Exam  Vitals reviewed. Constitutional:       Appearance: Normal appearance. Comments: Morbid Obesity   HENT:      Head: Normocephalic and atraumatic. Nose: Nose normal.      Mouth/Throat:      Mouth: Mucous membranes are moist.   Eyes:      Extraocular Movements: Extraocular movements intact. Pupils: Pupils are equal, round, and reactive to light. Cardiovascular:      Rate and Rhythm: Normal rate and regular rhythm. Pulses: Normal pulses. Heart sounds: Normal heart sounds. Pulmonary:      Effort: Pulmonary effort is normal.      Breath sounds: Normal breath sounds. Abdominal:      General: Abdomen is flat. Palpations: Abdomen is soft. Musculoskeletal:         General: Normal range of motion. Cervical back: Normal range of motion and neck supple. Skin:     General: Skin is warm and dry.    Neurological: General: No focal deficit present. Mental Status: She is alert and oriented to person, place, and time. Psychiatric:         Mood and Affect: Mood normal.         Behavior: Behavior normal.         Radiology: None    Assessment and Plan     Problem List        Respiratory    TIGIST (obstructive sleep apnea)      Advised to be compliant with the CPAP  Loose weight            Other    Hypersomnia      Advised to be compliant with the CPAP  Loose weight         Morbid obesity with BMI of 50.0-59.9, adult (HCC)      Advised to loose weight with diet and exercise           Relevant Medications    calcium carbonate (TUMS) 500 MG chewable tablet               Follow-Up:    Return in about 1 year (around 4/29/2023) for 2 week download data.      Progress notes sent to the referring Provider    Mukesh Rios MD MD  4/29/2022  9:41 AM

## 2022-05-25 LAB
ANION GAP SERPL CALCULATED.3IONS-SCNC: 13 MMOL/L (ref 3–16)
BUN BLDV-MCNC: 8 MG/DL (ref 7–20)
CALCIUM SERPL-MCNC: 9.1 MG/DL (ref 8.3–10.6)
CHLORIDE BLD-SCNC: 101 MMOL/L (ref 99–110)
CO2: 22 MMOL/L (ref 21–32)
CREAT SERPL-MCNC: 0.8 MG/DL (ref 0.6–1.1)
GFR AFRICAN AMERICAN: >60
GFR NON-AFRICAN AMERICAN: >60
GLUCOSE BLD-MCNC: 119 MG/DL (ref 70–99)
POTASSIUM SERPL-SCNC: 4 MMOL/L (ref 3.5–5.1)
SODIUM BLD-SCNC: 136 MMOL/L (ref 136–145)

## 2022-05-31 ENCOUNTER — HOSPITAL ENCOUNTER (OUTPATIENT)
Dept: WOMENS IMAGING | Age: 32
Discharge: HOME | End: 2022-05-31
Payer: COMMERCIAL

## 2022-05-31 ENCOUNTER — HOSPITAL ENCOUNTER (OUTPATIENT)
Dept: ULTRASOUND IMAGING | Age: 32
Discharge: HOME | End: 2022-05-31
Payer: COMMERCIAL

## 2022-05-31 DIAGNOSIS — N63.25 MASS OVERLAPPING MULTIPLE QUADRANTS OF LEFT BREAST: ICD-10-CM

## 2022-05-31 PROCEDURE — 76642 ULTRASOUND BREAST LIMITED: CPT

## 2022-05-31 PROCEDURE — G0279 TOMOSYNTHESIS, MAMMO: HCPCS

## 2022-08-15 LAB
ANION GAP SERPL CALCULATED.3IONS-SCNC: 10 MMOL/L (ref 3–16)
ANTI-THYROGLOB ABS: 18 IU/ML
BUN BLDV-MCNC: 9 MG/DL (ref 7–20)
CALCIUM SERPL-MCNC: 8.5 MG/DL (ref 8.3–10.6)
CHLORIDE BLD-SCNC: 102 MMOL/L (ref 99–110)
CO2: 24 MMOL/L (ref 21–32)
CREAT SERPL-MCNC: 0.7 MG/DL (ref 0.6–1.1)
GFR AFRICAN AMERICAN: >60
GFR NON-AFRICAN AMERICAN: >60
GLUCOSE BLD-MCNC: 99 MG/DL (ref 70–99)
POTASSIUM SERPL-SCNC: 4 MMOL/L (ref 3.5–5.1)
SODIUM BLD-SCNC: 136 MMOL/L (ref 136–145)
T4 FREE: 1.4 NG/DL (ref 0.9–1.8)
TSH SERPL DL<=0.05 MIU/L-ACNC: 1.65 UIU/ML (ref 0.27–4.2)

## 2022-12-09 ENCOUNTER — TELEPHONE (OUTPATIENT)
Dept: PULMONOLOGY | Age: 32
End: 2022-12-09

## 2022-12-09 NOTE — TELEPHONE ENCOUNTER
Patient called to give is info regarding new order for C-PAP. Fax number to [facility] # [phone]    Confirm # [id number]  Serial # [id number]    Waiting for Dr. CONWAY to sign order and then will fax.

## 2023-01-26 NOTE — PROGRESS NOTES
GENERAL SURGERY OFFICE NOTE    SUBJECTIVE:    Patient presenting today referred from Kathy Archibald, CAM - CNP and No ref. provider found, for   Chief Complaint   Patient presents with    Follow-up     1 yr F/U Res CT chest 11/24/20        HPI: Jose Gipson is a 27 y.o. female presenting to f/u CT:  Pt to go over Chest CT and those results 1 yr f/u             Impression    The tiny 4 mm nodule at the left lung base is unchanged.         No acute process or evidence of metastatic disease. Cancer free, f/u prn, counseled re her weight. Wounds very nicely healing, no erythema, no induration, no purulent discharge. No constipation, BMs back to normal.    Thoroughly reviewed the patient's medical history, family history, social history and review of systems with the patient today in the office. Please see medical record for pertinent positives. Past Medical History:   Diagnosis Date    Biliary dyskinesia     Cholecystectomy    Chest pain 10/2020    Chronic foot pain, right     Depression     Takes Lexapro    Dizziness 10/2020    Elevated blood pressure reading in office without diagnosis of hypertension 27/32/6827    Follicular thyroid cancer ([facility] [address] dx 2019    Surgery\"also tx with radioactive iodine\" follow with dr Bibiana Sandoval GERD (gastroesophageal reflux disease)     H/O echocardiogram 11/06/2020    EF 55-60%, Mod LVH.  History of exercise stress test 10/29/2020    Treadmill, Physiological BP response to exercise. ETT non diagnostic as THR is ot achieved. However patient did not have chest   pain & there was no EKG ischemia at 80 % HR.     Hypothyroidism     managed per Dr. Pasquale Marcus     Last migraine: 8/11/19    Palpitations 10/2020    PCOS (polycystic ovarian syndrome)     Takes BC    Wears glasses         Past Surgical History:   Procedure Laterality Date    CHOLECYSTECTOMY, LAPAROSCOPIC N/A 8/14/2020    CHOLECYSTECTOMY LAPAROSCOPIC performed by Constanza Fermin MD at [address] OF UTERUS  08/01/2018    FOOT NEUROMA SURGERY Right 7/24/2020    RIGHT FOOT NEUROMA EXCISION performed by Jonny Heard DPM at [address] Right 08/01/2018    done with D & C    THYROIDECTOMY Left 8/8/2019    LEFT THYROIDECTOMY performed by Inessa Hickman MD at [address] Right 8/26/2019    RIGHT COMPLETION THYROIDECTOMY performed by Inessa Hickman MD at [place] [address] Marital status:      Spouse name: Not on file    Number of children: Not on file    Years of education: Not on file    Highest education level: Not on file   Occupational History     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[employer])   Social Needs    Financial resource strain: Not on file    Food insecurity     Worry: Not on file     Inability: Not on file    Transportation needs     Medical: Not on file     Non-medical: Not on file   Tobacco Use    Smoking status: Never Smoker    Smokeless tobacco: Never Used   Substance and Sexual Activity    Alcohol use: Yes     Comment: Rarely - 1 x year    Drug use: Never    Sexual activity: Not Currently   Lifestyle    Physical activity     Days per week: Not on file     Minutes per session: Not on file    Stress: Not on file   Relationships    Social connections     Talks on phone: Not on file     Gets together: Not on file     Attends Hinduism service: Not on file     Active member of club or organization: Not on file     Attends meetings of clubs or organizations: Not on file     Relationship status: Not on file    Intimate partner violence     Fear of current or ex partner: Not on file     Emotionally abused: Not on file     Physically abused: Not on file     Forced sexual activity: Not on file   Other Topics Concern    Not on file   Social History Narrative    Not on file        Allergies   Allergen Reactions    Cinnamon Flavor Anaphylaxis     \"allergic to cinnamon candy and cinnamon extract\"\"hives up an down my throat    Codeine Swelling    Pcn [Penicillins] Other (See Comments)     \"had black bruises \"    Cheese      Gi upset        Family History   Problem Relation Age of Onset   Gandhi Cancer Mother         Breast    Arthritis Mother     Hypertension Father     COPD Father     High Blood Pressure Father     Asthma Father         copd    Hypertension Sister     Diabetes Other        Current Outpatient Medications   Medication Sig Dispense Refill    liothyronine (CYTOMEL) 5 MCG tablet Take 5 mcg by mouth 2 times daily Taking for 15 days      cyclobenzaprine (FLEXERIL) 10 MG tablet Take 10 mg by mouth 3 times daily as needed for Muscle spasms      calcium carbonate (TUMS) 500 MG chewable tablet Take 1 tablet by mouth daily as needed for Heartburn      BLISOVI FE 1/20 1-20 MG-MCG per tablet Take 1 tablet by mouth daily 1 packet 2    levothyroxine (SYNTHROID) 175 MCG tablet Take 175 mcg by mouth Daily      escitalopram (LEXAPRO) 10 MG tablet Take 1 tablet by mouth daily (Patient taking differently: Take 20 mg by mouth daily ) 30 tablet 5    pantoprazole (PROTONIX) 40 MG tablet Take 40 mg by mouth nightly       naproxen (NAPROSYN) 500 MG tablet Take 1 tablet by mouth 2 times daily as needed for Pain 14 tablet 0     No current facility-administered medications for this visit. Review of Systems   Constitutional: Negative for activity change, chills, diaphoresis and fever. HENT: Negative for sore throat, trouble swallowing and voice change. Eyes: Negative for photophobia and visual disturbance. Respiratory: Negative for cough, shortness of breath and wheezing. Cardiovascular: Negative for chest pain, palpitations and leg swelling. Gastrointestinal: Negative for abdominal pain, anal bleeding, blood in stool, constipation, diarrhea, nausea and vomiting. Endocrine: Negative for cold intolerance, heat intolerance, polydipsia and polyuria. Genitourinary: Negative for dysuria, frequency and hematuria. Musculoskeletal: Negative for joint swelling, myalgias and neck stiffness. Skin: Negative for color change and rash. Neurological: Negative for seizures, speech difficulty, light-headedness and numbness. Hematological: Negative for adenopathy. Does not bruise/bleed easily. OBJECTIVE:    BP (!) 138/96   Pulse 79   Temp 97.2 °F (36.2 °C)   Ht 5' 4\" (1.626 m)   Wt 299 lb 12.8 oz (136 kg)   LMP 12/04/2020   SpO2 97%   BMI 51.46 kg/m²    Body mass index is 51.46 kg/m². Physical Exam  Vitals signs reviewed. Constitutional:       General: She is not in acute distress. Appearance: She is well-developed. She is not diaphoretic. HENT:      Head: Normocephalic and atraumatic. Eyes:      General: No scleral icterus. Conjunctiva/sclera: Conjunctivae normal.      Pupils: Pupils are equal, round, and reactive to light. Neck:      Musculoskeletal: Normal range of motion. Thyroid: No thyromegaly. Vascular: No JVD. Trachea: No tracheal deviation. Cardiovascular:      Rate and Rhythm: Normal rate and regular rhythm. Heart sounds: Normal heart sounds. No murmur. No friction rub. No gallop. Pulmonary:      Effort: Pulmonary effort is normal. No respiratory distress. Breath sounds: No stridor. No wheezing or rales. Chest:      Chest wall: No tenderness. Abdominal:      General: Bowel sounds are normal. There is no distension. Palpations: Abdomen is soft. There is no mass. Tenderness: There is no abdominal tenderness. There is no guarding or rebound. Musculoskeletal: Normal range of motion. General: No tenderness. Lymphadenopathy:      Cervical: No cervical adenopathy. Skin:     General: Skin is warm and dry. Coloration: Skin is not pale. Findings: No erythema or rash. Neurological:      Mental Status: She is alert and oriented to person, place, and time. Cranial Nerves: No cranial nerve deficit. Coordination: Coordination normal.   Psychiatric:         Behavior: Behavior normal.         Thought Content: Thought content normal.         Judgment: Judgment normal.       August 26, 2019     PREOPERATIVE DIAGNOSIS:  H/o recent thyroid Right lobectomy with follicular carcinoma diagnosis. POSTOPERATIVE DIAGNOSIS:  Same. PROCEDURE DONE:  Completion right thyroidectomy. ASSESSMENT & PLAN:    1. H/O thyroidectomy    2. Primary thyroid follicular carcinoma ([address]    3. Morbid obesity with BMI of 50.0-59.9, adult (HCC)         175 synthroid, daily, and Dr Ricci Ellis will follow. Will limit Kcal to 3370-2293 Kcal daily and prtn 60 gm and exercise 30 min. Patient counseled on the risks, benefits, and alternatives of treatment plan at length while in the office today. Patient states an understanding and willingness to proceed with the plan. Follow Up:  Return for PRN - As needed for any new symptom. Celena Cordoba MD, FACS, FICS.     12/4/20 Lab: [id number] Lab: [id number]

## 2023-04-07 ENCOUNTER — OFFICE VISIT (OUTPATIENT)
Dept: PULMONOLOGY | Age: 33
End: 2023-04-07

## 2023-04-07 VITALS
RESPIRATION RATE: 16 BRPM | OXYGEN SATURATION: 96 % | HEART RATE: 86 BPM | BODY MASS INDEX: 50.02 KG/M2 | WEIGHT: 293 LBS | HEIGHT: 64 IN

## 2023-04-07 DIAGNOSIS — E66.01 MORBID OBESITY WITH BMI OF 50.0-59.9, ADULT (HCC): ICD-10-CM

## 2023-04-07 DIAGNOSIS — G47.10 HYPERSOMNIA: ICD-10-CM

## 2023-04-07 DIAGNOSIS — G47.33 OSA (OBSTRUCTIVE SLEEP APNEA): ICD-10-CM

## 2023-04-07 ASSESSMENT — ENCOUNTER SYMPTOMS
SHORTNESS OF BREATH: 0
EYE DISCHARGE: 0
EYE ITCHING: 0
BACK PAIN: 0
COUGH: 0
ABDOMINAL PAIN: 0
ABDOMINAL DISTENTION: 0

## 2023-04-07 NOTE — PROGRESS NOTES
is flat. Palpations: Abdomen is soft. Musculoskeletal:         General: Normal range of motion. Cervical back: Normal range of motion and neck supple. Skin:     General: Skin is warm and dry. Neurological:      General: No focal deficit present. Mental Status: She is alert and oriented to person, place, and time. Psychiatric:         Mood and Affect: Mood normal.         Behavior: Behavior normal.       Radiology: None    Assessment and Plan     Problem List          Respiratory    TIGIST (obstructive sleep apnea)      Advised to be compliant with the CPAP  Loose weight            Other    Hypersomnia      Advised to be compliant with the CPAP  Loose weight           Morbid obesity with BMI of 50.0-59.9, adult (HCC)      Advised to loose weight with diet and exercise           Relevant Medications    calcium carbonate (TUMS) 500 MG chewable tablet            Follow-Up:    Return in about 1 year (around 4/7/2024) for 2 week download data.      Progress notes sent to the referring Provider    Mamadou Henriquez MD MD  4/7/2023  9:31 AM

## 2023-05-15 LAB
ANION GAP SERPL CALCULATED.3IONS-SCNC: 10 MMOL/L (ref 3–16)
BUN SERPL-MCNC: 7 MG/DL (ref 7–20)
CALCIUM SERPL-MCNC: 8.8 MG/DL (ref 8.3–10.6)
CHLORIDE SERPL-SCNC: 101 MMOL/L (ref 99–110)
CO2 SERPL-SCNC: 25 MMOL/L (ref 21–32)
CREAT SERPL-MCNC: 0.8 MG/DL (ref 0.6–1.1)
GFR SERPLBLD CREATININE-BSD FMLA CKD-EPI: >60 ML/MIN/{1.73_M2}
GLUCOSE SERPL-MCNC: 99 MG/DL (ref 70–99)
POTASSIUM SERPL-SCNC: 4.4 MMOL/L (ref 3.5–5.1)
SODIUM SERPL-SCNC: 136 MMOL/L (ref 136–145)
T4 SERPL-MCNC: 10.6 UG/DL (ref 4.5–10.9)
TSH SERPL DL<=0.005 MIU/L-ACNC: 0.1 UIU/ML (ref 0.27–4.2)

## 2024-03-05 ENCOUNTER — OFFICE VISIT (OUTPATIENT)
Dept: NEUROLOGY | Age: 34
End: 2024-03-05
Payer: COMMERCIAL

## 2024-03-05 VITALS
BODY MASS INDEX: 50.02 KG/M2 | HEART RATE: 90 BPM | DIASTOLIC BLOOD PRESSURE: 72 MMHG | SYSTOLIC BLOOD PRESSURE: 122 MMHG | OXYGEN SATURATION: 95 % | HEIGHT: 64 IN | WEIGHT: 293 LBS

## 2024-03-05 DIAGNOSIS — R51.9 CHRONIC DAILY HEADACHE: Primary | ICD-10-CM

## 2024-03-05 DIAGNOSIS — G43.009 MIGRAINE WITHOUT AURA, NOT INTRACTABLE, WITHOUT STATUS MIGRAINOSUS: ICD-10-CM

## 2024-03-05 PROCEDURE — 99245 OFF/OP CONSLTJ NEW/EST HI 55: CPT | Performed by: PSYCHIATRY & NEUROLOGY

## 2024-03-05 RX ORDER — HYDROXYZINE HYDROCHLORIDE 25 MG/1
25 TABLET, FILM COATED ORAL 2 TIMES DAILY PRN
COMMUNITY

## 2024-03-05 RX ORDER — DROSPIRENONE AND ETHINYL ESTRADIOL 0.03MG-3MG
1 KIT ORAL
COMMUNITY

## 2024-03-05 RX ORDER — UBROGEPANT 100 MG/1
TABLET ORAL
Qty: 2 TABLET | Refills: 0 | Status: SHIPPED | COMMUNITY
Start: 2024-03-05

## 2024-03-05 RX ORDER — RIMEGEPANT SULFATE 75 MG/75MG
TABLET, ORALLY DISINTEGRATING ORAL
Qty: 2 TABLET | Refills: 0 | Status: SHIPPED | COMMUNITY
Start: 2024-03-05

## 2024-03-05 RX ORDER — CYCLOBENZAPRINE HCL 10 MG
10 TABLET ORAL 2 TIMES DAILY PRN
COMMUNITY

## 2024-03-05 RX ORDER — TIZANIDINE 2 MG/1
2 TABLET ORAL
Qty: 30 TABLET | Refills: 5 | Status: SHIPPED | OUTPATIENT
Start: 2024-03-05

## 2024-03-05 RX ORDER — SUMATRIPTAN 100 MG/1
100 TABLET, FILM COATED ORAL
COMMUNITY

## 2024-03-05 NOTE — PROGRESS NOTES
precipitating her cephalgia.  I will send her to ophthalmology to get a good eye exam to rule out any papilledema which may raise concern for idiopathic intracranial hypertension (pseudotumor cerebri).  On direct funduscopic exam today the margins of her optic discs were not as crisp I typically see.  Additionally, she does have migraine with aura and is on an estrogen containing birth control which does put her at increased risk of stroke.  Fortunately, within the coming weeks she is going to see a new OB/GYN and will discuss alternative contraceptive strategies with them.    We discussed multiple medications for migraine preventative therapy and ultimately settled upon tizanidine starting at 2 mg at bedtime which can be titrated up further if needed.  Another option we discussed was Topamax.  She gets panic attacks manifesting with shortness of breath, chest pain, and palpitations with sumatriptan.  Do not feel safe trying another triptan drug and therefore I gave her samples of Ubrelvy and Nurtec ODT to trial.  She will let me know which 1 is more effective and I will send in a prescription.    Medications prescribed for the patient were discussed in detail. This included a discussion of the potential risks vs the potential benefits of the medications. The patient was given time to ask questions and these were answered to the best of my ability. The patient appeared to understand the information provided.    We discussed the importance of keeping a detailed headache diary. We discussed trying to identify headache triggers. We discussed the importance of staying well hydrated, eating three regular meals each day, getting plenty of hours of fitful sleep, and reducing stress to help prevent headaches.      >55 minutes total time spent regarding this patient's encounter.  This included obtaining history, performing a neurological medical exam, developing an assessment / plan, and documenting via EMR.      Return in

## 2024-03-08 ENCOUNTER — TELEPHONE (OUTPATIENT)
Dept: NEUROLOGY | Age: 34
End: 2024-03-08

## 2024-03-08 DIAGNOSIS — G43.009 MIGRAINE WITHOUT AURA, NOT INTRACTABLE, WITHOUT STATUS MIGRAINOSUS: Primary | ICD-10-CM

## 2024-03-08 DIAGNOSIS — R51.9 CHRONIC DAILY HEADACHE: ICD-10-CM

## 2024-03-08 RX ORDER — TOPIRAMATE 50 MG/1
50 TABLET, FILM COATED ORAL 2 TIMES DAILY
Qty: 60 TABLET | Refills: 5 | Status: SHIPPED | OUTPATIENT
Start: 2024-03-29

## 2024-03-08 RX ORDER — TOPIRAMATE 25 MG/1
TABLET ORAL
Qty: 60 TABLET | Refills: 0 | Status: SHIPPED | OUTPATIENT
Start: 2024-03-08

## 2024-03-08 NOTE — TELEPHONE ENCOUNTER
Yeah… Seems like she is allergic to it.  Was a good thought to continue it for a few days to see if it was just an immediate brief reaction but if it was getting worse yes she should stop the medication.  Can you please place the medication in her allergy list?  Looking at my documentation another option that we discussed was Topamax.  I can send in the Topamax at this time.  If she has any questions regarding the Topamax please let me know.  It does start with a slower titration over 4 weeks but that will all be on the prescription instructions.

## 2024-03-08 NOTE — TELEPHONE ENCOUNTER
Notified patient to discontinue Tizanidine d/t reaction (added medication to allergy list).  Let patient know Topamax order submitted titration directions will be on bottle.  Patient acknowledged.

## 2024-03-08 NOTE — TELEPHONE ENCOUNTER
Received call from pt stating she saw Dr. Palmer on Tuesday and started Tizanidine that evening. She stated Wednesday am she woke up with a small rash on chest. She stated she has continued the medication and her rash has gotten progressively worse throughout the week. Advised pt to stop medication due to reaction. Please advise.

## 2024-04-06 ENCOUNTER — HOSPITAL ENCOUNTER (OUTPATIENT)
Dept: MRI IMAGING | Age: 34
Discharge: HOME | End: 2024-04-06
Attending: PSYCHIATRY & NEUROLOGY
Payer: COMMERCIAL

## 2024-04-06 DIAGNOSIS — R51.9 CHRONIC DAILY HEADACHE: ICD-10-CM

## 2024-04-06 PROCEDURE — 70553 MRI BRAIN STEM W/O & W/DYE: CPT

## 2024-04-06 PROCEDURE — 6360000004 HC RX CONTRAST MEDICATION: Performed by: PSYCHIATRY & NEUROLOGY

## 2024-04-06 PROCEDURE — A9579 GAD-BASE MR CONTRAST NOS,1ML: HCPCS | Performed by: PSYCHIATRY & NEUROLOGY

## 2024-04-06 RX ADMIN — GADOTERIDOL 20 ML: 279.3 INJECTION, SOLUTION INTRAVENOUS at 12:37

## 2024-04-09 ENCOUNTER — OFFICE VISIT (OUTPATIENT)
Dept: PULMONOLOGY | Age: 34
End: 2024-04-09
Payer: COMMERCIAL

## 2024-04-09 VITALS — OXYGEN SATURATION: 98 % | WEIGHT: 293 LBS | HEIGHT: 64 IN | HEART RATE: 106 BPM | BODY MASS INDEX: 50.02 KG/M2

## 2024-04-09 DIAGNOSIS — G47.10 HYPERSOMNIA: ICD-10-CM

## 2024-04-09 DIAGNOSIS — G47.33 OSA (OBSTRUCTIVE SLEEP APNEA): Primary | ICD-10-CM

## 2024-04-09 DIAGNOSIS — E66.01 MORBID OBESITY WITH BMI OF 50.0-59.9, ADULT (HCC): ICD-10-CM

## 2024-04-09 PROCEDURE — 99214 OFFICE O/P EST MOD 30 MIN: CPT | Performed by: INTERNAL MEDICINE

## 2024-04-09 ASSESSMENT — ENCOUNTER SYMPTOMS
COUGH: 0
BACK PAIN: 0
ABDOMINAL DISTENTION: 0
EYE ITCHING: 0
EYE DISCHARGE: 0
SHORTNESS OF BREATH: 0
ABDOMINAL PAIN: 0

## 2024-04-09 NOTE — PROGRESS NOTES
Elba MENDIOLA Moo  1990  Referring Provider: No PCP    Subjective:     Chief Complaint   Patient presents with    Sleep Apnea     Compliance       HPI  Elba is a 33 y.o. female  has come back as a follow up. She has mild TIGIST with EDS. She is on a Auto CPAP which she is using it every night about 6 to 7 hours. She says that it is helping her. She has 4 lb weight loss. She is less sleepy during the day time.She has a FFM. Her download data showed a residual AHI is 1.8 and leak is 0.0 secs and her 95th percentile pressure is 8.7 cm h20.     Current Outpatient Medications   Medication Sig Dispense Refill    topiramate (TOPAMAX) 50 MG tablet Take 1 tablet by mouth 2 times daily NOTE TO PHARMACY: DO NOT FILL UNTIL 25 MG RX IS COMPLETE 60 tablet 5    drospirenone-ethinyl estradiol (DAVID) 3-0.03 MG TABS Take 1 tablet by mouth Every Day      hydrOXYzine HCl (ATARAX) 25 MG tablet Take 1 tablet by mouth 2 times daily as needed for Itching      cyclobenzaprine (FLEXERIL) 10 MG tablet Take 1 tablet by mouth 2 times daily as needed for Muscle spasms      Vitamin D3 125 MCG (5000 UT) TABS tablet Take 1 tablet by mouth daily      SUMAtriptan (IMITREX) 100 MG tablet Take 1 tablet by mouth once as needed for Migraine      tiZANidine (ZANAFLEX) 2 MG tablet Take 1 tablet by mouth nightly 30 tablet 5    Ubrogepant (UBRELVY) 100 MG TABS LOT# [id number] EXP: 12/25 (2 BOXES) 2 tablet 0    Rimegepant Sulfate (NURTEC) 75 MG TBDP LOT# [id number] EXP: 5/26 (1 PACK) 2 tablet 0    albuterol sulfate  (90 Base) MCG/ACT inhaler Inhale 1 puff into the lungs every 6 hours as needed      CPAP Machine MISC by Does not apply route      calcium carbonate (TUMS) 500 MG chewable tablet Take 1 tablet by mouth daily as needed for Heartburn      levothyroxine (SYNTHROID) 200 MCG tablet Take 1 tablet by mouth Daily      escitalopram (LEXAPRO) 10 MG tablet Take 1 tablet by mouth daily (Patient taking differently: Take 2 tablets by mouth daily) 30 tablet 5

## 2024-06-05 ENCOUNTER — TELEPHONE (OUTPATIENT)
Dept: NEUROLOGY | Age: 34
End: 2024-06-05

## 2024-06-05 ENCOUNTER — OFFICE VISIT (OUTPATIENT)
Dept: NEUROLOGY | Age: 34
End: 2024-06-05
Payer: COMMERCIAL

## 2024-06-05 VITALS
BODY MASS INDEX: 51.77 KG/M2 | HEART RATE: 68 BPM | OXYGEN SATURATION: 99 % | WEIGHT: 293 LBS | DIASTOLIC BLOOD PRESSURE: 70 MMHG | SYSTOLIC BLOOD PRESSURE: 110 MMHG

## 2024-06-05 DIAGNOSIS — R51.9 CHRONIC DAILY HEADACHE: ICD-10-CM

## 2024-06-05 DIAGNOSIS — G43.009 MIGRAINE WITHOUT AURA, NOT INTRACTABLE, WITHOUT STATUS MIGRAINOSUS: Primary | ICD-10-CM

## 2024-06-05 PROCEDURE — 99214 OFFICE O/P EST MOD 30 MIN: CPT | Performed by: NURSE PRACTITIONER

## 2024-06-05 RX ORDER — ETONOGESTREL 68 MG/1
68 IMPLANT SUBCUTANEOUS ONCE
COMMUNITY
Start: 2024-05-09

## 2024-06-05 RX ORDER — ATOGEPANT 60 MG/1
60 TABLET ORAL DAILY
Qty: 30 TABLET | Refills: 5 | Status: SHIPPED | OUTPATIENT
Start: 2024-06-05

## 2024-06-05 RX ORDER — RIMEGEPANT SULFATE 75 MG/75MG
75 TABLET, ORALLY DISINTEGRATING ORAL PRN
Qty: 8 TABLET | Refills: 2 | Status: SHIPPED | OUTPATIENT
Start: 2024-06-05

## 2024-06-05 RX ORDER — RIMEGEPANT SULFATE 75 MG/75MG
TABLET, ORALLY DISINTEGRATING ORAL
Qty: 2 TABLET | Refills: 0 | Status: SHIPPED | COMMUNITY
Start: 2024-06-05

## 2024-06-05 RX ORDER — ATOGEPANT 60 MG/1
TABLET ORAL
Qty: 12 TABLET | Refills: 0 | Status: SHIPPED | COMMUNITY
Start: 2024-06-05

## 2024-06-05 NOTE — PROGRESS NOTES
6/5/24    Elba MENDIOLA Cortés  1990    No chief complaint on file.      History of Present Illness  Elba is a 33 y.o. female presenting today for follow-up of: Migraine without aura, chronic daily headache.    She was referred to ophthalmology to ensure no optic nerve edema. MRI of the brain was normal.  Started on tizanidine 2 mg at bedtime for migraine prevention.  She was to follow-up with her OB/GYN to discuss alternative contraceptives medications.  She is compliant with her CPAP.    She actually had an allergic reaction to tizanidine, this was discontinued and she was started on Topamax 50 mg twice daily.  She was given samples of Nurtec and Ubrelvy to try for abortive therapy.    She had a cardiac workup in 2020 for her lightheadedness was told everything was normal.    She has a history of thyroid cancer, she tells me that her recent TSH was normal-she follows with Dr Schroeder.     She now has a Nexplanon.  She discontinued her combination birth control pill    She is still having migraines daily.    Her BP is 110/70 today, will avoid beta blockers for migraine prevention.  She is also already on Lexapro, will avoid SNRIs, TCAs.    She tried Imitrex before but it gave her a panic attack instantly. Will avoid Maxalt.    Migraine preventatives tried: Flexeril, Lexapro, tizanidine, Topamax  Abortive therapy tried: Nurtec, Imitrex, Ubrelvy    Current Outpatient Medications   Medication Sig Dispense Refill    etonogestrel (NEXPLANON) 68 MG implant 68 mg by Subdermal route once      rimegepant sulfate (NURTEC) 75 MG TBDP Take 75 mg by mouth as needed (Migraine) Not to exceed more than 1 tab in 24 hours 8 tablet 2    Atogepant (QULIPTA) 60 MG TABS Take 60 mg by mouth daily 30 tablet 5    topiramate (TOPAMAX) 50 MG tablet Take 1 tablet by mouth 2 times daily NOTE TO PHARMACY: DO NOT FILL UNTIL 25 MG RX IS COMPLETE 60 tablet 5    hydrOXYzine HCl (ATARAX) 25 MG tablet Take 1 tablet by mouth 2 times daily as needed for

## 2024-06-13 ENCOUNTER — TELEPHONE (OUTPATIENT)
Dept: NEUROLOGY | Age: 34
End: 2024-06-13

## 2024-06-13 NOTE — TELEPHONE ENCOUNTER
Patient called stating she has a rash on her torso going up her neck and believes it may be related to introducing qulipta into her system.  She stated she stopped medication and will let us know if rash resolves as a result.

## 2024-06-17 ENCOUNTER — OFFICE VISIT (OUTPATIENT)
Dept: FAMILY MEDICINE CLINIC | Age: 34
End: 2024-06-17
Payer: COMMERCIAL

## 2024-06-17 VITALS
OXYGEN SATURATION: 98 % | DIASTOLIC BLOOD PRESSURE: 72 MMHG | WEIGHT: 293 LBS | TEMPERATURE: 98.1 F | BODY MASS INDEX: 52.7 KG/M2 | SYSTOLIC BLOOD PRESSURE: 124 MMHG | HEART RATE: 77 BPM

## 2024-06-17 DIAGNOSIS — R21 RASH: Primary | ICD-10-CM

## 2024-06-17 PROCEDURE — 99213 OFFICE O/P EST LOW 20 MIN: CPT | Performed by: PHYSICIAN ASSISTANT

## 2024-06-17 RX ORDER — ESCITALOPRAM OXALATE 20 MG/1
TABLET ORAL
COMMUNITY
Start: 2024-05-09

## 2024-06-17 RX ORDER — TRIAMCINOLONE ACETONIDE 1 MG/G
CREAM TOPICAL
Qty: 28 G | Refills: 0 | Status: SHIPPED | OUTPATIENT
Start: 2024-06-17

## 2024-06-17 RX ORDER — CLOTRIMAZOLE AND BETAMETHASONE DIPROPIONATE 10; .64 MG/G; MG/G
CREAM TOPICAL
Qty: 45 G | Refills: 1 | Status: SHIPPED | OUTPATIENT
Start: 2024-06-17

## 2024-06-17 ASSESSMENT — ENCOUNTER SYMPTOMS
VOMITING: 0
SORE THROAT: 0
CHEST TIGHTNESS: 0
ABDOMINAL PAIN: 0
RHINORRHEA: 0
SHORTNESS OF BREATH: 0
COUGH: 0
DIARRHEA: 0
BACK PAIN: 0
COLOR CHANGE: 0
NAUSEA: 0
WHEEZING: 0
CONSTIPATION: 0
EYE PAIN: 0
PHOTOPHOBIA: 0
EYE DISCHARGE: 0
EYE REDNESS: 0
BLOOD IN STOOL: 0

## 2024-06-17 NOTE — PROGRESS NOTES
Elba Cortés (:  1990) is a 33 y.o. female,Established patient, here for evaluation of the following chief complaint(s):    Rash    This is my first patient encounter with Elba Cortés; chart reviewed.     SUBJECTIVE/OBJECTIVE:  HPI  Elba Cortés is a pleasant 33 y.o. female presenting to clinic today for rash.  Patient reports she first noticed rash to breast area and abdomen about a week ago shortly after cleaning out a turtle tank; reports rash also began a few days after starting Qulipta which she has discontinued after discussing with primary care; reports has been taking Zyrtec twice daily and the rash has not improved; reports it is quite itchy, states has had similar issues previously however never this severe; reports also having areas of erythema and itchiness to bilateral antecubital areas as well as right fingers.  Reports these are pruritic without discharge or pain.  Reports she tried over-the-counter hydrocortisone is not helping and ice packs which are not helping.  Reports remote history of mild eczema.  Denies facial swelling, shortness of breath or other sick symptoms.    Also reports exposure to poison ivy a few days ago with onset of poison ivy rash to left lower leg.    Allergies   Allergen Reactions    Cinnamon Flavor Anaphylaxis     \"allergic to cinnamon candy and cinnamon extract\"\"hives up an down my throat    Tizanidine Rash    Codeine Swelling    Epinephrine      shakes    Pcn [Penicillins] Other (See Comments)     \"had black bruises \"    Silicone      rash    Sulfa Antibiotics     Cheese      Gi upset       Current Outpatient Medications   Medication Sig Dispense Refill    escitalopram (LEXAPRO) 20 MG tablet       triamcinolone (KENALOG) 0.1 % cream Apply topically 2 times daily. 28 g 0    clotrimazole-betamethasone (LOTRISONE) 1-0.05 % cream Apply topically 2 times daily to breast folds and abdomen. 45 g 1    etonogestrel (NEXPLANON) 68 MG implant 68 mg by Subdermal route

## 2024-09-03 ENCOUNTER — OFFICE VISIT (OUTPATIENT)
Dept: NEUROLOGY | Age: 34
End: 2024-09-03
Payer: COMMERCIAL

## 2024-09-03 VITALS
SYSTOLIC BLOOD PRESSURE: 118 MMHG | BODY MASS INDEX: 50.86 KG/M2 | DIASTOLIC BLOOD PRESSURE: 82 MMHG | OXYGEN SATURATION: 98 % | HEART RATE: 77 BPM | WEIGHT: 293 LBS

## 2024-09-03 DIAGNOSIS — R51.9 CHRONIC DAILY HEADACHE: ICD-10-CM

## 2024-09-03 DIAGNOSIS — G43.009 MIGRAINE WITHOUT AURA, NOT INTRACTABLE, WITHOUT STATUS MIGRAINOSUS: Primary | ICD-10-CM

## 2024-09-03 PROCEDURE — 99213 OFFICE O/P EST LOW 20 MIN: CPT | Performed by: NURSE PRACTITIONER

## 2024-09-03 RX ORDER — CYCLOBENZAPRINE HCL 10 MG
10 TABLET ORAL 2 TIMES DAILY PRN
Qty: 30 TABLET | Refills: 1 | Status: SHIPPED | OUTPATIENT
Start: 2024-09-03

## 2024-09-03 RX ORDER — ATOGEPANT 60 MG/1
60 TABLET ORAL DAILY
Qty: 30 TABLET | Refills: 5 | Status: SHIPPED | OUTPATIENT
Start: 2024-09-03

## 2024-09-03 RX ORDER — TOPIRAMATE 50 MG/1
50 TABLET, FILM COATED ORAL 2 TIMES DAILY
Qty: 60 TABLET | Refills: 5 | Status: SHIPPED | OUTPATIENT
Start: 2024-09-03

## 2024-09-03 NOTE — PROGRESS NOTES
9/3/24    Elba MENDIOLA Moo  1990    Chief Complaint   Patient presents with    Follow-up     3m follow up for Migraine without aura, not intractable, without status migrainosus       History of Present Illness  Elba is a 33 y.o. female presenting today for follow-up of: Migraine without aura, chronic daily headache.    Workup has included normal ophthalmology exam and normal MRI brain..  She also switched from combination birth control to Nexplanon.      She is compliant with her CPAP for TIGIST.     She is on Topamax 50 mg twice daily and Qulipta 60 mg daily for migraine prevention and Nurtec for abortive therapy.  She tells me her migraines are better controlled, she gets up 3 migraines per week but they are less intense, Nurtec helps knock them out.  She did asked me to refill her Flexeril that was prescribed by her previous provider for tension headaches.    Migraine preventatives tried: Flexeril, Lexapro, tizanidine, Topamax  Abortive therapy tried: Nurtec, Imitrex, Ubrelvy     Current Outpatient Medications   Medication Sig Dispense Refill    Atogepant (QULIPTA) 60 MG TABS Take 60 mg by mouth daily 30 tablet 5    topiramate (TOPAMAX) 50 MG tablet Take 1 tablet by mouth 2 times daily NOTE TO PHARMACY: DO NOT FILL UNTIL 25 MG RX IS COMPLETE 60 tablet 5    cyclobenzaprine (FLEXERIL) 10 MG tablet Take 1 tablet by mouth 2 times daily as needed for Muscle spasms 30 tablet 1    escitalopram (LEXAPRO) 20 MG tablet       triamcinolone (KENALOG) 0.1 % cream Apply topically 2 times daily. 28 g 0    clotrimazole-betamethasone (LOTRISONE) 1-0.05 % cream Apply topically 2 times daily to breast folds and abdomen. 45 g 1    etonogestrel (NEXPLANON) 68 MG implant 68 mg by Subdermal route once      rimegepant sulfate (NURTEC) 75 MG TBDP Take 75 mg by mouth as needed (Migraine) Not to exceed more than 1 tab in 24 hours 8 tablet 2    hydrOXYzine HCl (ATARAX) 25 MG tablet Take 1 tablet by mouth 2 times daily as needed for Itching

## 2024-09-20 ENCOUNTER — OFFICE VISIT (OUTPATIENT)
Dept: FAMILY MEDICINE CLINIC | Age: 34
End: 2024-09-20
Payer: COMMERCIAL

## 2024-09-20 VITALS
HEART RATE: 82 BPM | DIASTOLIC BLOOD PRESSURE: 86 MMHG | WEIGHT: 293 LBS | BODY MASS INDEX: 51.49 KG/M2 | OXYGEN SATURATION: 98 % | SYSTOLIC BLOOD PRESSURE: 122 MMHG | TEMPERATURE: 98.7 F

## 2024-09-20 DIAGNOSIS — H66.014 RECURRENT ACUTE SUPPURATIVE OTITIS MEDIA OF RIGHT EAR WITH SPONTANEOUS RUPTURE OF TYMPANIC MEMBRANE: Primary | ICD-10-CM

## 2024-09-20 PROCEDURE — 99213 OFFICE O/P EST LOW 20 MIN: CPT | Performed by: PHYSICIAN ASSISTANT

## 2024-09-20 RX ORDER — DOXYCYCLINE HYCLATE 100 MG
100 TABLET ORAL 2 TIMES DAILY
Qty: 20 TABLET | Refills: 0 | Status: SHIPPED | OUTPATIENT
Start: 2024-09-20 | End: 2024-09-30

## 2024-09-20 RX ORDER — FLUTICASONE PROPIONATE 50 MCG
2 SPRAY, SUSPENSION (ML) NASAL DAILY
Qty: 16 G | Refills: 0 | Status: SHIPPED | OUTPATIENT
Start: 2024-09-20

## 2024-09-20 RX ORDER — CIPROFLOXACIN AND DEXAMETHASONE 3; 1 MG/ML; MG/ML
4 SUSPENSION/ DROPS AURICULAR (OTIC) 2 TIMES DAILY
Qty: 7.5 ML | Refills: 0 | Status: SHIPPED | OUTPATIENT
Start: 2024-09-20 | End: 2024-09-27

## 2024-09-20 ASSESSMENT — ENCOUNTER SYMPTOMS
COLOR CHANGE: 0
WHEEZING: 0
CONSTIPATION: 0
COUGH: 1
NAUSEA: 0
SINUS PRESSURE: 1
DIARRHEA: 0
EYE DISCHARGE: 0
PHOTOPHOBIA: 0
SORE THROAT: 0
BLOOD IN STOOL: 0
CHEST TIGHTNESS: 0
RHINORRHEA: 0
EYE PAIN: 0
VOMITING: 0
BACK PAIN: 0
EYE REDNESS: 0
SINUS PAIN: 1
SHORTNESS OF BREATH: 0
ABDOMINAL PAIN: 0

## 2024-12-30 ENCOUNTER — OFFICE VISIT (OUTPATIENT)
Dept: FAMILY MEDICINE CLINIC | Age: 34
End: 2024-12-30
Payer: COMMERCIAL

## 2024-12-30 VITALS
WEIGHT: 293 LBS | HEART RATE: 75 BPM | TEMPERATURE: 98.1 F | OXYGEN SATURATION: 98 % | SYSTOLIC BLOOD PRESSURE: 118 MMHG | DIASTOLIC BLOOD PRESSURE: 80 MMHG | BODY MASS INDEX: 51.01 KG/M2

## 2024-12-30 DIAGNOSIS — J32.9 SINUSITIS, UNSPECIFIED CHRONICITY, UNSPECIFIED LOCATION: Primary | ICD-10-CM

## 2024-12-30 PROCEDURE — 99213 OFFICE O/P EST LOW 20 MIN: CPT | Performed by: PHYSICIAN ASSISTANT

## 2024-12-30 RX ORDER — BENZONATATE 200 MG/1
200 CAPSULE ORAL 3 TIMES DAILY PRN
Qty: 30 CAPSULE | Refills: 0 | Status: SHIPPED | OUTPATIENT
Start: 2024-12-30 | End: 2025-01-06

## 2024-12-30 RX ORDER — DEXTROMETHORPHAN HYDROBROMIDE AND PROMETHAZINE HYDROCHLORIDE 15; 6.25 MG/5ML; MG/5ML
5 SYRUP ORAL NIGHTLY PRN
Qty: 118 ML | Refills: 0 | Status: SHIPPED | OUTPATIENT
Start: 2024-12-30 | End: 2025-01-06

## 2024-12-30 RX ORDER — DOXYCYCLINE HYCLATE 100 MG
100 TABLET ORAL 2 TIMES DAILY
Qty: 20 TABLET | Refills: 0 | Status: SHIPPED | OUTPATIENT
Start: 2024-12-30 | End: 2025-01-09

## 2024-12-30 ASSESSMENT — ENCOUNTER SYMPTOMS
NAUSEA: 0
CHEST TIGHTNESS: 0
SINUS PAIN: 1
VOMITING: 0
EYE PAIN: 0
COLOR CHANGE: 0
COUGH: 1
WHEEZING: 0
DIARRHEA: 0
EYE REDNESS: 0
RHINORRHEA: 0
PHOTOPHOBIA: 0
ABDOMINAL PAIN: 0
SINUS PRESSURE: 1
BLOOD IN STOOL: 0
SORE THROAT: 0
EYE DISCHARGE: 0
SHORTNESS OF BREATH: 0
CONSTIPATION: 0
BACK PAIN: 0

## 2024-12-30 NOTE — PROGRESS NOTES
(FLEXERIL) 10 MG tablet Take 1 tablet by mouth 2 times daily as needed for Muscle spasms 30 tablet 1    escitalopram (LEXAPRO) 20 MG tablet       etonogestrel (NEXPLANON) 68 MG implant 68 mg by Subdermal route once      rimegepant sulfate (NURTEC) 75 MG TBDP Take 75 mg by mouth as needed (Migraine) Not to exceed more than 1 tab in 24 hours 8 tablet 2    albuterol sulfate  (90 Base) MCG/ACT inhaler Inhale 1 puff into the lungs every 6 hours as needed      CPAP Machine MISC by Does not apply route      calcium carbonate (TUMS) 500 MG chewable tablet Take 1 tablet by mouth daily as needed for Heartburn      levothyroxine (SYNTHROID) 200 MCG tablet Take 1 tablet by mouth Daily       No current facility-administered medications for this visit.       /80   Pulse 75   Temp 98.1 °F (36.7 °C) (Temporal)   Wt 134.8 kg (297 lb 3.2 oz)   SpO2 98%   BMI 51.01 kg/m²     Review of Systems   Constitutional:  Negative for appetite change, chills, fatigue and fever.   HENT:  Positive for congestion, ear pain, postnasal drip, sinus pressure and sinus pain. Negative for hearing loss, rhinorrhea and sore throat.    Eyes:  Negative for photophobia, pain, discharge and redness.   Respiratory:  Positive for cough. Negative for chest tightness, shortness of breath and wheezing.    Cardiovascular:  Negative for chest pain, palpitations and leg swelling.   Gastrointestinal:  Negative for abdominal pain, blood in stool, constipation, diarrhea, nausea and vomiting.   Endocrine: Negative for polyuria.   Genitourinary:  Negative for difficulty urinating, dysuria, flank pain, frequency, hematuria and urgency.   Musculoskeletal:  Negative for arthralgias, back pain, gait problem and joint swelling.   Skin:  Negative for color change and rash.   Neurological:  Negative for dizziness, syncope, weakness, light-headedness and headaches.   Hematological:  Negative for adenopathy.   Psychiatric/Behavioral:  Negative for agitation,

## 2025-01-28 ENCOUNTER — OFFICE VISIT (OUTPATIENT)
Dept: FAMILY MEDICINE CLINIC | Age: 35
End: 2025-01-28

## 2025-01-28 VITALS
TEMPERATURE: 98.6 F | DIASTOLIC BLOOD PRESSURE: 82 MMHG | SYSTOLIC BLOOD PRESSURE: 128 MMHG | OXYGEN SATURATION: 97 % | BODY MASS INDEX: 51.15 KG/M2 | HEART RATE: 92 BPM | WEIGHT: 293 LBS

## 2025-01-28 DIAGNOSIS — J06.9 UPPER RESPIRATORY TRACT INFECTION, UNSPECIFIED TYPE: Primary | ICD-10-CM

## 2025-01-28 LAB
INFLUENZA A ANTIGEN, POC: NEGATIVE
INFLUENZA B ANTIGEN, POC: NEGATIVE
LOT EXPIRE DATE: NORMAL
LOT KIT NUMBER: NORMAL
SARS-COV-2, POC: NORMAL
VALID INTERNAL CONTROL: NORMAL
VENDOR AND KIT NAME POC: NORMAL

## 2025-01-28 RX ORDER — AZITHROMYCIN 250 MG/1
TABLET, FILM COATED ORAL
Qty: 1 PACKET | Refills: 0 | Status: SHIPPED | OUTPATIENT
Start: 2025-01-28

## 2025-01-28 NOTE — PROGRESS NOTES
Elba Cortés   34 y.o.  female  [id number]      Chief Complaint   Patient presents with    Cold Symptoms     Cough(for 3 weeks) chest congestion nasal congestion sore throat achy-started Friday per pt          Subjective:  34 y.o.female is here for a follow up. She has the following chronic/acute medical problems:  Patient Active Problem List   Diagnosis    Primary thyroid follicular carcinoma (HCC)    Chronic cholecystitis    Status post laparoscopic cholecystectomy    PCOS (polycystic ovarian syndrome)    Migraines    Hypothyroidism    GERD (gastroesophageal reflux disease)    Depression    Chest pain    Palpitations    Dizziness    Elevated blood pressure reading    Elevated blood pressure reading in office without diagnosis of hypertension    TIGIST (obstructive sleep apnea)    Morbid obesity    Hypersomnia    Morbid obesity with BMI of 50.0-59.9, adult       HPI   History of Present Illness  The patient presents for evaluation of cold-like symptoms.    She was previously diagnosed with an upper respiratory infection in December 2024, which was accompanied by a persistent dry cough. The onset of her current symptoms was on Friday, characterized by a lack of appetite, chills, fatigue, cough, and congestion. She reports experiencing sinus pain and pressure, as well as postnasal drainage and rhinorrhea. She has not experienced any fever, sneezing, sore throat, shortness of breath, wheezing, chest tightness, nausea, or vomiting. However, she did report the onset of diarrhea yesterday, along with body aches and headaches. Her current medication regimen includes Mucinex, over-the-counter cold medications, and sinus medications.        Review of Systems See HPI    Current Outpatient Medications   Medication Sig Dispense Refill    VITAMIN D, CHOLECALCIFEROL, PO Take by mouth 10,000      fluticasone (FLONASE) 50 MCG/ACT nasal spray 2 sprays by Each Nostril route daily 16 g 0    Atogepant (QULIPTA) 60 MG TABS Take 60 mg by

## 2025-02-19 LAB
T4 FREE: 1.8 NG/DL (ref 0.8–1.8)
TSH ULTRASENSITIVE: 0.02 MCIU/ML (ref 0.4–4.5)

## 2025-02-24 ENCOUNTER — OFFICE VISIT (OUTPATIENT)
Age: 35
End: 2025-02-24
Payer: COMMERCIAL

## 2025-02-24 VITALS
OXYGEN SATURATION: 100 % | BODY MASS INDEX: 51.84 KG/M2 | DIASTOLIC BLOOD PRESSURE: 86 MMHG | WEIGHT: 293 LBS | SYSTOLIC BLOOD PRESSURE: 136 MMHG | HEART RATE: 75 BPM

## 2025-02-24 DIAGNOSIS — R51.9 CHRONIC DAILY HEADACHE: ICD-10-CM

## 2025-02-24 DIAGNOSIS — G43.009 MIGRAINE WITHOUT AURA, NOT INTRACTABLE, WITHOUT STATUS MIGRAINOSUS: Primary | ICD-10-CM

## 2025-02-24 PROCEDURE — 99213 OFFICE O/P EST LOW 20 MIN: CPT | Performed by: NURSE PRACTITIONER

## 2025-02-24 RX ORDER — CYCLOBENZAPRINE HCL 10 MG
10 TABLET ORAL 2 TIMES DAILY PRN
Qty: 30 TABLET | Refills: 1 | Status: SHIPPED | OUTPATIENT
Start: 2025-02-24

## 2025-02-24 RX ORDER — TOPIRAMATE 50 MG/1
50 TABLET, FILM COATED ORAL 2 TIMES DAILY
Qty: 180 TABLET | Refills: 3 | Status: SHIPPED | OUTPATIENT
Start: 2025-02-24

## 2025-02-24 RX ORDER — ATOGEPANT 60 MG/1
60 TABLET ORAL DAILY
Qty: 30 TABLET | Refills: 11 | Status: SHIPPED | OUTPATIENT
Start: 2025-02-24

## 2025-02-24 RX ORDER — RIMEGEPANT SULFATE 75 MG/75MG
75 TABLET, ORALLY DISINTEGRATING ORAL PRN
Qty: 8 TABLET | Refills: 2 | Status: SHIPPED | OUTPATIENT
Start: 2025-02-24

## 2025-02-24 NOTE — PROGRESS NOTES
2/24/25    Elba MENDIOLA Moo  1990    Chief Complaint   Patient presents with    Follow-up     6m follow up for Migraine without aura, not intractable, without status migrainosus       History of Present Illness  Elba is a 34 y.o. female presenting today for follow-up of: Migraine without aura, chronic daily headache.      Workup has included normal ophthalmology exam and normal MRI brain.  She also switched from combination birth control to Nexplanon.       She is compliant with her CPAP for TIGIST.      She is on Topamax 50 mg twice daily and Qulipta 60 mg daily for migraine prevention and Nurtec for abortive therapy.  She tells me her migraines are better controlled, she gets up 3 migraines per week but they are less intense, Nurtec helps knock them out. She had side effects to Ubrelvy.  She did ask me to refill her Flexeril that was prescribed by her previous provider for tension headaches. Tizanidine gave her a rash.    She rarely gets a breakthrough migraine, still gets tension headaches but not debilitating. Is satisfied with her current medications.     Migraine preventatives tried: Flexeril, Lexapro, tizanidine, Topamax  Abortive therapy tried: Nurtec, Imitrex, Ubrelvy     Current Outpatient Medications   Medication Sig Dispense Refill    Atogepant (QULIPTA) 60 MG TABS Take 60 mg by mouth daily 30 tablet 11    cyclobenzaprine (FLEXERIL) 10 MG tablet Take 1 tablet by mouth 2 times daily as needed for Muscle spasms 30 tablet 1    rimegepant sulfate (NURTEC) 75 MG TBDP Take 75 mg by mouth as needed (Migraine) Not to exceed more than 1 tab in 24 hours 8 tablet 2    topiramate (TOPAMAX) 50 MG tablet Take 1 tablet by mouth 2 times daily 180 tablet 3    VITAMIN D, CHOLECALCIFEROL, PO Take by mouth 10,000      escitalopram (LEXAPRO) 20 MG tablet       etonogestrel (NEXPLANON) 68 MG implant 68 mg by Subdermal route once      CPAP Machine MISC by Does not apply route      calcium carbonate (TUMS) 500 MG chewable

## 2025-02-25 ENCOUNTER — TELEPHONE (OUTPATIENT)
Age: 35
End: 2025-02-25

## 2025-02-25 NOTE — TELEPHONE ENCOUNTER
[facility]  Approved today by Marta CUEVAS 2017  CaseId:[id number];Status:Approved;Review Type:Prior Auth;Coverage Start Date:02/25/2025;Coverage End Date:02/25/2026;  Effective Date: 2/24/2025  Authorization Expiration Date: 2/24/2026

## 2025-02-25 NOTE — TELEPHONE ENCOUNTER
Outcome Qulipta  Approved today by Marta CUEVAS 2017  CaseId:[id number];Status:Approved;Review Type:Prior Auth;Coverage Start Date:02/25/2025;Coverage End Date:02/25/2026;  Effective Date: 2/24/2025  Authorization Expiration Date: 2/24/2026

## 2025-03-27 ENCOUNTER — TELEPHONE (OUTPATIENT)
Dept: CARDIOLOGY CLINIC | Age: 35
End: 2025-03-27

## 2025-03-27 NOTE — TELEPHONE ENCOUNTER
[facility] Calls-for established patients seen in last 3 years with no existing appointments.      MRN:[id number]      Established physician:Dr. Judith Almaraz         Verify Phone Number:[phone]              Reason for appointment:Irregular HR              Have you had any recent cardiac testing done outside of our office/physicians: no

## 2025-04-02 DIAGNOSIS — G43.009 MIGRAINE WITHOUT AURA, NOT INTRACTABLE, WITHOUT STATUS MIGRAINOSUS: ICD-10-CM

## 2025-04-02 DIAGNOSIS — R51.9 CHRONIC DAILY HEADACHE: ICD-10-CM

## 2025-04-02 RX ORDER — TOPIRAMATE 50 MG/1
50 TABLET, FILM COATED ORAL 2 TIMES DAILY
Qty: 180 TABLET | Refills: 3 | OUTPATIENT
Start: 2025-04-02

## 2025-04-09 ENCOUNTER — OFFICE VISIT (OUTPATIENT)
Dept: CARDIOLOGY CLINIC | Age: 35
End: 2025-04-09

## 2025-04-09 VITALS
SYSTOLIC BLOOD PRESSURE: 126 MMHG | DIASTOLIC BLOOD PRESSURE: 84 MMHG | WEIGHT: 293 LBS | HEART RATE: 75 BPM | BODY MASS INDEX: 50.02 KG/M2 | HEIGHT: 64 IN

## 2025-04-09 DIAGNOSIS — R00.2 PALPITATIONS: Primary | ICD-10-CM

## 2025-04-09 NOTE — PROGRESS NOTES
pulses, No edema, No tenderness, No cyanosis, No clubbing. Good range of motion in all major joints. No tenderness to palpation or major deformities noted. Back- No tenderness.   Integument:  Warm, Dry, No erythema, No rash.   Skin: no rash, no ulcers  Lymphatic:  No lymphadenopathy noted.   Neurologic:  Alert & oriented x 3, Normal motor function, Normal sensory function, No focal deficits noted.   Psychiatric:  Affect normal, Judgment normal, Mood normal.   Lab Review   No results for input(s): \"WBC\", \"HGB\", \"HCT\", \"PLT\" in the last 72 hours.   No results for input(s): \"NA\", \"K\", \"CL\", \"CO2\", \"PHOS\", \"BUN\", \"CREATININE\" in the last 72 hours.    Invalid input(s): \"CA\"  No results for input(s): \"AST\", \"ALT\", \"BILIDIR\", \"BILITOT\", \"ALKPHOS\" in the last 72 hours.    Invalid input(s): \"ALB\"  No results for input(s): \"TROPONINI\" in the last 72 hours.  No results found for: \"BNP\"  No results found for: \"INR\", \"PROTIME\"      EKG:nsr    Chest Xray:    ECHO:pending  Labs, echo, meds reviewed  Assessment: 34 y.o.year old with PMH of  has a past medical history of Biliary dyskinesia, Chest pain, Chronic foot pain, right, Depression, Dizziness, Elevated blood pressure reading in office without diagnosis of hypertension, Follicular thyroid cancer (HCC), GERD (gastroesophageal reflux disease), H/O echocardiogram, History of exercise stress test, Hypothyroidism, Migraines, Palpitations, PCOS (polycystic ovarian syndrome), Sleep apnea, Thyroid cancer (HCC), and Wears glasses.      Recommendations:    Palptiations: stress test, echo and 2 weeks monitor ordered  Sleep apnea: contieu CPAP  GERD\" continue PPI  OBESITY: recommend to lose weight  All labs, medications and tests reviewed, continue all other medications of all above medical condition listed as is.         Shannon Almaraz MD, 4/9/2025 2:29 PM

## 2025-04-11 ENCOUNTER — OFFICE VISIT (OUTPATIENT)
Dept: PULMONOLOGY | Age: 35
End: 2025-04-11
Payer: COMMERCIAL

## 2025-04-11 VITALS
HEIGHT: 64 IN | OXYGEN SATURATION: 97 % | WEIGHT: 293 LBS | BODY MASS INDEX: 50.02 KG/M2 | SYSTOLIC BLOOD PRESSURE: 138 MMHG | DIASTOLIC BLOOD PRESSURE: 78 MMHG | HEART RATE: 91 BPM | RESPIRATION RATE: 18 BRPM

## 2025-04-11 DIAGNOSIS — G47.33 OSA (OBSTRUCTIVE SLEEP APNEA): Primary | ICD-10-CM

## 2025-04-11 DIAGNOSIS — E66.01 MORBID OBESITY WITH BMI OF 50.0-59.9, ADULT: ICD-10-CM

## 2025-04-11 DIAGNOSIS — G47.10 HYPERSOMNIA: ICD-10-CM

## 2025-04-11 PROCEDURE — 99214 OFFICE O/P EST MOD 30 MIN: CPT | Performed by: INTERNAL MEDICINE

## 2025-04-11 ASSESSMENT — ENCOUNTER SYMPTOMS
EYE ITCHING: 0
BACK PAIN: 0
EYE DISCHARGE: 0
ABDOMINAL PAIN: 0
COUGH: 0
SHORTNESS OF BREATH: 0
ABDOMINAL DISTENTION: 0

## 2025-04-11 NOTE — PROGRESS NOTES
Elba MENDIOLA Moo  1990  Referring Provider: [facility] Beal, [facility]     Subjective:     Chief Complaint   Patient presents with    Follow-up     Pt is just here for her 1 year check up        HPI  Elba is a 34 y.o. female has come back as a follow up. She has mild TIGIST with EDS. She is on a Auto CPAP which she is using it every night about 6 to 7 hours. She says that it is helping her. She has 6 lb weight gain. She is less sleepy during the day time.She has a FFM.  Await download data     Current Outpatient Medications   Medication Sig Dispense Refill    Cyanocobalamin (VITAMIN B 12 PO) Take by mouth      Iron Combinations (IRON COMPLEX PO) Take by mouth      Atogepant (QULIPTA) 60 MG TABS Take 60 mg by mouth daily 30 tablet 11    cyclobenzaprine (FLEXERIL) 10 MG tablet Take 1 tablet by mouth 2 times daily as needed for Muscle spasms 30 tablet 1    rimegepant sulfate (NURTEC) 75 MG TBDP Take 75 mg by mouth as needed (Migraine) Not to exceed more than 1 tab in 24 hours 8 tablet 2    topiramate (TOPAMAX) 50 MG tablet Take 1 tablet by mouth 2 times daily 180 tablet 3    VITAMIN D, CHOLECALCIFEROL, PO Take by mouth 10,000      escitalopram (LEXAPRO) 20 MG tablet       etonogestrel (NEXPLANON) 68 MG implant 68 mg by Subdermal route once      CPAP Machine MISC by Does not apply route      calcium carbonate (TUMS) 500 MG chewable tablet Take 1 tablet by mouth daily as needed for Heartburn      levothyroxine (SYNTHROID) 200 MCG tablet Take 175 mcg by mouth Daily Only 6 days a week      azithromycin (ZITHROMAX) 250 MG tablet Take 2 tabs (500 mg) on Day 1, and take 1 tab (250 mg) on days 2 through 5. (Patient not taking: Reported on 4/11/2025) 1 packet 0    fluticasone (FLONASE) 50 MCG/ACT nasal spray 2 sprays by Each Nostril route daily (Patient not taking: Reported on 2/24/2025) 16 g 0    albuterol sulfate  (90 Base) MCG/ACT inhaler Inhale 1 puff into the lungs every 6 hours as needed (Patient

## 2025-04-20 ENCOUNTER — HOSPITAL ENCOUNTER (EMERGENCY)
Age: 35
Discharge: HOME | End: 2025-04-20
Payer: COMMERCIAL

## 2025-04-20 ENCOUNTER — APPOINTMENT (OUTPATIENT)
Dept: GENERAL RADIOLOGY | Age: 35
End: 2025-04-20
Payer: COMMERCIAL

## 2025-04-20 VITALS
BODY MASS INDEX: 50.02 KG/M2 | DIASTOLIC BLOOD PRESSURE: 78 MMHG | HEIGHT: 64 IN | RESPIRATION RATE: 17 BRPM | TEMPERATURE: 97.8 F | HEART RATE: 87 BPM | WEIGHT: 293 LBS | OXYGEN SATURATION: 98 % | SYSTOLIC BLOOD PRESSURE: 138 MMHG

## 2025-04-20 DIAGNOSIS — S50.01XA CONTUSION OF RIGHT ELBOW, INITIAL ENCOUNTER: Primary | ICD-10-CM

## 2025-04-20 DIAGNOSIS — R55 POSTURAL DIZZINESS WITH PRESYNCOPE: ICD-10-CM

## 2025-04-20 DIAGNOSIS — R42 POSTURAL DIZZINESS WITH PRESYNCOPE: ICD-10-CM

## 2025-04-20 PROCEDURE — 99284 EMERGENCY DEPT VISIT MOD MDM: CPT

## 2025-04-20 PROCEDURE — 93005 ELECTROCARDIOGRAM TRACING: CPT | Performed by: PHYSICIAN ASSISTANT

## 2025-04-20 PROCEDURE — 73080 X-RAY EXAM OF ELBOW: CPT

## 2025-04-20 ASSESSMENT — PAIN DESCRIPTION - ORIENTATION: ORIENTATION: RIGHT

## 2025-04-20 ASSESSMENT — PAIN DESCRIPTION - LOCATION: LOCATION: ARM

## 2025-04-20 ASSESSMENT — PAIN - FUNCTIONAL ASSESSMENT
PAIN_FUNCTIONAL_ASSESSMENT: ACTIVITIES ARE NOT PREVENTED
PAIN_FUNCTIONAL_ASSESSMENT: 0-10

## 2025-04-20 ASSESSMENT — PAIN SCALES - GENERAL: PAINLEVEL_OUTOF10: 4

## 2025-04-20 ASSESSMENT — PAIN DESCRIPTION - DESCRIPTORS: DESCRIPTORS: ACHING

## 2025-04-20 NOTE — ED PROVIDER NOTES
Triage Chief Complaint:   Arm Injury (Pt endorses right wrist/elbow pain after fall yesterday. Pt endorses having a near-syncopal episode, tripping over a stool and landing on her right forearm/elbow. Pt denies hitting head/denies thinners. Pt is currently wearing a Holter monitor d/t recent intermittent episodes of palpitations/dizziness.)    [place]:  Today in the ED I had the pleasure of caring for Elba Cortés who is a 34 y.o. female that presents today to the ED for evaluation for right-sided arm injury.  Patient has a history of near syncope, presyncope.  Currently being evaluated outpatient basis wearing a Holter monitor.  Denies any sensation of palpitations.  She had a syncopal episode today causing her to fall and land directly on her right elbow endorses right elbow pain 3/10 at rest.  7/10 when move sling.  She did take some Motrin.  600 mg prior to arrival.  Denies any sensation of palpitations no lightheadedness at this time no dizziness.     Context of syncope as patient was helping around the [employer].  She kept bending over and standing back up moving furniture from 1 [address] to the next for the [employer] festivities.  After bending over and standing    She got extremely lightheaded causing her to fall directly on the elbow.  She states she feels completely baseline down aside from elbow pain.    ROS:  REVIEW OF SYSTEMS    At least 10 systems reviewed      All other review of systems are negative  See HPI and nursing notes for additional information       Past Medical History:   Diagnosis Date    Biliary dyskinesia     Cholecystectomy    Chest pain 10/2020    Chronic foot pain, right     Depression     Takes Lexapro    Dizziness 10/2020    Elevated blood pressure reading in office without diagnosis of hypertension 10/16/2020    Follicular thyroid cancer (HCC) dx 2019    Surgery\"also tx with radioactive iodine\" follow with dr Schroeder    GERD (gastroesophageal reflux disease)     H/O echocardiogram 11/06/2020

## 2025-04-22 LAB
EKG ATRIAL RATE: 78 BPM
EKG DIAGNOSIS: NORMAL
EKG P AXIS: 47 DEGREES
EKG P-R INTERVAL: 152 MS
EKG Q-T INTERVAL: 382 MS
EKG QRS DURATION: 76 MS
EKG QTC CALCULATION (BAZETT): 435 MS
EKG R AXIS: 15 DEGREES
EKG T AXIS: 14 DEGREES
EKG VENTRICULAR RATE: 78 BPM

## 2025-04-22 PROCEDURE — 93010 ELECTROCARDIOGRAM REPORT: CPT | Performed by: INTERNAL MEDICINE

## 2025-05-05 NOTE — TELEPHONE ENCOUNTER
Last Visit: 2/24/25    No follow up on file.     Rx is pending.    Requested Prescriptions     Pending Prescriptions Disp Refills    cyclobenzaprine (FLEXERIL) 10 MG tablet [Pharmacy Med Name: CYCLOBENZAPRINE 10 MG TABLET] 60 tablet      Sig: TAKE 1 TABLET BY MOUTH TWICE A DAY AS NEEDED FOR MUSCLE SPASMS

## 2025-05-06 RX ORDER — CYCLOBENZAPRINE HCL 10 MG
TABLET ORAL
Qty: 60 TABLET | Refills: 5 | Status: SHIPPED | OUTPATIENT
Start: 2025-05-06

## 2025-05-07 ENCOUNTER — OFFICE VISIT (OUTPATIENT)
Dept: CARDIOLOGY CLINIC | Age: 35
End: 2025-05-07
Payer: COMMERCIAL

## 2025-05-07 VITALS
WEIGHT: 293 LBS | HEART RATE: 92 BPM | SYSTOLIC BLOOD PRESSURE: 122 MMHG | DIASTOLIC BLOOD PRESSURE: 86 MMHG | BODY MASS INDEX: 50.02 KG/M2 | HEIGHT: 64 IN

## 2025-05-07 DIAGNOSIS — E66.9 OBESITY (BMI 35.0-39.9 WITHOUT COMORBIDITY): ICD-10-CM

## 2025-05-07 DIAGNOSIS — R00.2 PALPITATIONS: Primary | ICD-10-CM

## 2025-05-07 DIAGNOSIS — E03.2 HYPOTHYROIDISM DUE TO NON-MEDICATION EXOGENOUS SUBSTANCES: ICD-10-CM

## 2025-05-07 DIAGNOSIS — G47.39 OTHER SLEEP APNEA: ICD-10-CM

## 2025-05-07 PROCEDURE — 99214 OFFICE O/P EST MOD 30 MIN: CPT | Performed by: INTERNAL MEDICINE

## 2025-05-07 NOTE — PROGRESS NOTES
Judith Almaraz MD        OFFICE  FOLLOWUP NOTE    Chief complaints: patient is here for management of palpitations, hypothyroidism, obesity, GERD    Subjective: patient feels better, no chest pain, no shortness of breath, no dizziness, no palpitations    SPENCER Arreola is a 34 y.o.year old who  has a past medical history of Biliary dyskinesia, Chest pain, Chronic foot pain, right, Depression, Dizziness, Elevated blood pressure reading in office without diagnosis of hypertension, Follicular thyroid cancer (HCC), GERD (gastroesophageal reflux disease), H/O echocardiogram, History of exercise stress test, Hypothyroidism, Migraines, Palpitations, PCOS (polycystic ovarian syndrome), Sleep apnea, Thyroid cancer (HCC), and Wears glasses. and presents for management of  palpitations, hypothyroidism, obesity, GERDwhich are well controlled      Current Outpatient Medications   Medication Sig Dispense Refill    cyclobenzaprine (FLEXERIL) 10 MG tablet TAKE 1 TABLET BY MOUTH TWICE A DAY AS NEEDED FOR MUSCLE SPASMS 60 tablet 5    Cyanocobalamin (VITAMIN B 12 PO) Take by mouth      Iron Combinations (IRON COMPLEX PO) Take by mouth      Atogepant (QULIPTA) 60 MG TABS Take 60 mg by mouth daily 30 tablet 11    rimegepant sulfate (NURTEC) 75 MG TBDP Take 75 mg by mouth as needed (Migraine) Not to exceed more than 1 tab in 24 hours 8 tablet 2    topiramate (TOPAMAX) 50 MG tablet Take 1 tablet by mouth 2 times daily 180 tablet 3    azithromycin (ZITHROMAX) 250 MG tablet Take 2 tabs (500 mg) on Day 1, and take 1 tab (250 mg) on days 2 through 5. (Patient not taking: Reported on 4/11/2025) 1 packet 0    VITAMIN D, CHOLECALCIFEROL, PO Take by mouth 10,000      fluticasone (FLONASE) 50 MCG/ACT nasal spray 2 sprays by Each Nostril route daily (Patient not taking: Reported on 2/24/2025) 16 g 0    escitalopram (LEXAPRO) 20 MG tablet       etonogestrel (NEXPLANON) 68 MG implant 68 mg by Subdermal route once      albuterol sulfate

## 2025-05-08 ENCOUNTER — TELEPHONE (OUTPATIENT)
Dept: CARDIOLOGY CLINIC | Age: 35
End: 2025-05-08

## 2025-05-08 NOTE — TELEPHONE ENCOUNTER
This is cardiac monitor report, basic rhtyhm is sinus, min hr is 47 bpm max hr is150 bpm, no afib, no heart block, no VTACH , few pvcs and APCs noted   All questions answered and voiced understanding, pt seen in office yesterday with Sung.

## 2025-05-21 LAB
BUN / CREAT RATIO: 9 (ref 7–25)
BUN BLDV-MCNC: 8 MG/DL (ref 3–29)
CALCIUM SERPL-MCNC: 9.1 MG/DL (ref 8.5–10.5)
CHLORIDE BLD-SCNC: 106 MEQ/L (ref 96–110)
CO2: 23 MEQ/L (ref 19–32)
CREAT SERPL-MCNC: 0.9 MG/DL (ref 0.5–1.2)
ESTIMATED GLOMERULAR FILTRATION RATE CREATININE EQUATION: 86 MLS/MIN/1.73M2
FASTING STATUS: NORMAL
GLUCOSE BLD-MCNC: 99 MG/DL (ref 70–99)
POTASSIUM SERPL-SCNC: 4.2 MEQ/L (ref 3.4–5.3)
SODIUM BLD-SCNC: 139 MEQ/L (ref 135–148)
T4 FREE: 1.39 NG/DL (ref 0.8–1.8)
TSH ULTRASENSITIVE: 0.84 MCIU/ML (ref 0.4–4.5)

## 2025-05-24 LAB
THYROGLOBULIN AB: <1 IU/ML
THYROGLOBULIN: 0.1 NG/ML (ref 2.8–40.9)

## 2025-08-18 LAB
BUN / CREAT RATIO: 8 (ref 7–25)
BUN BLDV-MCNC: 7 MG/DL (ref 3–29)
CALCIUM SERPL-MCNC: 8.9 MG/DL (ref 8.5–10.5)
CHLORIDE BLD-SCNC: 108 MEQ/L (ref 96–110)
CO2: 22 MEQ/L (ref 19–32)
CREAT SERPL-MCNC: 0.9 MG/DL (ref 0.5–1.2)
ESTIMATED GLOMERULAR FILTRATION RATE CREATININE EQUATION: 86 MLS/MIN/1.73M2
FASTING STATUS: ABNORMAL
GLUCOSE BLD-MCNC: 100 MG/DL (ref 70–99)
POTASSIUM SERPL-SCNC: 3.7 MEQ/L (ref 3.4–5.3)
SODIUM BLD-SCNC: 139 MEQ/L (ref 135–148)
T4 FREE: 1.52 NG/DL (ref 0.8–1.8)
TSH ULTRASENSITIVE: 1.7 MCIU/ML (ref 0.4–4.5)
VITAMIN D 25-HYDROXY: 50 NG/ML (ref 30–100)

## 2025-08-23 DIAGNOSIS — R51.9 CHRONIC DAILY HEADACHE: ICD-10-CM

## 2025-08-23 DIAGNOSIS — G43.009 MIGRAINE WITHOUT AURA, NOT INTRACTABLE, WITHOUT STATUS MIGRAINOSUS: ICD-10-CM

## 2025-08-26 RX ORDER — RIMEGEPANT SULFATE 75 MG/75MG
TABLET, ORALLY DISINTEGRATING ORAL
Qty: 8 TABLET | Refills: 2 | Status: ACTIVE | OUTPATIENT
Start: 2025-08-26

## (undated) DEVICE — SUTURE VCRL SZ 4-0 L18IN ABSRB UD RB-1 L17MM 1/2 CIR J714D

## (undated) DEVICE — APPLIER CLP M/L SHFT DIA5MM 15 LIG LIGAMAX 5

## (undated) DEVICE — STAT BAG WITH DISPENSER PACK 6X9

## (undated) DEVICE — GOWN,SIRUS,FABRNF,RAGLAN,L,ST,30/CS: Brand: MEDLINE

## (undated) DEVICE — SYRINGE IRRIG 60ML SFT PLIABLE BLB EZ TO GRP 1 HND USE W/

## (undated) DEVICE — MARKER SURG SKIN UTIL REGULAR/FINE 2 TIP W/ RUL AND 9 LBL

## (undated) DEVICE — SHEARS ENDOSCP L9CM CRV HARM FOCS +

## (undated) DEVICE — SOLUTION IV 1000ML 0.9% SOD CHL FOR IRRIG PLAS CONT

## (undated) DEVICE — TROCAR: Brand: KII FIOS FIRST ENTRY

## (undated) DEVICE — LINER,SEMI-RIGID,3000CC,50EA/CS: Brand: MEDLINE

## (undated) DEVICE — INTENDED FOR TISSUE SEPARATION, AND OTHER PROCEDURES THAT REQUIRE A SHARP SURGICAL BLADE TO PUNCTURE OR CUT.: Brand: BARD-PARKER ® STAINLESS STEEL BLADES

## (undated) DEVICE — SOLUTION IV IRRIG WATER 1000ML POUR BRL 2F7114

## (undated) DEVICE — AGENT HEMSTAT W2XL4IN OXIDIZED REGENERATED CELOS ABSRB SFT

## (undated) DEVICE — SOLUTION IV IRRIG POUR BRL 0.9% SODIUM CHL 2F7124

## (undated) DEVICE — GOWN,SIRUS,POLYRNF,BRTHSLV,XLN/XL,20/CS: Brand: MEDLINE

## (undated) DEVICE — SUTURE PROL SZ 2-0 L36IN NONABSORBABLE BLU SH L26MM 1/2 CIR 8523H

## (undated) DEVICE — SPONGE GZ W4XL8IN COT WVN 12 PLY

## (undated) DEVICE — COUNTER NDL 30 COUNT FOAM STRP SGL MAG

## (undated) DEVICE — SUTURE NONABSORBABLE MONOFILAMENT 4-0 P-3 18 IN ETHILON 699H

## (undated) DEVICE — TOWEL,OR,DSP,ST,BLUE,STD,6/PK,12PK/CS: Brand: MEDLINE

## (undated) DEVICE — ANESTHESIA CIRCUIT ADULT-LF: Brand: MEDLINE INDUSTRIES, INC.

## (undated) DEVICE — GLOVE ORANGE PI 7   MSG9070

## (undated) DEVICE — SPONGE 6.75X6IN ABS WVN LINT FR CTTN STRL

## (undated) DEVICE — PENCIL,CAUTERY,ROCKER,PTFE,15'CORD: Brand: MEDLINE INDUSTRIES, INC.

## (undated) DEVICE — Device

## (undated) DEVICE — SUTURE PROL SZ 2-0 L30IN NONABSORBABLE BLU L26MM SH 1/2 CIR 8833H

## (undated) DEVICE — ELECTRODE ES L2.75IN S STL INSUL BLDE W/ SL EDGE

## (undated) DEVICE — CHLORAPREP 26ML ORANGE

## (undated) DEVICE — DRESSING PETRO W3XL3IN OIL EMUL N ADH GZ KNIT IMPREG CELOS

## (undated) DEVICE — ELECTRODE ES AD CRDLSS PT RET REM POLYHESIVE

## (undated) DEVICE — STERILE COTTON BALLS LARGE 5/P: Brand: MEDLINE

## (undated) DEVICE — BANDAGE,ELASTIC,ESMARK,STERILE,4"X9',LF: Brand: MEDLINE

## (undated) DEVICE — SUTURE VCRL SZ 3-0 L27IN ABSRB UD L26MM SH 1/2 CIR J416H

## (undated) DEVICE — TROCARS: Brand: KII® BALLOON BLUNT TIP SYSTEM

## (undated) DEVICE — 34" SINGLE PATIENT USE HOVERMATT BREATHABLE: Brand: SINGLE PATIENT USE HOVERMATT

## (undated) DEVICE — TOTAL TRAY, DB, 100% SILI FOLEY, 16FR 10: Brand: MEDLINE

## (undated) DEVICE — GAUZE,SPONGE,4"X4",16PLY,XRAY,STRL,LF: Brand: MEDLINE

## (undated) DEVICE — SUTURE COAT VCRL SZ 4-0 L18IN ABSRB UD L19MM PS-2 1/2 CIR J496G

## (undated) DEVICE — SHEET,T,THYROID,STERILE: Brand: MEDLINE

## (undated) DEVICE — TROCAR: Brand: KII® SLEEVE

## (undated) DEVICE — GLOVE SURG SZ 65 CRM LTX FREE POLYISOPRENE POLYMER BEAD ANTI

## (undated) DEVICE — GLOVE SURG SZ 65 THK91MIL LTX FREE SYN POLYISOPRENE

## (undated) DEVICE — TUBING, SUCTION, 3/16" X 10', STRAIGHT: Brand: MEDLINE

## (undated) DEVICE — ELECTRODE ELECSURG NDL 2.8 INX7.2 CM COAT INSUL EDGE

## (undated) DEVICE — AGENT HEMSTAT W4XL4IN OXIDIZED REGENERATED CELOS STRUCTURED

## (undated) DEVICE — BANDAGE,GAUZE,BULKEE II,4.5"X4.1YD,STRL: Brand: MEDLINE

## (undated) DEVICE — SUTURE VCRL SZ 3-0 L18IN ABSRB UD W/O NDL POLYGLACTIN 910 J110T

## (undated) DEVICE — LINER SUCT CANSTR 1500CC SEMI RIG W/ POR HYDROPHOBIC SHUT

## (undated) DEVICE — SUTURE VCRL SZ 3-0 L27IN ABSRB UD L17MM RB-1 1/2 CIR J215H

## (undated) DEVICE — SPONGE,PEANUT,XRAY,ST,SM,3/8",5/CARD: Brand: MEDLINE INDUSTRIES, INC.

## (undated) DEVICE — APPLICATOR MEDICATED 26 CC SOLUTION HI LT ORNG CHLORAPREP

## (undated) DEVICE — SUTURE VCRL SZ 4-0 L18IN ABSRB UD L13MM P-3 3/8 CIR PRIM J494H

## (undated) DEVICE — PENCIL ES CRD L10FT HND SWCHING ROCK SWCH W/ EDGE COAT BLDE

## (undated) DEVICE — ELECTRODE NDL L2.8IN COAT LO PWR SET EDGE

## (undated) DEVICE — ELECTRODE,RADIOTRANSLUCENT,FOAM,5PK: Brand: MEDLINE

## (undated) DEVICE — Device: Brand: JELCO

## (undated) DEVICE — GLOVE ORANGE PI 7 1/2   MSG9075

## (undated) DEVICE — DRAIN SURG 15FR SIL SMOOTH RND 1/8IN END PERF W/ TRCR RADPQ

## (undated) DEVICE — SUTURE VCRL SZ 3-0 L18IN ABSRB UD L26MM SH 1/2 CIR J864D

## (undated) DEVICE — YANKAUER,FLEXIBLE HANDLE,REGLR CAPACITY: Brand: MEDLINE INDUSTRIES, INC.

## (undated) DEVICE — PACK,BASIC,IX: Brand: MEDLINE

## (undated) DEVICE — 3M™ IOBAN™ 2 ANTIMICROBIAL INCISE DRAPE 6648EZ: Brand: IOBAN™ 2

## (undated) DEVICE — TUBING INSUFFLATOR HEAT HI FLO SET PNEUMOCLEAR

## (undated) DEVICE — DISCONTINUED USE 111569 BF APPLICATOR COTN TIP 6IN ST

## (undated) DEVICE — CUFF REPROCESS BP TOURN SING BLDR 2 PORT 4X18IN

## (undated) DEVICE — SKIN AFFIX SURG ADHESIVE 72/CS 0.55ML: Brand: MEDLINE

## (undated) DEVICE — ADHESIVE SKIN CLSR 0.7ML TOP DERMBND ADV

## (undated) DEVICE — SUTURE VCRL 2-0 TIE 54IN ABSRB BRAID UD J607H

## (undated) DEVICE — SYRINGE MED 10ML LUERLOCK TIP W/O SFTY DISP

## (undated) DEVICE — GLOVE SURG SZ 7 L12IN FNGR THK87MIL WHT LTX FREE

## (undated) DEVICE — CONMED ACCESSORY ELECTRODE, NEEDLE WITH EXTENDED INSULATION: Brand: CONMED

## (undated) DEVICE — TUBING FLTR PLUME AWAY EVAC W/ SUCT DEV DISP PUREVIEW

## (undated) DEVICE — GLOVE SURG SZ 6 THK91MIL LTX FREE SYN POLYISOPRENE ANTI

## (undated) DEVICE — BAG SPEC REM 224ML W4XL6IN DIA10MM 1 HND GYN DISP ENDOPCH

## (undated) DEVICE — DRAPE,EXTREMITY,89X128,STERILE: Brand: MEDLINE

## (undated) DEVICE — Z INACTIVE USE 2735373 APPLICATOR FBR LAIN COT WOOD TIP ECONOMICAL

## (undated) DEVICE — GLOVE SURG SZ 65 L12IN FNGR THK87MIL WHT LTX FREE

## (undated) DEVICE — 20 ML SYRINGE LUER-LOCK TIP: Brand: MONOJECT

## (undated) DEVICE — CONTAINER,SPECIMEN,OR STERILE,4OZ: Brand: MEDLINE

## (undated) DEVICE — TUBING, SUCTION, 9/32" X 10', STRAIGHT: Brand: MEDLINE

## (undated) DEVICE — SUTURE VCRL SZ 4-0 L18IN ABSRB UD L19MM PS-2 3/8 CIR PRIM J496H

## (undated) DEVICE — NEEDLE HYPO 25GA L1.5IN BLU POLYPR HUB S STL REG BVL STR

## (undated) DEVICE — BANDAGE ACE ELASTIC ECON  4.0INX4.5YD

## (undated) DEVICE — CORD,CAUTERY,BIPOLAR,STERILE: Brand: MEDLINE

## (undated) DEVICE — STERILE LATEX POWDER-FREE SURGICAL GLOVESWITH NITRILE COATING: Brand: PROTEXIS

## (undated) DEVICE — CORD ES L15FT PT RET REUSE VALLEYLAB REM

## (undated) DEVICE — PROTECTOR EYE PT SELF ADH NS OPT GRD LF

## (undated) DEVICE — LAPAROSCOPIC TROCAR SLEEVE/SINGLE USE: Brand: KII® OPTICAL ACCESS SYSTEM

## (undated) DEVICE — GOWN,ECLIPSE,POLYRNF,BRTHSLV,L,30/CS: Brand: MEDLINE